# Patient Record
Sex: FEMALE | Race: WHITE | Employment: OTHER | URBAN - METROPOLITAN AREA
[De-identification: names, ages, dates, MRNs, and addresses within clinical notes are randomized per-mention and may not be internally consistent; named-entity substitution may affect disease eponyms.]

---

## 2017-05-24 ENCOUNTER — TELEPHONE (OUTPATIENT)
Dept: RESPIRATORY THERAPY | Age: 77
End: 2017-05-24

## 2017-06-22 ENCOUNTER — TELEPHONE (OUTPATIENT)
Dept: RESPIRATORY THERAPY | Age: 77
End: 2017-06-22

## 2017-07-18 ENCOUNTER — TELEPHONE (OUTPATIENT)
Dept: RESPIRATORY THERAPY | Age: 77
End: 2017-07-18

## 2017-08-04 ENCOUNTER — HOSPITAL ENCOUNTER (OUTPATIENT)
Dept: VASCULAR SURGERY | Age: 77
Discharge: HOME | End: 2017-08-04
Attending: FAMILY MEDICINE
Payer: MEDICARE

## 2017-08-04 DIAGNOSIS — R09.89 RIGHT CAROTID BRUIT: ICD-10-CM

## 2017-08-04 PROCEDURE — 93880 EXTRACRANIAL BILAT STUDY: CPT

## 2017-08-04 NOTE — PROCEDURES
[address]  *** FINAL REPORT ***    Name: Carlee Coles  MRN: [id number]    Outpatient  : 14 Oct 1940  HIS Order #: [id number]  [address] [facility] Visit #: [id number]  Date: 04 Aug 2017    TYPE OF TEST: Cerebrovascular Duplex    REASON FOR TEST  Carotid bruit (right hemisphere)    Right Carotid:-             Proximal               Mid                 Distal  cm/s  Systolic  Diastolic  Systolic  Diastolic  Systolic  Diastolic  CCA:     62.4      17.0                            65.0      18.0  Bulb:  ECA:     86.0  ICA:     69.0      19.0                            81.0      21.0  ICA/CCA:  1.1       1.1    ICA Stenosis:    Right Vertebral:-  Finding: Antegrade  Sys:       59.0  Jana:    Right Subclavian:    Left Carotid:-            Proximal                Mid                 Distal  cm/s  Systolic  Diastolic  Systolic  Diastolic  Systolic  Diastolic  CCA:     88.5      17.0                            73.0      17.0  Bulb:  ECA:    116.0  ICA:     95.0      13.0                            82.0      26.0  ICA/CCA:  1.3       0.8    ICA Stenosis:    Left Vertebral:-  Finding: Antegrade  Sys:       69.0  Jana:    Left Subclavian:    INTERPRETATION/FINDINGS  PROCEDURE:  Color duplex ultrasound imaging of extracranial  cerebrovascular arteries. FINDINGS:       Right:  Internal carotid velocity is within normal limits. There   is narrowing of the internal carotid flow channel on color Doppler  imaging and calcific plaque on B-mode imaging, consistent with less  than 50 percent stenosis. The common and external carotid arteries  are patent and without evidence of hemodynamically significant  stenosis. Left:   Internal carotid velocity is within normal limits. There   is narrowing of the internal carotid flow channel on color Doppler  imaging and calcific plaque on B-mode imaging, consistent with less  than 50 percent stenosis.   The common and external carotid arteries  are patent and without evidence of hemodynamically significant  stenosis. The internal carotid is tortuous distally and appears to be   looped. IMPRESSION:  Consistent with less than 50% stenosis of the internal  carotids. Vertebrals are patent with antegrade flow. ADDITIONAL COMMENTS    I have personally reviewed the data relevant to the interpretation of  this  study.     TECHNOLOGIST: Kelsi Patino RVT  Signed: 08/04/2017 02:12 PM    PHYSICIAN: Verner Ledger, MD  Signed: 08/07/2017 07:21 AM

## 2017-08-17 ENCOUNTER — TELEPHONE (OUTPATIENT)
Dept: RESPIRATORY THERAPY | Age: 77
End: 2017-08-17

## 2017-09-11 ENCOUNTER — TELEPHONE (OUTPATIENT)
Dept: RESPIRATORY THERAPY | Age: 77
End: 2017-09-11

## 2017-10-19 ENCOUNTER — TELEPHONE (OUTPATIENT)
Dept: RESPIRATORY THERAPY | Age: 77
End: 2017-10-19

## 2018-02-13 ENCOUNTER — DOCUMENTATION ONLY (OUTPATIENT)
Dept: SLEEP MEDICINE | Age: 78
End: 2018-02-13

## 2018-02-13 NOTE — PROGRESS NOTES
Patient called and having issues with PAP humidifier. Splashing water on her face. Directed her to call [address] YULIYA KITCHEN for assistance. Went ahead and scheduled her follow-up on 5/25/2018 11:20am with Dr. Gayla Decker.

## 2018-02-14 ENCOUNTER — TELEPHONE (OUTPATIENT)
Dept: SLEEP MEDICINE | Age: 78
End: 2018-02-14

## 2018-02-14 DIAGNOSIS — Z99.81 ON SUPPLEMENTAL OXYGEN THERAPY: ICD-10-CM

## 2018-02-14 DIAGNOSIS — J44.9 CHRONIC OBSTRUCTIVE PULMONARY DISEASE, UNSPECIFIED COPD TYPE (HCC): ICD-10-CM

## 2018-02-14 DIAGNOSIS — G47.33 OSA TREATED WITH BIPAP: Primary | ICD-10-CM

## 2018-02-14 NOTE — TELEPHONE ENCOUNTER
A new device has been prescribed - she will need a sleep to confirm settings given diagnosis of ESAU treated with Bi-Level therapy + COPD + O2 therapy. Encounter Diagnoses   Name Primary?  ESAU treated with BiPAP Yes    Chronic obstructive pulmonary disease, unspecified COPD type ([facility] [address]     On supplemental oxygen therapy      Orders Placed This Encounter    AMB SUPPLY ORDER     Diagnosis: Sleep Apnea ICD-10 Code (G47.30); ICD-9 Code (780.57). Positive Airway Pressure Therapy: Duration of need: 99 months. ResMed VPAP S (Spontaneous Mode):  IPAP: 12 cmH2O; Minimum EPAP: 08 cmH2O + 2 LPM supplemental oxygen therapy. Ramp Time: 30 Minutes; Easy Breathe: On.    CPAP mask -  As fitted during titration OR patient preference, headgear, tubing, and filter;  heated humidifier; wireless modem. Remote monitoring enrollment. Birdie Noriega MD, FAASM; NPI: [id number]  Electronically signed.  02/14/18    SLEEP LAB (PAP TITRATION)     Standing Status:   Future     Standing Expiration Date:   8/15/2018     Scheduling Instructions:      Perform Bi-Level titration on Supplemental Oxygen 2 LPM.     Order Specific Question:   Reason for Exam     Answer:   ESAU

## 2018-02-14 NOTE — TELEPHONE ENCOUNTER
Patient called stating she needs a new machine per DME company but she needs a new order. She already has an appointment scheduled in May for follow up but requests a order for new machine in the mean time. Please advise. Her current machine is \"spitting water everywhere\" and she is unable to use machine.

## 2018-02-22 ENCOUNTER — HOSPITAL ENCOUNTER (OUTPATIENT)
Dept: SLEEP MEDICINE | Age: 78
Discharge: HOME | End: 2018-02-22
Payer: MEDICARE

## 2018-02-22 VITALS
SYSTOLIC BLOOD PRESSURE: 136 MMHG | WEIGHT: 199.6 LBS | DIASTOLIC BLOOD PRESSURE: 83 MMHG | HEIGHT: 63 IN | BODY MASS INDEX: 35.37 KG/M2 | OXYGEN SATURATION: 94 % | HEART RATE: 103 BPM

## 2018-02-22 DIAGNOSIS — G47.33 OSA TREATED WITH BIPAP: ICD-10-CM

## 2018-02-22 DIAGNOSIS — Z99.81 ON SUPPLEMENTAL OXYGEN THERAPY: ICD-10-CM

## 2018-02-22 PROCEDURE — 95811 POLYSOM 6/>YRS CPAP 4/> PARM: CPT | Performed by: INTERNAL MEDICINE

## 2018-02-23 ENCOUNTER — TELEPHONE (OUTPATIENT)
Dept: SLEEP MEDICINE | Age: 78
End: 2018-02-23

## 2018-02-23 DIAGNOSIS — G47.33 OSA (OBSTRUCTIVE SLEEP APNEA): Primary | ICD-10-CM

## 2018-02-27 NOTE — TELEPHONE ENCOUNTER
Orders Placed This Encounter    AMB SUPPLY ORDER     Diagnosis: (G47.33) ESAU (obstructive sleep apnea)  (primary encounter diagnosis)     Positive Airway Pressure Therapy: Duration of need: 99 months. Respironics DreamStation ( Bi-Level Unit / Spontaneous Mode):    IPAP: 10 cmH2O; Minimum EPAP: 06 cmH2O. Ramp Time: 30 Minutes; Flex: 2. Remote monitoring enrollment.  Heated Humidifier     Oral/Nasal Combo Mask 1 every 3 months.  Oral Cushion Combo Mask (Replace) 2 per month.  Nasal Pillows Combo Mask (Replace) 2 per month.  Full Face Mask 1 every 3 months.  Full Face Mask Cushion 1 per month.  Nasal Cushion (Replace) 2 per month.  Nasal Pillows (Replace) 2 per month.  Nasal Interface Mask 1 every 3 months.  Headgear 1 every 6 months.  Chinstrap 1 every 6 months.  Tubing 1 every 3 months.  Filter(s) Disposable 2 per month.  Filter(s) Non-Disposable 1 every 6 months.  Oral Interface 1 every 3 months. [address] for [employer] (Replace) 1 every 6 months.  Tubing with heating element 1 every 3 months. Perform Mask Fitting per patient preference and comfort - replace as above. Ashvin Guerrier MD, FAASM; NPI: [id number]  Electronically signed.  02/27/18

## 2018-02-28 ENCOUNTER — DOCUMENTATION ONLY (OUTPATIENT)
Dept: SLEEP MEDICINE | Age: 78
End: 2018-02-28

## 2018-03-02 ENCOUNTER — OFFICE VISIT (OUTPATIENT)
Dept: SLEEP MEDICINE | Age: 78
End: 2018-03-02

## 2018-03-02 VITALS
HEIGHT: 63 IN | OXYGEN SATURATION: 96 % | HEART RATE: 89 BPM | BODY MASS INDEX: 35.26 KG/M2 | SYSTOLIC BLOOD PRESSURE: 157 MMHG | WEIGHT: 199 LBS | DIASTOLIC BLOOD PRESSURE: 85 MMHG

## 2018-03-02 DIAGNOSIS — Z86.59 H/O: DEPRESSION: ICD-10-CM

## 2018-03-02 DIAGNOSIS — J44.9 CHRONIC OBSTRUCTIVE PULMONARY DISEASE, UNSPECIFIED COPD TYPE (HCC): ICD-10-CM

## 2018-03-02 DIAGNOSIS — Z99.81 ON SUPPLEMENTAL OXYGEN THERAPY: ICD-10-CM

## 2018-03-02 DIAGNOSIS — I10 ESSENTIAL HYPERTENSION: ICD-10-CM

## 2018-03-02 DIAGNOSIS — G47.33 OSA (OBSTRUCTIVE SLEEP APNEA): Primary | ICD-10-CM

## 2018-03-02 NOTE — PATIENT INSTRUCTIONS
[address] [facility] [place]. [place], [address]  Tel.  [phone]  Fax. [address] [facility] [address]  [place], [address] [facility]  Tel.  [phone]  Fax. [phone] [address] Billy Chung  Tel.  [phone]  Fax. [phone]     Learning About CPAP for Sleep Apnea  What is CPAP? CPAP is a small machine that you use at home every night while you sleep. It increases air pressure in your throat to keep your airway open. When you have sleep apnea, this can help you sleep better so you feel much better. CPAP stands for \"continuous positive airway pressure. \"  The CPAP machine will have one of the following:  · A mask that covers your nose and mouth  · Prongs that fit into your nose  · A mask that covers your nose only, the most common type. This type is called NCPAP. The N stands for \"nasal.\"  Why is it done? CPAP is usually the best treatment for obstructive sleep apnea. It is the first treatment choice and the most widely used. Your doctor may suggest CPAP if you have:  · Moderate to severe sleep apnea. · Sleep apnea and coronary artery disease (CAD) or heart failure. How does it help? · CPAP can help you have more normal sleep, so you feel less sleepy and more alert during the daytime. · CPAP may help keep heart failure or other heart problems from getting worse. · NCPAP may help lower your blood pressure. · If you use CPAP, your bed partner may also sleep better because you are not snoring or restless. What are the side effects? Some people who use CPAP have:  · A dry or stuffy nose and a sore throat. · Irritated skin on the face. · Sore eyes. · Bloating. If you have any of these problems, work with your doctor to fix them. Here are some things you can try:  · Be sure the mask or nasal prongs fit well. · See if your doctor can adjust the pressure of your CPAP. · If your nose is dry, try a humidifier.   · If your nose is runny or stuffy, try decongestant medicine or a steroid nasal spray. If these things do not help, you might try a different type of machine. Some machines have air pressure that adjusts on its own. Others have air pressures that are different when you breathe in than when you breathe out. This may reduce discomfort caused by too much pressure in your nose. Where can you learn more? Go to "[employer]be  Enter Manuel Avelar in the search box to learn more about \"Learning About CPAP for Sleep Apnea. \"   © 3456-7725 Healthwise, Incorporated. Care instructions adapted under license by [facility] [employer] (which disclaims liability or warranty for this information). This care instruction is for use with your licensed healthcare professional. If you have questions about a medical condition or this instruction, always ask your healthcare professional. [address] any warranty or liability for your use of this information. Content Version: 4.8.37891; Last Revised: January 11, 2010  PROPER SLEEP HYGIENE    What to avoid  · Do not have drinks with caffeine, such as coffee or black tea, for 8 hours before bed. · Do not smoke or use other types of tobacco near bedtime. Nicotine is a stimulant and can keep you awake. · Avoid drinking alcohol late in the evening, because it can cause you to wake in the middle of the night. · Do not eat a big meal close to bedtime. If you are hungry, eat a light snack. · Do not drink a lot of water close to bedtime, because the need to urinate may wake you up during the night. · Do not read or watch TV in bed. Use the bed only for sleeping and sexual activity. What to try  · Go to bed at the same time every night, and wake up at the same time every morning. Do not take naps during the day. · Keep your bedroom quiet, dark, and cool. · Get regular exercise, but not within 3 to 4 hours of your bedtime. .  · Sleep on a comfortable pillow and mattress.   · If watching the clock makes you anxious, turn it facing away from you so you cannot see the time. · If you worry when you lie down, start a worry book. Well before bedtime, write down your worries, and then set the book and your concerns aside. · Try meditation or other relaxation techniques before you go to bed. · If you cannot fall asleep, get up and go to another room until you feel sleepy. Do something relaxing. Repeat your bedtime routine before you go to bed again. · Make your house quiet and calm about an hour before bedtime. Turn down the lights, turn off the TV, log off the computer, and turn down the volume on music. This can help you relax after a busy day. Drowsy Driving: The [employer] cites drowsiness as a causing factor in more than 941,279 police reported crashes annually, resulting in 76,000 injuries and 1,500 deaths. Other surveys suggest 55% of people polled have driven while drowsy in the past year, 23% had fallen asleep but not crashed, 3% crashed, and 2% had and accident due to drowsy driving. Who is at risk? Young Drivers: One study of drowsy driving accidents states that 55% of the drivers were under 25 years. Of those, 75% were male. Shift Workers and Travelers: People who work overnight or travel across time zones frequently are at higher risk of experiencing Circadian Rhythm Disorders. They are trying to work and function when their body is programed to sleep. Sleep Deprived: Lack of sleep has a serious impact on your ability to pay attention or focus on a task. Consistently getting less than the average of 8 hours your body needs creates partial or cumulative sleep deprivation. Untreated Sleep Disorders: Sleep Apnea, Narcolepsy, R.L.S., and other sleep disorders (untreated) prevent a person from getting enough restful sleep. This leads to excessive daytime sleepiness and increases the risk for drowsy driving accidents by up to 7 times.   Medications / Alcohol: Even over the counter medications can cause drowsiness. Medications that impair a drivers attention should have a warning label. Alcohol naturally makes you sleepy and on its own can cause accidents. Combined with excessive drowsiness its effects are amplified. Signs of Drowsy Driving:   * You don't remember driving the last few miles   * You may drift out of your boyd   * You are unable to focus and your thoughts wander   * You may yawn more often than normal   * You have difficulty keeping your eyes open / nodding off   * Missing traffic signs, speeding, or tailgating  Prevention-   Good sleep hygiene, lifestyle and behavioral choices have the most impact on drowsy driving. There is no substitute for sleep and the average person requires 8 hours nightly. If you find yourself driving drowsy, stop and sleep. Consider the sleep hygiene tips provided during your visit as well. Medication Refill Policy: Refills for all medications require 1 week advance notice. Please have your pharmacy fax a refill request. We are unable to fax, or call in \"controled substance\" medications and you will need to pick these prescriptions up from our office. [employer] Activation    Thank you for requesting access to [employer]. Please follow the instructions below to securely access and download your online medical record. [employer] allows you to send messages to your doctor, view your test results, renew your prescriptions, schedule appointments, and more. How Do I Sign Up? 1. In your internet browser, go to [email or web address]. 2. Click on the First Time User? Click Here link in the Sign In box. You will see the New Member Sign Up page. 3. Enter your [employer] Access Code exactly as it appears below. You will not need to use this code after youve completed the sign-up process. If you do not sign up before the expiration date, you must request a new code.     K[employer] Access Code: [email or web address]  Expires: 2018  3:58 PM (This is the date your [employer] access code will )    4. Enter the last four digits of your Social Security Number (xxxx) and Date of Birth (mm/dd/yyyy) as indicated and click Submit. You will be taken to the next sign-up page. 5. Create a [employer] ID. This will be your [employer] login ID and cannot be changed, so think of one that is secure and easy to remember. 6. Create a [employer] password. You can change your password at any time. 7. Enter your Password Reset Question and Answer. This can be used at a later time if you forget your password. 8. Enter your e-mail address. You will receive e-mail notification when new information is available in [address] 9. Click Sign Up. You can now view and download portions of your medical record. 10. Click the Download Summary menu link to download a portable copy of your medical information. Additional Information    If you have questions, please call [phone]. Remember, [employer] is NOT to be used for urgent needs. For medical emergencies, dial 911.

## 2018-03-02 NOTE — PROGRESS NOTES
Initial Apnea/Hypopnea index was 24. She elected to proceed with a positive airway pressure trial (CPAP) and a titration study was ordered and reviewed with the patient. Most of the respiratory events were resolved on Bi - Level 10/06 cmH2O. She reports of sleeping difficulties without PAP therapy, she is currently on Supplemental Oxygen 3 LPM. She reports staying awake in bed viewing television or talking on the phone at night and then cat naps during the day. Visit Vitals    /85 (BP 1 Location: Right arm, BP Patient Position: Sitting)    Pulse 89    Ht 5' 3\" (1.6 m)    Wt 199 lb (90.3 kg)    SpO2 96%    BMI 35.25 kg/m2           General:   Alert, oriented, not in distress   Neck:   No JVD    Chest/Lungs:  Equal lung expansion , clear on auscultation    CVS:  Normal rate, regular rhythm ;    Extremities:  no obvious rashes , negative edema    Neuro:  No focal deficits ; No obvious tremor    Psych:  Normal affect ,  Normal countenance ;       Encounter Diagnoses   Name Primary?  ESAU (obstructive sleep apnea) Yes    Chronic obstructive pulmonary disease, unspecified COPD type ([address]     On supplemental oxygen therapy     Essential hypertension     H/O: depression      P>    * Counseling was provided regarding proper sleep hygiene (including effect of light on sleep), paradoxical intention and stimulus control. * We have recommended a dedicated weight loss through appropriate diet and an exercise regiment as significant weight reduction has been shown to reduce severity of obstructive sleep apnea. * Follow-up Disposition:  Return in about 3 months (around 6/2/2018), or if symptoms worsen or fail to improve. * She was asked to contact our office for any problems regarding PAP therapy. * Counseling was provided regarding the importance of regular PAP use and on proper sleep hygiene and safe driving. * Re-enforced proper and regular cleaning for the device.     Thank you for allowing us to participate in your patient's medical care. Ni Jones MD, FAASM  Electronically signed.  03/02/18

## 2018-05-21 ENCOUNTER — OFFICE VISIT (OUTPATIENT)
Dept: SLEEP MEDICINE | Age: 78
End: 2018-05-21

## 2018-05-21 VITALS
SYSTOLIC BLOOD PRESSURE: 145 MMHG | WEIGHT: 203 LBS | DIASTOLIC BLOOD PRESSURE: 74 MMHG | OXYGEN SATURATION: 97 % | BODY MASS INDEX: 35.97 KG/M2 | HEART RATE: 92 BPM | HEIGHT: 63 IN

## 2018-05-21 DIAGNOSIS — I10 ESSENTIAL HYPERTENSION: ICD-10-CM

## 2018-05-21 DIAGNOSIS — Z86.59 H/O: DEPRESSION: ICD-10-CM

## 2018-05-21 DIAGNOSIS — J44.1 COPD EXACERBATION (HCC): ICD-10-CM

## 2018-05-21 DIAGNOSIS — Z99.81 ON SUPPLEMENTAL OXYGEN THERAPY: ICD-10-CM

## 2018-05-21 DIAGNOSIS — G47.33 OSA (OBSTRUCTIVE SLEEP APNEA): Primary | ICD-10-CM

## 2018-05-21 RX ORDER — INSULIN GLARGINE 100 [IU]/ML
INJECTION, SOLUTION SUBCUTANEOUS
Refills: 3 | Status: ON HOLD | COMMUNITY
Start: 2018-04-14 | End: 2019-11-25 | Stop reason: SDUPTHER

## 2018-05-21 RX ORDER — EZETIMIBE 10 MG/1
10 TABLET ORAL
COMMUNITY
Start: 2018-04-25

## 2018-05-21 RX ORDER — GLIMEPIRIDE 2 MG/1
2 TABLET ORAL 2 TIMES DAILY
Refills: 5 | COMMUNITY
Start: 2018-04-14 | End: 2021-04-26

## 2018-05-21 RX ORDER — BLOOD SUGAR DIAGNOSTIC
STRIP MISCELLANEOUS
Refills: 5 | COMMUNITY
Start: 2018-03-24

## 2018-05-21 NOTE — PATIENT INSTRUCTIONS
[address] [place]. [place], [address]  Tel.  [phone]  Fax. [address] [facility] [address]  [place], [address] [facility]  Tel.  [phone]  Fax. [phone] [address]  Tel.  [phone]  Fax. [phone]     Learning About CPAP for Sleep Apnea  What is CPAP? CPAP is a small machine that you use at home every night while you sleep. It increases air pressure in your throat to keep your airway open. When you have sleep apnea, this can help you sleep better so you feel much better. CPAP stands for \"continuous positive airway pressure. \"  The CPAP machine will have one of the following:  · A mask that covers your nose and mouth  · Prongs that fit into your nose  · A mask that covers your nose only, the most common type. This type is called NCPAP. The N stands for \"nasal.\"  Why is it done? CPAP is usually the best treatment for obstructive sleep apnea. It is the first treatment choice and the most widely used. Your doctor may suggest CPAP if you have:  · Moderate to severe sleep apnea. · Sleep apnea and coronary artery disease (CAD) or heart failure. How does it help? · CPAP can help you have more normal sleep, so you feel less sleepy and more alert during the daytime. · CPAP may help keep heart failure or other heart problems from getting worse. · NCPAP may help lower your blood pressure. · If you use CPAP, your bed partner may also sleep better because you are not snoring or restless. What are the side effects? Some people who use CPAP have:  · A dry or stuffy nose and a sore throat. · Irritated skin on the face. · Sore eyes. · Bloating. If you have any of these problems, work with your doctor to fix them. Here are some things you can try:  · Be sure the mask or nasal prongs fit well. · See if your doctor can adjust the pressure of your CPAP. · If your nose is dry, try a humidifier.   · If your nose is runny or stuffy, try Problem: Syncope (Adult)  Goal: Identify Related Risk Factors and Signs and Symptoms  Related risk factors and signs and symptoms are identified upon initiation of Human Response Clinical Practice Guideline (CPG).  Outcome: No Change    09/29/17 1846   Syncope   Related Risk Factors (Syncope) hypotension   Signs and Symptoms (Syncope) dizziness      A&O to self, disoriented to place and time. Pt is here with orthostatic hypotension, vitals reflect that, MDs aware. Bed alarm is on. Pt is an assist of 2 or chair/bedfast. Pt was able to stand briefly today for vitals, not yet ambulating. Pt incontinent of urine x2. Pt on regular diet, ate well with assistance.       decongestant medicine or a steroid nasal spray. If these things do not help, you might try a different type of machine. Some machines have air pressure that adjusts on its own. Others have air pressures that are different when you breathe in than when you breathe out. This may reduce discomfort caused by too much pressure in your nose. Where can you learn more? Go to [employer]  Enter Sonya Bowling in the search box to learn more about \"Learning About CPAP for Sleep Apnea. \"   © 0338-5264 Healthwise, [employer]. Care instructions adapted under license by Marlene White (which disclaims liability or warranty for this information). This care instruction is for use with your licensed healthcare professional. If you have questions about a medical condition or this instruction, always ask your healthcare professional. [address] any warranty or liability for your use of this information. Content Version: 8.7.73507; Last Revised: January 11, 2010  PROPER SLEEP HYGIENE    What to avoid  · Do not have drinks with caffeine, such as coffee or black tea, for 8 hours before bed. · Do not smoke or use other types of tobacco near bedtime. Nicotine is a stimulant and can keep you awake. · Avoid drinking alcohol late in the evening, because it can cause you to wake in the middle of the night. · Do not eat a big meal close to bedtime. If you are hungry, eat a light snack. · Do not drink a lot of water close to bedtime, because the need to urinate may wake you up during the night. · Do not read or watch TV in bed. Use the bed only for sleeping and sexual activity. What to try  · Go to bed at the same time every night, and wake up at the same time every morning. Do not take naps during the day. · Keep your bedroom quiet, dark, and cool. · Get regular exercise, but not within 3 to 4 hours of your bedtime. .  · Sleep on a comfortable pillow and mattress.   · If watching the clock makes you anxious, turn it facing away from you so you cannot see the time. · If you worry when you lie down, start a worry book. Well before bedtime, write down your worries, and then set the book and your concerns aside. · Try meditation or other relaxation techniques before you go to bed. · If you cannot fall asleep, get up and go to another room until you feel sleepy. Do something relaxing. Repeat your bedtime routine before you go to bed again. · Make your house quiet and calm about an hour before bedtime. Turn down the lights, turn off the TV, log off the computer, and turn down the volume on music. This can help you relax after a busy day. Drowsy Driving: The [employer] cites drowsiness as a causing factor in more than 715,507 police reported crashes annually, resulting in 76,000 injuries and 1,500 deaths. Other surveys suggest 55% of people polled have driven while drowsy in the past year, 23% had fallen asleep but not crashed, 3% crashed, and 2% had and accident due to drowsy driving. Who is at risk? Young Drivers: One study of drowsy driving accidents states that 55% of the drivers were under 25 years. Of those, 75% were male. Shift Workers and Travelers: People who work overnight or travel across time zones frequently are at higher risk of experiencing Circadian Rhythm Disorders. They are trying to work and function when their body is programed to sleep. Sleep Deprived: Lack of sleep has a serious impact on your ability to pay attention or focus on a task. Consistently getting less than the average of 8 hours your body needs creates partial or cumulative sleep deprivation. Untreated Sleep Disorders: Sleep Apnea, Narcolepsy, R.L.S., and other sleep disorders (untreated) prevent a person from getting enough restful sleep. This leads to excessive daytime sleepiness and increases the risk for drowsy driving accidents by up to 7 times.   Medications / Alcohol: Even over the counter medications can cause drowsiness. Medications that impair a drivers attention should have a warning label. Alcohol naturally makes you sleepy and on its own can cause accidents. Combined with excessive drowsiness its effects are amplified. Signs of Drowsy Driving:   * You don't remember driving the last few miles   * You may drift out of your boyd   * You are unable to focus and your thoughts wander   * You may yawn more often than normal   * You have difficulty keeping your eyes open / nodding off   * Missing traffic signs, speeding, or tailgating  Prevention-   Good sleep hygiene, lifestyle and behavioral choices have the most impact on drowsy driving. There is no substitute for sleep and the average person requires 8 hours nightly. If you find yourself driving drowsy, stop and sleep. Consider the sleep hygiene tips provided during your visit as well. Medication Refill Policy: Refills for all medications require 1 week advance notice. Please have your pharmacy fax a refill request. We are unable to fax, or call in \"controled substance\" medications and you will need to pick these prescriptions up from our office. [employer] Activation    Thank you for requesting access to [employer]. Please follow the instructions below to securely access and download your online medical record. [employer] allows you to send messages to your doctor, view your test results, renew your prescriptions, schedule appointments, and more. How Do I Sign Up? 1. In your internet browser, go to [email or web address] "[email or web address]. 2. Click on the First Time User? Click Here link in the Sign In box. You will see the New Member Sign Up page. 3. Enter your [employer] Access Code exactly as it appears below. You will not need to use this code after youve completed the sign-up process. If you do not sign up before the expiration date, you must request a new code.     [employer] Access Code: [email or web address]  Expires: 2018  4:58 PM (This is the date your [employer] access code will )    4. Enter the last four digits of your Social Security Number (xxxx) and Date of Birth (mm/dd/yyyy) as indicated and click Submit. You will be taken to the next sign-up page. 5. Create a [employer] ID. This will be your [employer] login ID and cannot be changed, so think of one that is secure and easy to remember. 6. Create a [employer] password. You can change your password at any time. 7. Enter your Password Reset Question and Answer. This can be used at a later time if you forget your password. 8. Enter your e-mail address. You will receive e-mail notification when new information is available in [address] 9. Click Sign Up. You can now view and download portions of your medical record. 10. Click the Download Summary menu link to download a portable copy of your medical information. Additional Information    If you have questions, please call [phone]. Remember, [employer] is NOT to be used for urgent needs. For medical emergencies, dial 911.

## 2018-05-21 NOTE — MR AVS SNAPSHOT
[address] [place] [facility] 99 [id number] [phone] Patient: Jim Walsh MRN: [id number] Visit Information Date & Time Provider Department Dept. Phone Encounter #  
 2018  2:40 PM Austin Braga MD [facility] 53 [id number] Follow-up Instructions Return in about 1 year (around 2019), or if symptoms worsen or fail to improve. Follow-up and Disposition History Upcoming Health Maintenance Date Due  
 EYE EXAM RETINAL OR DILATED Q1 10/14/1952 DTaP/Tdap/Td series (1 - Tdap) 10/14/1963 ZOSTER VACCINE AGE 60> 2002 GLAUCOMA SCREENING Q2Y 10/14/2007 FOOT EXAM Q1 2014 MICROALBUMIN Q1 2016 LIPID PANEL Q1 2016 HEMOGLOBIN A1C Q6M 2017 Pneumococcal 65+ Low/Medium Risk (2 of 2 - PPSV23) 7/10/2017 MEDICARE YEARLY EXAM 3/14/2018 Influenza Age 5 to Adult 2018 Allergies as of 2018  Review Complete On: 2018 By: Austin Braga MD  
  
 Severity Noted Reaction Type Reactions Codeine High 2010   Intolerance Shortness of Breath Crestor [Rosuvastatin]  2012    Other (comments) Leg pains and could not walk Lipitor [Atorvastatin]  10/10/2012    Myalgia Tetanus Vaccines And Toxoid  2012    Swelling Current Immunizations  Reviewed on 2014 Name Date Influenza Vaccine PF  Deferred (Patient Refused) ZZZ-RETIRED (DO NOT USE) Pneumococcal Vaccine (Unspecified Type) 7/10/2012 Not reviewed this visit You Were Diagnosed With   
  
 Codes Comments ESAU (obstructive sleep apnea)    -  Primary ICD-10-CM: G47.33 
ICD-9-CM: 327.23 On supplemental oxygen therapy     ICD-10-CM: Z99.81 ICD-9-CM: V46.2 COPD exacerbation ([facility] [address]     ICD-10-CM: J44.1 ICD-9-CM: 491.21 Essential hypertension     ICD-10-CM: I10 
ICD-9-CM: 401.9 BMI 35.0-35.9,adult     ICD-10-CM: T01.16 ICD-9-CM: V85.35   
 H/O: depression     ICD-10-CM: Z86.59 
ICD-9-CM: V11.8 Vitals BP Pulse Height(growth percentile) Weight(growth percentile) SpO2 BMI  
 145/74 92 5' 3\" (1.6 m) 203 lb (92.1 kg) 97% 35.96 kg/m2 OB Status Smoking Status Hysterectomy Former Smoker Vitals History BMI and BSA Data Body Mass Index Body Surface Area 35.96 kg/m 2 2.02 m 2 Preferred Pharmacy Pharmacy Name Phone [id number] [place], [address] [facility] [phone] Your Updated Medication List  
  
   
This list is accurate as of 5/21/18  3:34 PM.  Always use your most recent med list.  
  
  
  
  
 ACCU-CHEK SHELIA PLUS TEST STRP strip Generic drug:  glucose blood VI test strips TEST BLOOD SUAGR TWICE A DAY  
  
 aspirin 81 mg chewable tablet Take 1 Tab by mouth daily. carvedilol 6.25 mg tablet Commonly known as:  [employer] Take 1 Tab by mouth two (2) times daily (with meals). CLARITIN 10 mg tablet Generic drug:  loratadine Take 10 mg by mouth daily as needed for Allergies. CO Q-10 50 mg capsule Generic drug:  co-enzyme Q-10 Take 50 mg by mouth daily. DULoxetine 60 mg capsule Commonly known as:  CYMBALTA Take 1 capsule by mouth  every day  
  
 ezetimibe 10 mg tablet Commonly known as:  ZETIA  
  
 fenofibrate nanocrystallized 145 mg tablet Commonly known as:  [employer] Take 1 tablet by mouth  daily FISH OIL 1,000 mg Cap Generic drug:  omega-3 fatty acids-vitamin e Take 1 Cap by mouth daily. cholesterol  
  
 fluticasone-salmeterol 100-50 mcg/dose diskus inhaler Commonly known as:  ADVAIR DISKUS Take 1 Puff by inhalation every twelve (12) hours. * glimepiride 2 mg tablet Commonly known as:  AMARYL Take 1 Tab by mouth two (2) times a day for 15 days. * glimepiride 2 mg tablet Commonly known as:  AMARYL TAKE 3 TABLETS BY MOUTH DAILY AS DIRECTED  
  
 LANTUS SOLOSTAR U-100 INSULIN 100 unit/mL (3 mL) Inpn Generic drug:  insulin glargine INJECT 30 UNITS DAILY  
  
 lisinopril 10 mg tablet Commonly known as:  Jamaal Human Take 0.5 Tabs by mouth daily. NASONEX 50 mcg/actuation nasal spray Generic drug:  mometasone 2 Sprays by Both Nostrils route daily. omeprazole 20 mg capsule Commonly known as:  PRILOSEC Take 1 capsule by mouth  daily  
  
 pravastatin 80 mg tablet Commonly known as:  PRAVACHOL Take 1 tablet by mouth  nightly * PROVENTIL HFA 90 mcg/actuation inhaler Generic drug:  albuterol Take 2 Puffs by inhalation every four (4) hours as needed. Indications: BRONCHOSPASM PREVENTION  
  
 * albuterol 2.5 mg /3 mL (0.083 %) nebulizer solution Commonly known as:  PROVENTIL VENTOLIN  
3 mL by Nebulization route every four (4) hours as needed for Wheezing. THERA tablet Generic drug:  therapeutic multivitamin Take 1 Tab by mouth daily. tiotropium 18 mcg inhalation capsule Commonly known as:  Jasson Para Take 1 Cap by inhalation daily. * Notice: This list has 4 medication(s) that are the same as other medications prescribed for you. Read the directions carefully, and ask your doctor or other care provider to review them with you. Follow-up Instructions Return in about 1 year (around 5/21/2019), or if symptoms worsen or fail to improve. To-Do List   
 05/21/2018 Sleep Center:  SLEEP LAB (PAP TITRATION) Patient Instructions [address] [place]. [address] [facility], [address] Tel.  [phone] Fax. 100 [facility] [address] [place], [address] [facility] Tel.  [phone] Fax. [phone] [address] [address] Tel.  [phone] Fax. [phone] Learning About CPAP for Sleep Apnea What is CPAP? CPAP is a small machine that you use at home every night while you sleep. It increases air pressure in your throat to keep your airway open. When you have sleep apnea, this can help you sleep better so you feel much better. CPAP stands for \"continuous positive airway pressure. \" The CPAP machine will have one of the following: · A mask that covers your nose and mouth · Prongs that fit into your nose · A mask that covers your nose only, the most common type. This type is called NCPAP. The N stands for \"nasal.\" Why is it done? CPAP is usually the best treatment for obstructive sleep apnea. It is the first treatment choice and the most widely used. Your doctor may suggest CPAP if you have: · Moderate to severe sleep apnea. · Sleep apnea and coronary artery disease (CAD) or heart failure. How does it help? · CPAP can help you have more normal sleep, so you feel less sleepy and more alert during the daytime. · CPAP may help keep heart failure or other heart problems from getting worse. · NCPAP may help lower your blood pressure. · If you use CPAP, your bed partner may also sleep better because you are not snoring or restless. What are the side effects? Some people who use CPAP have: · A dry or stuffy nose and a sore throat. · Irritated skin on the face. · Sore eyes. · Bloating. If you have any of these problems, work with your doctor to fix them. Here are some things you can try: · Be sure the mask or nasal prongs fit well. · See if your doctor can adjust the pressure of your CPAP. · If your nose is dry, try a humidifier. · If your nose is runny or stuffy, try decongestant medicine or a steroid nasal spray. If these things do not help, you might try a different type of machine. Some machines have air pressure that adjusts on its own.  Others have air pressures that are different when you breathe in than when you breathe out. This may reduce discomfort caused by too much pressure in your nose. Where can you learn more? Go to [employer]ff[employer] Enter M872 in the search box to learn more about \"Learning About CPAP for Sleep Apnea. \"  
© 3108-6698 Healthwise, Incorporated. Care instructions adapted under license by [facility] (which disclaims liability or warranty for this information). This care instruction is for use with your licensed healthcare professional. If you have questions about a medical condition or this instruction, always ask your healthcare professional. [address] any warranty or liability for your use of this information. Content Version: 1.4.95185; Last Revised: January 11, 2010 PROPER SLEEP HYGIENE What to avoid · Do not have drinks with caffeine, such as coffee or black tea, for 8 hours before bed. · Do not smoke or use other types of tobacco near bedtime. Nicotine is a stimulant and can keep you awake. · Avoid drinking alcohol late in the evening, because it can cause you to wake in the middle of the night. · Do not eat a big meal close to bedtime. If you are hungry, eat a light snack. · Do not drink a lot of water close to bedtime, because the need to urinate may wake you up during the night. · Do not read or watch TV in bed. Use the bed only for sleeping and sexual activity. What to try · Go to bed at the same time every night, and wake up at the same time every morning. Do not take naps during the day. · Keep your bedroom quiet, dark, and cool. · Get regular exercise, but not within 3 to 4 hours of your bedtime. James Pulido · Sleep on a comfortable pillow and mattress. · If watching the clock makes you anxious, turn it facing away from you so you cannot see the time. · If you worry when you lie down, start a worry book. Well before bedtime, write down your worries, and then set the book and your concerns aside. · Try meditation or other relaxation techniques before you go to bed. · If you cannot fall asleep, get up and go to another room until you feel sleepy. Do something relaxing. Repeat your bedtime routine before you go to bed again. · Make your house quiet and calm about an hour before bedtime. Turn down the lights, turn off the TV, log off the computer, and turn down the volume on music. This can help you relax after a busy day. Drowsy Driving: The [employer] cites drowsiness as a causing factor in more than 876,321 police reported crashes annually, resulting in 76,000 injuries and 1,500 deaths. Other surveys suggest 55% of people polled have driven while drowsy in the past year, 23% had fallen asleep but not crashed, 3% crashed, and 2% had and accident due to drowsy driving. Who is at risk? Young Drivers: One study of drowsy driving accidents states that 55% of the drivers were under 25 years. Of those, 75% were male. Shift Workers and Travelers: People who work overnight or travel across time zones frequently are at higher risk of experiencing Circadian Rhythm Disorders. They are trying to work and function when their body is programed to sleep. Sleep Deprived: Lack of sleep has a serious impact on your ability to pay attention or focus on a task. Consistently getting less than the average of 8 hours your body needs creates partial or cumulative sleep deprivation. Untreated Sleep Disorders: Sleep Apnea, Narcolepsy, R.L.S., and other sleep disorders (untreated) prevent a person from getting enough restful sleep. This leads to excessive daytime sleepiness and increases the risk for drowsy driving accidents by up to 7 times. Medications / Alcohol: Even over the counter medications can cause drowsiness. Medications that impair a drivers attention should have a warning label.  Alcohol naturally makes you sleepy and on its own can cause accidents. Combined with excessive drowsiness its effects are amplified. Signs of Drowsy Driving: * You don't remember driving the last few miles * You may drift out of your boyd * You are unable to focus and your thoughts wander * You may yawn more often than normal 
 * You have difficulty keeping your eyes open / nodding off * Missing traffic signs, speeding, or tailgating Prevention-  
Good sleep hygiene, lifestyle and behavioral choices have the most impact on drowsy driving. There is no substitute for sleep and the average person requires 8 hours nightly. If you find yourself driving drowsy, stop and sleep. Consider the sleep hygiene tips provided during your visit as well. Medication Refill Policy: Refills for all medications require 1 week advance notice. Please have your pharmacy fax a refill request. We are unable to fax, or call in \"controled substance\" medications and you will need to pick these prescriptions up from our office. OBOOK Activation Thank you for requesting access to OBOOK. Please follow the instructions below to securely access and download your online medical record. OBOOK allows you to send messages to your doctor, view your test results, renew your prescriptions, schedule appointments, and more. How Do I Sign Up? 1. In your internet browser, go to [email or web address]. 2. Click on the First Time User? Click Here link in the Sign In box. You will see the New Member Sign Up page. 3. Enter your OBOOK Access Code exactly as it appears below. You will not need to use this code after youve completed the sign-up process. If you do not sign up before the expiration date, you must request a new code. OBOOK Access Code: [email or web address] Expires: 2018  4:58 PM (This is the date your OBOOK access code will ) 4.  Enter the last four digits of your Social Security Number (xxxx) and Date of Birth (mm/dd/yyyy) as indicated and click Submit. You will be taken to the next sign-up page. 5. Create a MyChart ID. This will be your [employer] login ID and cannot be changed, so think of one that is secure and easy to remember. 6. Create a [employer] password. You can change your password at any time. 7. Enter your Password Reset Question and Answer. This can be used at a later time if you forget your password. 8. Enter your e-mail address. You will receive e-mail notification when new information is available in [address] 9. Click Sign Up. You can now view and download portions of your medical record. 10. Click the Download Summary menu link to download a portable copy of your medical information. Additional Information If you have questions, please call [phone]. Remember, [employer] [facility] [employer] is NOT to be used for urgent needs. For medical emergencies, dial 911. Introducing [facility]! Magdy Bowens introduces [employer] patient portal. Now you can access parts of your medical record, email your doctor's office, and request medication refills online. 1. In your internet browser, go to [email or web address] 2. Click on the First Time User? Click Here link in the Sign In box. You will see the New Member Sign Up page. 3. Enter your [employer] Access Code exactly as it appears below. You will not need to use this code after youve completed the sign-up process. If you do not sign up before the expiration date, you must request a new code. · [employer] [facility] [employer] Access Code: [email or web address] Expires: 5/21/2018  4:58 PM 
 
4. Enter the last four digits of your Social Security Number (xxxx) and Date of Birth (mm/dd/yyyy) as indicated and click Submit. You will be taken to the next sign-up page. 5. Create a MyChart ID. This will be your [employer] login ID and cannot be changed, so think of one that is secure and easy to remember. 6. Create a InfoReach password. You can change your password at any time. 7. Enter your Password Reset Question and Answer. This can be used at a later time if you forget your password. 8. Enter your e-mail address. You will receive e-mail notification when new information is available in [address] 9. Click Sign Up. You can now view and download portions of your medical record. 10. Click the Download Summary menu link to download a portable copy of your medical information. If you have questions, please visit the Frequently Asked Questions section of the Info[employer] website. Remember, [employer] is NOT to be used for urgent needs. For medical emergencies, dial 911. Now available from your iPhone and Android! Please provide this summary of care documentation to your next provider. Your primary care clinician is listed as Sharda Guzman. If you have any questions after today's visit, please call [phone].

## 2018-05-21 NOTE — PROGRESS NOTES
[address] [facility]. [place], [address]  Tel.  [phone]  Fax. 100 [facility] 60  [place], [address] [facility]  Tel.  [phone]  Fax. [phone] [address] [place]   Tel.  [phone]  Fax. [phone]     S>Joann Greenwood is a 78 y.o. female seen for a positive airway pressure follow-up. She reports no problems using the device. She is 90% compliant over the past 30 days. The following problems are identified:    Drowsiness no Problems exhaling no   Snoring no Forget to put on no   Mask Comfortable yes Can't fall asleep no   Dry Mouth no Mask falls off no   Air Leaking no Frequent awakenings no         She admits that her sleep has improved. Allergies   Allergen Reactions    Codeine Shortness of Breath    Crestor [Rosuvastatin] Other (comments)     Leg pains and could not walk    Lipitor [Atorvastatin] Myalgia    Tetanus Vaccines And Toxoid Swelling       She has a current medication list which includes the following prescription(s): ezetimibe, lantus solostar u-100 insulin, glimepiride, accu-chek jacobo plus test strp, lisinopril, omeprazole, fenofibrate nanocrystallized, pravastatin, duloxetine, mometasone, therapeutic multivitamin, albuterol, aspirin, carvedilol, loratadine, albuterol, omega-3 fatty acids-vitamin e, glimepiride, co-enzyme q-10, fluticasone-salmeterol, and tiotropium. .      She  has a past medical history of Anemia NEC; Arthritis; Asthma; CAD (coronary artery disease); Calculus of kidney (7-); Cancer ([facility] [address] Chronic pain; COPD; Depression; Diabetes ([facility] [facility] GERD (gastroesophageal reflux disease); mammogram (11/29/2017); Hypertension; Joint pain; Morbid obesity ([facility] [address] [facility] Psychiatric disorder; and Sleep disorder. She also has no past medical history of [facility] Comfort Sleepiness Score: 6   and Modified F.O.S.Q. Score Total / 2: 17.5   which reflect improved sleep quality over therapy time.     O> Visit Vitals    /74    Pulse 92    Ht 5' 3\" (1.6 m)    Wt 203 lb (92.1 kg)    SpO2 97%    BMI 35.96 kg/m2         General:   Not in acute distress   Eyes:  Anicteric sclerae, no obvious strabismus   Nose:  No obvious nasal septum deviation    Oropharynx:   Class 4 oropharyngeal outlet, thick tongue base, uvula not seen due to low-lying soft palate, narrow tonsilo-pharyngeal pilars   Tonsils:   tonsils are not visualized due to low-lying soft palate   Neck:   midline trachea   Chest/Lungs:  Equal lung expansion, clear on auscultation    CVS:  Normal rate, regular rhythm; no JVD   Skin:  Warm to touch; no obvious rashes   Neuro:  No focal deficits ; no obvious tremor    Psych:  Normal affect,  normal countenance;           A>    ICD-10-CM ICD-9-CM    1. ESAU (obstructive sleep apnea) G47.33 327.23 SLEEP LAB (PAP TITRATION)   2. On supplemental oxygen therapy Z99.81 V46.2    3. COPD exacerbation ([address] [facility] J44.1 491.21    4. Essential hypertension I10 401.9    5. BMI 35.0-35.9,adult Z68.35 V85.35    6. H/O: depression Z86.59 V11.8      AHI = 24 (2013). On Bi - Level :  10/06 cmH2O + 3 LPM Supplemental Oxygen. Compliant:      yes    Therapeutic Response:  Positive    P>    Orders Placed This Encounter    SLEEP LAB (PAP TITRATION)     Standing Status:   Future     Standing Expiration Date:   11/19/2018     Scheduling Instructions:      Perform Bi-level Titration with supplemental oxygen 2 LPM     Order Specific Question:   Reason for Exam     Answer:   ESAU     * Previous studies reviewed with patient - she has been adequately titration at a much higher levels during REM Sleep on Bi-Level setting in 2014.     * Repeat testing recommended due to current low settings and and marked reduction in sleep efficiency on most recent test.    * We have recommended a dedicated weight loss through appropriate diet and an exercise regiment as significant weight reduction has been shown to reduce severity of obstructive sleep apnea. * Follow-up Disposition:  Return in about 1 year (around 5/21/2019), or if symptoms worsen or fail to improve. * She was asked to contact our office for any problems regarding PAP therapy. * Counseling was provided regarding the importance of regular PAP use and on proper sleep hygiene and safe driving. * Re-enforced proper and regular cleaning for the device. Thank you for allowing us to participate in your patient's medical care. Office visit exceeded 25 minutes with counseling and direction of care taking up more than 50% of the allotted time. Abimael Mckinley MD, FAASM  Electronically signed.  05/21/18

## 2018-06-08 ENCOUNTER — HOSPITAL ENCOUNTER (OUTPATIENT)
Dept: SLEEP MEDICINE | Age: 78
Discharge: HOME | End: 2018-06-08
Payer: MEDICARE

## 2018-06-08 DIAGNOSIS — G47.33 OSA (OBSTRUCTIVE SLEEP APNEA): ICD-10-CM

## 2018-06-08 PROCEDURE — 95811 POLYSOM 6/>YRS CPAP 4/> PARM: CPT | Performed by: INTERNAL MEDICINE

## 2018-06-09 VITALS
SYSTOLIC BLOOD PRESSURE: 157 MMHG | HEIGHT: 64 IN | DIASTOLIC BLOOD PRESSURE: 85 MMHG | HEART RATE: 101 BPM | TEMPERATURE: 99.1 F | BODY MASS INDEX: 34.56 KG/M2 | OXYGEN SATURATION: 92 % | WEIGHT: 202.4 LBS

## 2018-06-11 ENCOUNTER — TELEPHONE (OUTPATIENT)
Dept: SLEEP MEDICINE | Age: 78
End: 2018-06-11

## 2018-06-11 DIAGNOSIS — G47.33 OSA (OBSTRUCTIVE SLEEP APNEA): Primary | ICD-10-CM

## 2018-06-14 ENCOUNTER — DOCUMENTATION ONLY (OUTPATIENT)
Dept: SLEEP MEDICINE | Age: 78
End: 2018-06-14

## 2018-06-18 NOTE — TELEPHONE ENCOUNTER
Orders Placed This Encounter    AMB SUPPLY ORDER     Diagnosis: Sleep Apnea ICD-10 Code (G47.30); ICD-9 Code (780.57). Positive Airway Pressure Therapy: Duration of need: 99 months. ResMed VPAP Auto (VAuto Mode): Maximum IPAP: 22 cmH2O; Minimum EPAP: 5 cmH2O; PS: 4 cmH2O. Ramp Time: 30 Minutes. CPAP mask -  As fitted during titration OR patient preference, headgear, tubing, and filter;  heated humidifier; wireless modem. Remote monitoring enrollment. Austin Braga MD, FAASM; NPI: [id number]  Electronically signed. 06/18/18

## 2018-06-19 ENCOUNTER — TELEPHONE (OUTPATIENT)
Dept: SLEEP MEDICINE | Age: 78
End: 2018-06-19

## 2018-09-28 ENCOUNTER — OFFICE VISIT (OUTPATIENT)
Dept: URGENT CARE | Age: 78
End: 2018-09-28

## 2018-09-28 VITALS
TEMPERATURE: 97.9 F | HEART RATE: 92 BPM | HEIGHT: 64 IN | RESPIRATION RATE: 16 BRPM | OXYGEN SATURATION: 95 % | WEIGHT: 206 LBS | DIASTOLIC BLOOD PRESSURE: 64 MMHG | SYSTOLIC BLOOD PRESSURE: 147 MMHG | BODY MASS INDEX: 35.17 KG/M2
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
## 2018-09-28 NOTE — MR AVS SNAPSHOT
[address] [facility] [address] 
[phone] Patient: Chang Scott MRN: [id number] :[id number] Visit Information Date & Time Provider Department Dept. Phone Encounter #  
 2018  5:45 PM [address] [facility] [phone] [id number] Upcoming Health Maintenance Date Due  
 EYE EXAM RETINAL OR DILATED Q1 10/14/1952 DTaP/Tdap/Td series (1 - Tdap) 10/14/1963 Shingrix Vaccine Age 50> (1 of 2) 10/14/1992 GLAUCOMA SCREENING Q2Y 10/14/2007 FOOT EXAM Q1 2014 MICROALBUMIN Q1 2016 LIPID PANEL Q1 2016 HEMOGLOBIN A1C Q6M 2017 Pneumococcal 65+ Low/Medium Risk (2 of 2 - PPSV23) 7/10/2017 Influenza Age 5 to Adult 2018 MEDICARE YEARLY EXAM 2018 Allergies as of 2018  Review Complete On: 2018 By: Eugene Mckenzie DO Severity Noted Reaction Type Reactions Codeine High 2010   Intolerance Shortness of Breath Crestor [Rosuvastatin]  2012    Other (comments) Leg pains and could not walk Lipitor [Atorvastatin]  10/10/2012    Myalgia Tetanus Vaccines And Toxoid  2012    Swelling Current Immunizations  Reviewed on 2014 Name Date Influenza Vaccine PF  Deferred (Patient Refused) ZZZ-RETIRED (DO NOT USE) Pneumococcal Vaccine (Unspecified Type) 7/10/2012 Not reviewed this visit You Were Diagnosed With   
  
 Codes Comments Cat scratch    -  Primary ICD-10-CM: Q08. [address] ICD-9-CM: 919.0, E906.8 Vitals BP Pulse Temp Resp Height(growth percentile) Weight(growth percentile) 147/64 92 97.9 °F (36.6 °C) 16 5' 4\" (1.626 m) 206 lb (93.4 kg) SpO2 BMI OB Status Smoking Status 95% 35.36 kg/m2 Hysterectomy Former Smoker BMI and BSA Data Body Mass Index Body Surface Area  
 35.36 kg/m 2 2.05 m 2 Preferred Pharmacy Pharmacy Name Phone [id number] [place], [address] [facility] [phone] Your Updated Medication List  
  
   
This list is accurate as of 9/28/18  6:27 PM.  Always use your most recent med list.  
  
  
  
  
 ACCU-CHEK SHELIA PLUS TEST STRP strip Generic drug:  glucose blood VI test strips TEST BLOOD SUAGR TWICE A DAY  
  
 aspirin 81 mg chewable tablet Take 1 Tab by mouth daily. BEVESPI AEROSPHERE 9-4.8 mcg Hfaa Generic drug:  glycopyrrolate-formoterol Take  by inhalation. carvedilol 6.25 mg tablet Commonly known as:  Phineas Hoar Take 1 Tab by mouth two (2) times daily (with meals). CLARITIN 10 mg tablet Generic drug:  loratadine Take 10 mg by mouth daily as needed for Allergies. CO Q-10 50 mg capsule Generic drug:  co-enzyme Q-10 Take 50 mg by mouth daily. DULoxetine 60 mg capsule Commonly known as:  CYMBALTA Take 1 capsule by mouth  every day  
  
 ezetimibe 10 mg tablet Commonly known as:  ZETIA  
  
 fenofibrate nanocrystallized 145 mg tablet Commonly known as:  [employer] Take 1 tablet by mouth  daily FISH OIL 1,000 mg Cap Generic drug:  omega-3 fatty acids-vitamin e Take 1 Cap by mouth daily. cholesterol  
  
 fluticasone-salmeterol 100-50 mcg/dose diskus inhaler Commonly known as:  ADVAIR DISKUS Take 1 Puff by inhalation every twelve (12) hours. * glimepiride 2 mg tablet Commonly known as:  AMARYL Take 1 Tab by mouth two (2) times a day for 15 days. * glimepiride 2 mg tablet Commonly known as:  AMARYL  
TAKE 3 TABLETS BY MOUTH DAILY AS DIRECTED  
  
 LANTUS SOLOSTAR U-100 INSULIN 100 unit/mL (3 mL) Inpn Generic drug:  insulin glargine INJECT 30 UNITS DAILY  
  
 lisinopril 10 mg tablet Commonly known as:  Tinajero Reynaldo Take 0.5 Tabs by mouth daily. NASONEX 50 mcg/actuation nasal spray Generic drug:  mometasone 2 Sprays by Both Nostrils route daily. omeprazole 20 mg capsule Commonly known as:  PRILOSEC Take 1 capsule by mouth  daily  
  
 pravastatin 80 mg tablet Commonly known as:  PRAVACHOL Take 1 tablet by mouth  nightly * PROVENTIL HFA 90 mcg/actuation inhaler Generic drug:  albuterol Take 2 Puffs by inhalation every four (4) hours as needed. Indications: BRONCHOSPASM PREVENTION  
  
 * albuterol 2.5 mg /3 mL (0.083 %) nebulizer solution Commonly known as:  PROVENTIL VENTOLIN  
3 mL by Nebulization route every four (4) hours as needed for Wheezing. THERA tablet Generic drug:  therapeutic multivitamin Take 1 Tab by mouth daily. tiotropium 18 mcg inhalation capsule Commonly known as:  Ashok Dux Take 1 Cap by inhalation daily. * Notice: This list has 4 medication(s) that are the same as other medications prescribed for you. Read the directions carefully, and ask your doctor or other care provider to review them with you. Patient Instructions Rest and seek medical care for increased problems, any questions or concern including but not limited to the ones discussed with you here today. Keep the injured area clean and dry using soap and water 2 times daily and watching for signs of infection such as increased pain, redness, or swelling. The information in the following may be helpful to review. Skin Tears: Care Instructions Your Care Instructions As we get older, our skin gets drier and more fragile. Sometimes this can cause the outer layers of skin to split and tear open. Skin tears are treated in different ways. In some cases, doctors use pieces of tape called Steri-Strips to pull the skin together and help it heal. Other times, it's best to leave the tear open and cover it with a special wound-care bandage. Skin tears are usually not serious. They usually heal in a few weeks.  But how long you take to heal depends on your body and the type of tear you have. Sometimes the torn piece of skin is used to protect the wound while it heals. But that piece of skin does not heal. It may fall off on its own. Or the doctor may remove it. As your tear heals, it's important to keep it clean to help prevent infection. The doctor has checked you carefully, but problems can develop later. If you notice any problems or new symptoms, get medical treatment right away. Follow-up care is a key part of your treatment and safety. Be sure to make and go to all appointments, and call your doctor if you are having problems. It's also a good idea to know your test results and keep a list of the medicines you take. How can you care for yourself at home? · If you have pain, ask your doctor if you can take an over-the-counter pain medicine, such as acetaminophen (Tylenol), ibuprofen (Advil, Motrin), or naproxen (Aleve). Be safe with medicines. Read and follow all instructions on the label. · If you have a bandage, follow your doctor's instructions for changing it. · If you have Steri-Strips, leave them on until they fall off. · Follow your doctor's instructions about bathing. · Gently wash the skin tear with plain water 2 times a day. Do not rub the area. · Let the area air dry. Or you can pat it carefully with a soft towel. When should you call for help? Call your doctor now or seek immediate medical care if: 
  · You have signs of infection, such as: 
¨ Increased pain, swelling, warmth, or redness around the tear. ¨ Red streaks leading from the tear. ¨ Pus draining from the tear. ¨ A fever.  
  · The tear starts to bleed a lot. Small amounts of blood are normal.  
 Watch closely for changes in your health, and be sure to contact your doctor if: 
  · You do not get better as expected. Where can you learn more? Go to http://moses-danfe.info/. Enter M680 in the search box to learn more about \"Skin Tears: Care Instructions. \" 
 Current as of: November 20, 2017 Content Version: 11.7 © 6457-4016 [employer], [employer]. Care instructions adapted under license by [employer] (which disclaims liability or warranty for this information). If you have questions about a medical condition or this instruction, always ask your healthcare professional. [address] any warranty or liability for your use of this information. Introducing [facility]! [facility] introduces Navionics patient portal. Now you can access parts of your medical record, email your doctor's office, and request medication refills online. 1. In your internet browser, go to [email or web address] 2. Click on the First Time User? Click Here link in the Sign In box. You will see the New Member Sign Up page. 3. Enter your Navionics Access Code exactly as it appears below. You will not need to use this code after youve completed the sign-up process. If you do not sign up before the expiration date, you must request a new code. · Navionics Access Code: [email or web address] Expires: 12/27/2018  5:43 PM 
 
4. Enter the last four digits of your Social Security Number (xxxx) and Date of Birth (mm/dd/yyyy) as indicated and click Submit. You will be taken to the next sign-up page. 5. Create a Navionics ID. This will be your Navionics login ID and cannot be changed, so think of one that is secure and easy to remember. 6. Create a Navionics password. You can change your password at any time. 7. Enter your Password Reset Question and Answer. This can be used at a later time if you forget your password. 8. Enter your e-mail address. You will receive e-mail notification when new information is available in [address] 9. Click Sign Up. You can now view and download portions of your medical record. 10. Click the Download Summary menu link to download a portable copy of your medical information. If you have questions, please visit the Frequently Asked Questions section of the [email or web address] website. Remember, Nadanu is NOT to be used for urgent needs. For medical emergencies, dial 911. Now available from your iPhone and Android! Please provide this summary of care documentation to your next provider. Your primary care clinician is listed as Sharda Guzman. If you have any questions after today's visit, please call [phone].

## 2018-09-28 NOTE — PATIENT INSTRUCTIONS
Rest and seek medical care for increased problems, any questions or concern including but not limited to the ones discussed with you here today. Keep the injured area clean and dry using soap and water 2 times daily and watching for signs of infection such as increased pain, redness, or swelling. The information in the following may be helpful to review. Skin Tears: Care Instructions  Your Care Instructions  As we get older, our skin gets drier and more fragile. Sometimes this can cause the outer layers of skin to split and tear open. Skin tears are treated in different ways. In some cases, doctors use pieces of tape called Steri-Strips to pull the skin together and help it heal. Other times, it's best to leave the tear open and cover it with a special wound-care bandage. Skin tears are usually not serious. They usually heal in a few weeks. But how long you take to heal depends on your body and the type of tear you have. Sometimes the torn piece of skin is used to protect the wound while it heals. But that piece of skin does not heal. It may fall off on its own. Or the doctor may remove it. As your tear heals, it's important to keep it clean to help prevent infection. The doctor has checked you carefully, but problems can develop later. If you notice any problems or new symptoms, get medical treatment right away. Follow-up care is a key part of your treatment and safety. Be sure to make and go to all appointments, and call your doctor if you are having problems. It's also a good idea to know your test results and keep a list of the medicines you take. How can you care for yourself at home? · If you have pain, ask your doctor if you can take an over-the-counter pain medicine, such as acetaminophen (Tylenol), ibuprofen (Advil, Motrin), or naproxen (Aleve). Be safe with medicines. Read and follow all instructions on the label.   · If you have a bandage, follow your doctor's instructions for changing it.  · If you have Steri-Strips, leave them on until they fall off. · Follow your doctor's instructions about bathing. · Gently wash the skin tear with plain water 2 times a day. Do not rub the area. · Let the area air dry. Or you can pat it carefully with a soft towel. When should you call for help? Call your doctor now or seek immediate medical care if:    · You have signs of infection, such as:  ¨ Increased pain, swelling, warmth, or redness around the tear. ¨ Red streaks leading from the tear. ¨ Pus draining from the tear. ¨ A fever.     · The tear starts to bleed a lot. Small amounts of blood are normal.    Watch closely for changes in your health, and be sure to contact your doctor if:    · You do not get better as expected. Where can you learn more? Go to http://moses-dafne.info/. Enter J763 in the search box to learn more about \"Skin Tears: Care Instructions. \"  Current as of: November 20, 2017  Content Version: 11.7  © 0432-0946 [employer]. Care instructions adapted under license by [employer] (which disclaims liability or warranty for this information). If you have questions about a medical condition or this instruction, always ask your healthcare professional. [address] any warranty or liability for your use of this information.

## 2018-09-28 NOTE — PROGRESS NOTES
Patient is a 78 y.o. female presenting with cat scratch. The history is provided by the patient (scratched by her cat at 3:15 while in an fight with a dog. UTD on shots ). Cat scratch   This is a new problem. The current episode started 3 to 5 hours ago. The problem occurs constantly. The problem has not changed (mid local swellign and tenderness) since onset. Nothing aggravates the symptoms. Nothing relieves the symptoms. She has tried nothing for the symptoms.         Past Medical History:   Diagnosis Date    Anemia NEC     Arthritis     Asthma     CAD (coronary artery disease)     NSTEMI following PCI    Calculus of kidney 7-    [facility]    Cancer ([facility] [address]     skin    Chronic pain     degenerative disc disease, lower right back    COPD     Depression     Diabetes ([address]     GERD (gastroesophageal reflux disease)     Hx of mammogram 11/29/2017    [address] [facility] - No mammographic evidence of malignancy - annual follow up recommended     Hypertension     2005 Dx    Joint pain     Morbid obesity ([address]     Psychiatric disorder     depression    Sleep disorder         Past Surgical History:   Procedure Laterality Date    CARDIAC SURG PROCEDURE UNLIST      stents    HX APPENDECTOMY      in 30's    HX CORONARY STENT PLACEMENT  dec 2013    HX HYSTERECTOMY      HX LITHOTRIPSY      HX TRABECULECTOMY      HX TUBAL LIGATION           Family History   Problem Relation Age of Onset    Arthritis-rheumatoid Sister     Osteoporosis Sister     Diabetes Brother     Stroke Brother     Elevated Lipids Mother     Alcohol abuse Father     Elevated Lipids Father     Cancer Sister     Diabetes Sister     Cancer Sister     Diabetes Brother     Diabetes Brother     Heart Disease Brother     Diabetes Brother     Diabetes Brother     Heart Disease Brother     Diabetes Maternal Aunt         Social History     Social History    Marital status:      Spouse name: N/A    Number of children: N/A    Years of education: N/A     Occupational History    Not on file. Social History Main Topics    Smoking status: Former Smoker     Packs/day: 1.00     Years: 45.00     Quit date: 1/1/2013    Smokeless tobacco: Never Used    Alcohol use No    Drug use: No    Sexual activity: No     Other Topics Concern    Not on file     Social History Narrative                ALLERGIES: Codeine; Crestor [rosuvastatin]; Lipitor [atorvastatin]; and Tetanus vaccines and toxoid    Review of Systems   Constitutional: Negative. Musculoskeletal: Negative. Skin: Positive for wound. Neurological: Negative. Vitals:    09/28/18 1808   BP: 147/64   Pulse: 92   Resp: 16   Temp: 97.9 °F (36.6 °C)   SpO2: 95%   Weight: 206 lb (93.4 kg)   Height: 5' 4\" (1.626 m)       Physical Exam   Constitutional: She is oriented to person, place, and time. She appears well-developed and well-nourished. No distress. HENT:   Mouth/Throat: Oropharynx is clear and moist.   Cardiovascular: Normal rate, regular rhythm and normal heart sounds. Pulmonary/Chest: Effort normal.   Decreased breath sounds throughout. Pt on O2   Musculoskeletal: Normal range of motion. She exhibits edema and tenderness. She exhibits no deformity. See skin below for details   Neurological: She is alert and oriented to person, place, and time. Skin: Skin is warm and dry. No rash noted. No erythema. Scattered abrasions on the left lower leg, posteriorly with a few scattered scabs. Area is moderately tender to palpation, no erythema there is mild local swelling. NVI distally   Nursing note and vitals reviewed. MDM    Procedures                         ICD-10-CM ICD-9-CM    1. Cat scratch W55. 03XA 919.0      E906.8      No orders of the defined types were placed in this encounter. The patients condition was discussed with the patient and they understand.   The patient is to follow up with primary care doctor ,If signs and symptoms become worse the pt is to go to the ER. The patient is to take medications as prescribed.              Wound care and close fu

## 2018-12-20 ENCOUNTER — HOSPITAL ENCOUNTER (OUTPATIENT)
Dept: GENERAL RADIOLOGY | Age: 78
Discharge: HOME | End: 2018-12-20
Payer: MEDICARE

## 2018-12-20 DIAGNOSIS — J44.9 COPD (CHRONIC OBSTRUCTIVE PULMONARY DISEASE) (HCC): ICD-10-CM

## 2018-12-20 PROCEDURE — 71046 X-RAY EXAM CHEST 2 VIEWS: CPT

## 2019-06-21 ENCOUNTER — HOSPITAL ENCOUNTER (EMERGENCY)
Age: 79
Discharge: HOME | End: 2019-06-21
Attending: EMERGENCY MEDICINE
Payer: MEDICARE

## 2019-06-21 VITALS
BODY MASS INDEX: 34.15 KG/M2 | RESPIRATION RATE: 21 BRPM | TEMPERATURE: 97.4 F | WEIGHT: 200 LBS | HEART RATE: 8 BPM | HEIGHT: 64 IN | SYSTOLIC BLOOD PRESSURE: 157 MMHG | OXYGEN SATURATION: 3 % | DIASTOLIC BLOOD PRESSURE: 79 MMHG

## 2019-06-21 DIAGNOSIS — E86.0 MODERATE DEHYDRATION: ICD-10-CM

## 2019-06-21 DIAGNOSIS — K21.9 GASTROESOPHAGEAL REFLUX DISEASE WITHOUT ESOPHAGITIS: ICD-10-CM

## 2019-06-21 DIAGNOSIS — R73.9 HYPERGLYCEMIA WITHOUT KETOSIS: Primary | ICD-10-CM

## 2019-06-21 LAB
ALBUMIN SERPL-MCNC: 3.4 G/DL (ref 3.5–5)
ALBUMIN/GLOB SERPL: 1 {RATIO} (ref 1.1–2.2)
ALP SERPL-CCNC: 118 U/L (ref 45–117)
ALT SERPL-CCNC: 28 U/L (ref 12–78)
ANION GAP SERPL CALC-SCNC: 8 MMOL/L (ref 5–15)
APPEARANCE UR: CLEAR
AST SERPL-CCNC: 35 U/L (ref 15–37)
ATRIAL RATE: 88 BPM
BACTERIA URNS QL MICRO: NEGATIVE /HPF
BASOPHILS # BLD: 0 K/UL (ref 0–0.1)
BASOPHILS NFR BLD: 0 % (ref 0–1)
BILIRUB SERPL-MCNC: 0.6 MG/DL (ref 0.2–1)
BILIRUB UR QL: NEGATIVE
BUN SERPL-MCNC: 21 MG/DL (ref 6–20)
BUN/CREAT SERPL: 14 (ref 12–20)
CALCIUM SERPL-MCNC: 10.9 MG/DL (ref 8.5–10.1)
CALCULATED P AXIS, ECG09: 57 DEGREES
CALCULATED R AXIS, ECG10: 22 DEGREES
CALCULATED T AXIS, ECG11: 60 DEGREES
CHLORIDE SERPL-SCNC: 104 MMOL/L (ref 97–108)
CO2 SERPL-SCNC: 28 MMOL/L (ref 21–32)
COLOR UR: ABNORMAL
COMMENT, HOLDF: NORMAL
CREAT SERPL-MCNC: 1.51 MG/DL (ref 0.55–1.02)
DIAGNOSIS, 93000: NORMAL
DIFFERENTIAL METHOD BLD: NORMAL
EOSINOPHIL # BLD: 0.1 K/UL (ref 0–0.4)
EOSINOPHIL NFR BLD: 2 % (ref 0–7)
EPITH CASTS URNS QL MICRO: ABNORMAL /LPF
ERYTHROCYTE [DISTWIDTH] IN BLOOD BY AUTOMATED COUNT: 12.9 % (ref 11.5–14.5)
GLOBULIN SER CALC-MCNC: 3.5 G/DL (ref 2–4)
GLUCOSE BLD STRIP.AUTO-MCNC: 137 MG/DL (ref 65–100)
GLUCOSE SERPL-MCNC: 352 MG/DL (ref 65–100)
GLUCOSE UR STRIP.AUTO-MCNC: >1000 MG/DL
HCT VFR BLD AUTO: 37.8 % (ref 35–47)
HGB BLD-MCNC: 12.8 G/DL (ref 11.5–16)
HGB UR QL STRIP: NEGATIVE
IMM GRANULOCYTES # BLD AUTO: 0 K/UL (ref 0–0.04)
IMM GRANULOCYTES NFR BLD AUTO: 0 % (ref 0–0.5)
KETONES UR QL STRIP.AUTO: ABNORMAL MG/DL
LEUKOCYTE ESTERASE UR QL STRIP.AUTO: NEGATIVE
LIPASE SERPL-CCNC: 169 U/L (ref 73–393)
LYMPHOCYTES # BLD: 1.3 K/UL (ref 0.8–3.5)
LYMPHOCYTES NFR BLD: 25 % (ref 12–49)
MCH RBC QN AUTO: 31.1 PG (ref 26–34)
MCHC RBC AUTO-ENTMCNC: 33.9 G/DL (ref 30–36.5)
MCV RBC AUTO: 92 FL (ref 80–99)
MONOCYTES # BLD: 0.5 K/UL (ref 0–1)
MONOCYTES NFR BLD: 9 % (ref 5–13)
NEUTS SEG # BLD: 3.2 K/UL (ref 1.8–8)
NEUTS SEG NFR BLD: 64 % (ref 32–75)
NITRITE UR QL STRIP.AUTO: NEGATIVE
NRBC # BLD: 0 K/UL (ref 0–0.01)
NRBC BLD-RTO: 0 PER 100 WBC
P-R INTERVAL, ECG05: 122 MS
PH UR STRIP: 5 [PH] (ref 5–8)
PLATELET # BLD AUTO: 223 K/UL (ref 150–400)
PMV BLD AUTO: 9.8 FL (ref 8.9–12.9)
POTASSIUM SERPL-SCNC: 4 MMOL/L (ref 3.5–5.1)
PROT SERPL-MCNC: 6.9 G/DL (ref 6.4–8.2)
PROT UR STRIP-MCNC: NEGATIVE MG/DL
Q-T INTERVAL, ECG07: 356 MS
QRS DURATION, ECG06: 84 MS
QTC CALCULATION (BEZET), ECG08: 430 MS
RBC # BLD AUTO: 4.11 M/UL (ref 3.8–5.2)
RBC #/AREA URNS HPF: ABNORMAL /HPF (ref 0–5)
SAMPLES BEING HELD,HOLD: NORMAL
SERVICE CMNT-IMP: ABNORMAL
SODIUM SERPL-SCNC: 140 MMOL/L (ref 136–145)
SP GR UR REFRACTOMETRY: 1.03 (ref 1–1.03)
TROPONIN I SERPL-MCNC: <0.05 NG/ML
UR CULT HOLD, URHOLD: NORMAL
UROBILINOGEN UR QL STRIP.AUTO: 1 EU/DL (ref 0.2–1)
VENTRICULAR RATE, ECG03: 88 BPM
WBC # BLD AUTO: 5.1 K/UL (ref 3.6–11)
WBC URNS QL MICRO: ABNORMAL /HPF (ref 0–4)

## 2019-06-21 PROCEDURE — 74011636637 HC RX REV CODE- 636/637: Performed by: EMERGENCY MEDICINE

## 2019-06-21 PROCEDURE — 81001 URINALYSIS AUTO W/SCOPE: CPT

## 2019-06-21 PROCEDURE — 84484 ASSAY OF TROPONIN QUANT: CPT

## 2019-06-21 PROCEDURE — 74011000250 HC RX REV CODE- 250: Performed by: EMERGENCY MEDICINE

## 2019-06-21 PROCEDURE — 85025 COMPLETE CBC W/AUTO DIFF WBC: CPT

## 2019-06-21 PROCEDURE — 96374 THER/PROPH/DIAG INJ IV PUSH: CPT

## 2019-06-21 PROCEDURE — 93005 ELECTROCARDIOGRAM TRACING: CPT

## 2019-06-21 PROCEDURE — 80053 COMPREHEN METABOLIC PANEL: CPT

## 2019-06-21 PROCEDURE — 74011250637 HC RX REV CODE- 250/637: Performed by: EMERGENCY MEDICINE

## 2019-06-21 PROCEDURE — 83690 ASSAY OF LIPASE: CPT

## 2019-06-21 PROCEDURE — 36415 COLL VENOUS BLD VENIPUNCTURE: CPT

## 2019-06-21 PROCEDURE — 74011250636 HC RX REV CODE- 250/636: Performed by: EMERGENCY MEDICINE

## 2019-06-21 PROCEDURE — 82962 GLUCOSE BLOOD TEST: CPT

## 2019-06-21 PROCEDURE — 96375 TX/PRO/DX INJ NEW DRUG ADDON: CPT

## 2019-06-21 PROCEDURE — 96361 HYDRATE IV INFUSION ADD-ON: CPT

## 2019-06-21 PROCEDURE — 99285 EMERGENCY DEPT VISIT HI MDM: CPT

## 2019-06-21 RX ORDER — RANITIDINE 150 MG/1
150 TABLET, FILM COATED ORAL 2 TIMES DAILY
Qty: 20 TAB | Refills: 0 | Status: SHIPPED | OUTPATIENT
Start: 2019-06-21 | End: 2019-07-01

## 2019-06-21 RX ORDER — SUCRALFATE 1 G/1
1 TABLET ORAL 4 TIMES DAILY
Qty: 40 TAB | Refills: 0 | Status: SHIPPED | OUTPATIENT
Start: 2019-06-21 | End: 2019-07-01

## 2019-06-21 RX ORDER — ONDANSETRON 2 MG/ML
4 INJECTION INTRAMUSCULAR; INTRAVENOUS
Status: DISCONTINUED | OUTPATIENT
Start: 2019-06-21 | End: 2019-06-21 | Stop reason: HOSPADM

## 2019-06-21 RX ADMIN — HUMAN INSULIN 10 UNITS: 100 INJECTION, SOLUTION SUBCUTANEOUS at 18:03

## 2019-06-21 RX ADMIN — ONDANSETRON 4 MG: 2 INJECTION INTRAMUSCULAR; INTRAVENOUS at 16:12

## 2019-06-21 RX ADMIN — SODIUM CHLORIDE 1000 ML: 900 INJECTION, SOLUTION INTRAVENOUS at 18:03

## 2019-06-21 RX ADMIN — LIDOCAINE HYDROCHLORIDE 40 ML: 20 SOLUTION ORAL; TOPICAL at 16:12

## 2019-06-21 RX ADMIN — SODIUM CHLORIDE 1000 ML: 900 INJECTION, SOLUTION INTRAVENOUS at 16:12

## 2019-06-21 NOTE — ED TRIAGE NOTES
Triage note: Pt arrives with c/o sudden onset abdominal burning followed by explosive diarrhea while at [facility] today. Pt states this pain happens intermittently over the last week. Pt states this pain worsens when she eats. Denies vomiting but feels like she could vomit.

## 2019-06-21 NOTE — ED PROVIDER NOTES
70 y.o. female with past medical history significant for HTN, GERD, anemia, DM, asthma, joint pain, depression, COPD, CAD, morbid obesity, chronic pain, kidney stone, arthritis, cancer, psychiatric disorder, and sleep disorder who presents from home via EMS with chief complaint of abdominal pain. Pt reports intermittent burning epigastric abdominal pain for 1-2 weeks after eating seafood 2 weeks ago accompanied by nausea, decreased appetite, decreased urination, belching, and diarrhea (<10 episodes/day). She states she has hx of GERD for which she takes pantoprazole; she took an additional dose yesterday d/t pain w/ minimal relief. She has recently been taking 4 generic Pepto-Bismol tablets per day w/ brief relief. She states she takes 40 units of Lantus and Metformin during mornings, but she has recently missed some doses. Pt notes her blood glucose was 139 this morning. Pt denies vomiting. There are no other acute medical concerns at this time.     PCP: Yair Cody MD    Note written by Ray Huntley, as dictated by Dagmar Barron MD 4:11 PM           Past Medical History:   Diagnosis Date    Anemia NEC     Arthritis     Asthma     CAD (coronary artery disease)     NSTEMI following PCI    Calculus of kidney 7-    [address] [place] [address]    Cancer ([address]     skin    Chronic pain     degenerative disc disease, lower right back    COPD     Depression     Diabetes ([address]     GERD (gastroesophageal reflux disease)     Hx of mammogram 11/29/2017    [address] [facility] - No mammographic evidence of malignancy - annual follow up recommended     Hypertension     2005 Dx    Joint pain     Morbid obesity ([address]     Psychiatric disorder     depression    Sleep disorder        Past Surgical History:   Procedure Laterality Date    CARDIAC SURG PROCEDURE UNLIST      stents    HX APPENDECTOMY      in 30's    HX CORONARY STENT PLACEMENT  dec 2013    HX HYSTERECTOMY      HX LITHOTRIPSY      HX TRABECULECTOMY      HX TUBAL LIGATION           Family History:   Problem Relation Age of Onset    Arthritis-rheumatoid Sister     Osteoporosis Sister     Diabetes Brother     Stroke Brother     Elevated Lipids Mother     Alcohol abuse Father     Elevated Lipids Father     Cancer Sister     Diabetes Sister     Cancer Sister     Diabetes Brother     Diabetes Brother     Heart Disease Brother     Diabetes Brother     Diabetes Brother     Heart Disease Brother     Diabetes Maternal Aunt        Social History     Socioeconomic History    Marital status:      Spouse name: Not on file    Number of children: Not on file    Years of education: Not on file    Highest education level: Not on file   Occupational History    Not on file   Social Needs    Financial resource strain: Not on file    Food insecurity:     Worry: Not on file     Inability: Not on file    Transportation needs:     Medical: Not on file     Non-medical: Not on file   Tobacco Use    Smoking status: Former Smoker     Packs/day: 1.00     Years: 45.00     Pack years: 45.00     Last attempt to quit: 2013     Years since quittin.4    Smokeless tobacco: Never Used   Substance and Sexual Activity    Alcohol use: No    Drug use: No    Sexual activity: Never   Lifestyle    Physical activity:     Days per week: Not on file     Minutes per session: Not on file    Stress: Not on file   Relationships    Social connections:     Talks on phone: Not on file     Gets together: Not on file     Attends Judaism service: Not on file     Active member of club or organization: Not on file     Attends meetings of clubs or organizations: Not on file     Relationship status: Not on file    Intimate partner violence:     Fear of current or ex partner: Not on file     Emotionally abused: Not on file     Physically abused: Not on file     Forced sexual activity: Not on file   Other Topics Concern    Not on file   Social History Narrative    Not on file         ALLERGIES: Codeine; Crestor [rosuvastatin]; Lipitor [atorvastatin]; and Tetanus vaccines and toxoid    Review of Systems   Constitutional: Positive for appetite change. Negative for fever. Cardiovascular: Negative for chest pain and leg swelling. Gastrointestinal: Positive for abdominal pain and nausea. Genitourinary: Positive for decreased urine volume. Negative for hematuria. Neurological: Negative for dizziness and light-headedness. All other systems reviewed and are negative. Vitals:    06/21/19 1533   BP: 144/62   Pulse: 87   Resp: 22   Temp: 97.9 °F (36.6 °C)   SpO2: 97%   Weight: 90.7 kg (200 lb)   Height: 5' 4\" (1.626 m)            Physical Exam   Constitutional: She appears well-developed and well-nourished. No distress. HENT:   Head: Normocephalic and atraumatic. Eyes: Conjunctivae are normal.   Neck: Neck supple. Cardiovascular: Normal rate, regular rhythm and normal heart sounds. Pulmonary/Chest: Effort normal and breath sounds normal. No respiratory distress. Abdominal: Soft. Bowel sounds are normal. She exhibits no distension. There is no tenderness. There is no rebound and no guarding. Musculoskeletal: Normal range of motion. She exhibits no deformity. Neurological: She is alert. No cranial nerve deficit. Skin: Skin is warm and dry. Psychiatric: Her behavior is normal.   Nursing note and vitals reviewed. Note written by Ray Stuart, as dictated by Louis Bryson MD 4:11 PM    MDM     70 y.o. female presents with persistent heartburn which caused her to be noncompliant with insulin therapy since she did not feel well. This apparently caused some hyperglycemia which subsequently dehydrated her. She was provided 2L of IVF for resuscitation and IV insulin to help with glycemic control. She was instructed to resume her typical regimen and increase oral fluid intake.  Last HR and SpO2 level are incorrectly charted, Pt had no hypoxemia or bradycardia during her ED course. Procedures      ED EKG interpretation:  Rhythm: normal sinus rhythm; and regular . Rate (approx.): 85; Axis: normal; ST/T wave: nonspecific diffuse T wave flattening;    Note written by Ray Grullon, as dictated by Sigifredo Pacheco MD 4:11 PM

## 2019-07-30 ENCOUNTER — ANESTHESIA EVENT (OUTPATIENT)
Dept: ENDOSCOPY | Age: 79
End: 2019-07-30
Payer: MEDICARE

## 2019-07-30 ENCOUNTER — ANESTHESIA (OUTPATIENT)
Dept: ENDOSCOPY | Age: 79
End: 2019-07-30
Payer: MEDICARE

## 2019-07-30 ENCOUNTER — HOSPITAL ENCOUNTER (OUTPATIENT)
Age: 79
Setting detail: OUTPATIENT SURGERY
Discharge: HOME | End: 2019-07-30
Attending: INTERNAL MEDICINE | Admitting: INTERNAL MEDICINE
Payer: MEDICARE

## 2019-07-30 VITALS
DIASTOLIC BLOOD PRESSURE: 89 MMHG | RESPIRATION RATE: 13 BRPM | OXYGEN SATURATION: 99 % | HEIGHT: 64 IN | WEIGHT: 190 LBS | HEART RATE: 87 BPM | TEMPERATURE: 97.8 F | SYSTOLIC BLOOD PRESSURE: 161 MMHG | BODY MASS INDEX: 32.44 KG/M2

## 2019-07-30 LAB
GLUCOSE BLD STRIP.AUTO-MCNC: 157 MG/DL (ref 65–100)
SERVICE CMNT-IMP: ABNORMAL

## 2019-07-30 PROCEDURE — 76060000031 HC ANESTHESIA FIRST 0.5 HR: Performed by: INTERNAL MEDICINE

## 2019-07-30 PROCEDURE — 88305 TISSUE EXAM BY PATHOLOGIST: CPT

## 2019-07-30 PROCEDURE — 77030039825 HC MSK NSL PAP SUPERNO2VA VYRM -B: Performed by: ANESTHESIOLOGY

## 2019-07-30 PROCEDURE — 74011250636 HC RX REV CODE- 250/636: Performed by: INTERNAL MEDICINE

## 2019-07-30 PROCEDURE — 76040000019: Performed by: INTERNAL MEDICINE

## 2019-07-30 PROCEDURE — 74011250636 HC RX REV CODE- 250/636

## 2019-07-30 PROCEDURE — 77030013992 HC SNR POLYP ENDOSC BSC -B: Performed by: INTERNAL MEDICINE

## 2019-07-30 PROCEDURE — 77030019988 HC FCPS ENDOSC DISP BSC -B: Performed by: INTERNAL MEDICINE

## 2019-07-30 PROCEDURE — 82962 GLUCOSE BLOOD TEST: CPT

## 2019-07-30 RX ORDER — PROPOFOL 10 MG/ML
INJECTION, EMULSION INTRAVENOUS AS NEEDED
Status: DISCONTINUED | OUTPATIENT
Start: 2019-07-30 | End: 2019-07-30 | Stop reason: HOSPADM

## 2019-07-30 RX ORDER — NALOXONE HYDROCHLORIDE 0.4 MG/ML
0.4 INJECTION, SOLUTION INTRAMUSCULAR; INTRAVENOUS; SUBCUTANEOUS
Status: DISCONTINUED | OUTPATIENT
Start: 2019-07-30 | End: 2019-07-30 | Stop reason: HOSPADM

## 2019-07-30 RX ORDER — SODIUM CHLORIDE 0.9 % (FLUSH) 0.9 %
5-40 SYRINGE (ML) INJECTION AS NEEDED
Status: DISCONTINUED | OUTPATIENT
Start: 2019-07-30 | End: 2019-07-30 | Stop reason: HOSPADM

## 2019-07-30 RX ORDER — DEXTROMETHORPHAN/PSEUDOEPHED 2.5-7.5/.8
1.2 DROPS ORAL
Status: DISCONTINUED | OUTPATIENT
Start: 2019-07-30 | End: 2019-07-30 | Stop reason: HOSPADM

## 2019-07-30 RX ORDER — MIDAZOLAM HYDROCHLORIDE 1 MG/ML
.25-5 INJECTION, SOLUTION INTRAMUSCULAR; INTRAVENOUS
Status: DISCONTINUED | OUTPATIENT
Start: 2019-07-30 | End: 2019-07-30 | Stop reason: HOSPADM

## 2019-07-30 RX ORDER — SODIUM CHLORIDE 0.9 % (FLUSH) 0.9 %
5-40 SYRINGE (ML) INJECTION EVERY 8 HOURS
Status: DISCONTINUED | OUTPATIENT
Start: 2019-07-30 | End: 2019-07-30 | Stop reason: HOSPADM

## 2019-07-30 RX ORDER — ATROPINE SULFATE 0.1 MG/ML
0.5 INJECTION INTRAVENOUS
Status: DISCONTINUED | OUTPATIENT
Start: 2019-07-30 | End: 2019-07-30 | Stop reason: HOSPADM

## 2019-07-30 RX ORDER — LIDOCAINE HYDROCHLORIDE 20 MG/ML
INJECTION, SOLUTION EPIDURAL; INFILTRATION; INTRACAUDAL; PERINEURAL AS NEEDED
Status: DISCONTINUED | OUTPATIENT
Start: 2019-07-30 | End: 2019-07-30 | Stop reason: HOSPADM

## 2019-07-30 RX ORDER — SODIUM CHLORIDE 9 MG/ML
75 INJECTION, SOLUTION INTRAVENOUS CONTINUOUS
Status: DISCONTINUED | OUTPATIENT
Start: 2019-07-30 | End: 2019-07-30 | Stop reason: HOSPADM

## 2019-07-30 RX ORDER — FLUMAZENIL 0.1 MG/ML
0.2 INJECTION INTRAVENOUS
Status: DISCONTINUED | OUTPATIENT
Start: 2019-07-30 | End: 2019-07-30 | Stop reason: HOSPADM

## 2019-07-30 RX ORDER — SODIUM CHLORIDE 0.9 % (FLUSH) 0.9 %
5-40 SYRINGE (ML) INJECTION EVERY 8 HOURS
Status: CANCELLED | OUTPATIENT
Start: 2019-07-30

## 2019-07-30 RX ORDER — FENTANYL CITRATE 50 UG/ML
25 INJECTION, SOLUTION INTRAMUSCULAR; INTRAVENOUS
Status: DISCONTINUED | OUTPATIENT
Start: 2019-07-30 | End: 2019-07-30 | Stop reason: HOSPADM

## 2019-07-30 RX ORDER — SODIUM CHLORIDE 0.9 % (FLUSH) 0.9 %
5-40 SYRINGE (ML) INJECTION AS NEEDED
Status: CANCELLED | OUTPATIENT
Start: 2019-07-30

## 2019-07-30 RX ORDER — EPINEPHRINE 0.1 MG/ML
1 INJECTION INTRACARDIAC; INTRAVENOUS
Status: DISCONTINUED | OUTPATIENT
Start: 2019-07-30 | End: 2019-07-30 | Stop reason: HOSPADM

## 2019-07-30 RX ADMIN — SODIUM CHLORIDE 75 ML/HR: 900 INJECTION, SOLUTION INTRAVENOUS at 10:16

## 2019-07-30 RX ADMIN — PROPOFOL 200 MG: 10 INJECTION, EMULSION INTRAVENOUS at 10:42

## 2019-07-30 RX ADMIN — LIDOCAINE HYDROCHLORIDE 100 MG: 20 INJECTION, SOLUTION EPIDURAL; INFILTRATION; INTRACAUDAL; PERINEURAL at 10:21

## 2019-07-30 NOTE — PROCEDURES
NAME:  Karlo White   :   1940   MRN:   [id number]     Date/Time:  2019 10:46 AM    Esophagogastroduodenoscopy (EGD) Procedure Note    Procedure: Esophagogastroduodenoscopy with biopsy    Indication:  Occult blood in stool with possible UGI origin  Pre-operative Diagnosis: see indication above  Post-operative Diagnosis: see findings below  :  Annie Mccurdy MD  Referring Provider:   -Jony Tinoco MD    Exam:  Airway: clear, no airway problems anticipated  Heart: RRR, without gallops or rubs  Lungs: clear bilaterally without wheezes, crackles, or rhonchi  Abdomen: soft, nontender, nondistended, bowel sounds present  Mental Status: awake, alert and oriented to person, place and time     Anethesia/Sedation:  MAC anesthesia Propofol as per colonoscopy  Procedure Details   After informed consent was obtained for the procedure, with all risks and benefits of procedure explained the patient was taken to the endoscopy suite and placed in the left lateral decubitus position. Following sequential administration of sedation as per above, the [id number] gastroscope was inserted into the mouth and advanced under direct vision to second portion of the duodenum. A careful inspection was made as the gastroscope was withdrawn, including a retroflexed view of the proximal stomach; findings and interventions are described below. Findings:    -Normal esophageal mucosa; biopsied to exclude active esophagitis  -Mild stomach erythema (redness); biopsied to exclude gastritis  -Normal duodenum    Therapies:  biopsy of esophagus; biopsy of stomach   Specimens: #1 gastric; #2 distal esophagus   EBL:  None. Complications:   None; patient tolerated the procedure well. Impression:    -Normal esophageal mucosa; biopsied to exclude active esophagitis  -Mild stomach erythema (redness); biopsied to exclude gastritis  -Normal duodenum    Recommendations:  -Await pathology.     Discharge disposition:  Home in the company of  when able to ambulate after colonoscopy    Iron Canada MD

## 2019-07-30 NOTE — ANESTHESIA POSTPROCEDURE EVALUATION
Procedure(s):  ESOPHAGOGASTRODUODENOSCOPY (EGD)  COLONOSCOPY  ESOPHAGOGASTRODUODENAL (EGD) BIOPSY  ENDOSCOPIC POLYPECTOMY. total IV anesthesia    Anesthesia Post Evaluation        Patient location during evaluation: PACU  Note status: Adequate. Level of consciousness: responsive to verbal stimuli and sleepy but conscious  Pain management: satisfactory to patient  Airway patency: patent  Anesthetic complications: no  Cardiovascular status: acceptable  Respiratory status: acceptable  Hydration status: acceptable  Comments: +Post-Anesthesia Evaluation and Assessment    Patient: Emma Lama MRN: [id number]  SSN: [id number]   YOB: 1940  Age: 70 y.o. Sex: female      Cardiovascular Function/Vital Signs    /89   Pulse 87   Temp 36.6 °C (97.8 °F)   Resp 13   Ht 5' 4\" (1.626 m)   Wt 86.2 kg (190 lb)   SpO2 99%   BMI 32.61 kg/m²     Patient is status post Procedure(s):  ESOPHAGOGASTRODUODENOSCOPY (EGD)  COLONOSCOPY  ESOPHAGOGASTRODUODENAL (EGD) BIOPSY  ENDOSCOPIC POLYPECTOMY. Nausea/Vomiting: Controlled. Postoperative hydration reviewed and adequate. Pain:  Pain Scale 1: Numeric (0 - 10) (07/30/19 1123)  Pain Intensity 1: 0 (07/30/19 1123)   Managed. Neurological Status: At baseline. Mental Status and Level of Consciousness: Arousable. Pulmonary Status:   O2 Device: Room air (07/30/19 1123)   Adequate oxygenation and airway patent. Complications related to anesthesia: None    Post-anesthesia assessment completed. No concerns. Signed By: Belle Lopez DO    7/30/2019  Post anesthesia nausea and vomiting:  controlled      Vitals Value Taken Time   /89 7/30/2019 11:23 AM   Temp 36.6 °C (97.8 °F) 7/30/2019 10:54 AM   Pulse 89 7/30/2019 11:25 AM   Resp 11 7/30/2019 11:25 AM   SpO2 0 % 7/30/2019 11:25 AM   Vitals shown include unvalidated device data.

## 2019-07-30 NOTE — PERIOP NOTES
Anesthesia reports 200mg Propofol, 100mg Lidocaine and 400mL NS given during procedure. Received report from anesthesia staff on vital signs and status of patient.

## 2019-07-30 NOTE — PROCEDURES
NAME:  Hal Araujo   :   1940   MRN:   [id number]     Date/Time:  2019 10:52 AM    Colonoscopy Operative Report    Procedure Type:   Colonoscopy with polypectomy (cold snare)     Indications:     Guaiac positive stool  Pre-operative Diagnosis: see indication above  Post-operative Diagnosis:  See findings below  :  Aidan Sterling MD  Referring Provider: -Jesse Jeong MD    Exam:  Airway: clear, no airway problems anticipated  Heart: RRR, without gallops or rubs  Lungs: clear bilaterally without wheezes, crackles, or rhonchi  Abdomen: soft, nontender, nondistended, bowel sounds present  Mental Status: awake, alert and oriented to person, place and time    Sedation:  MAC anesthesia Propofol 200mg IV  Procedure Details:  After informed consent was obtained with all risks and benefits of procedure explained and preoperative exam completed, the patient was taken to the endoscopy suite and placed in the left lateral decubitus position. Upon sequential sedation as per above, a digital rectal exam was performed demonstrating internal hemorrhoids. The Olympus videocolonoscope  was inserted in the rectum and carefully advanced to the cecum, which was identified by the ileocecal valve and appendiceal orifice. The distal terminal ileum was evaluated. The quality of preparation was adequate. The colonoscope was slowly withdrawn with careful evaluation between folds. Retroflexion in the rectum was completed demonstrating internal hemorrhoids. Findings:     -Normal terminal ileum  -Scattered diverticulosis seen throughout the colon  -Single diminutive, benign-appearing 3mm sessile polyp in the sigmoid colon at 40cm; removed with cold snare  -Small grade 1 internal hemorrhoids    Specimen Removed:  #3 sigmoid polyp  Complications: None. EBL:  None.     Impression:    -Normal terminal ileum  -Scattered diverticulosis seen throughout the colon  -Single diminutive, benign-appearing 3mm sessile polyp in the sigmoid colon at 40cm; removed with cold snare  -Small grade 1 internal hemorrhoids    Recommendations: --Await pathology. , -If adenoma is present, repeat colonoscopy in 5 years. High fiber diet. Resume normal medication(s). You will receive a letter about the biopsy results in about 10 days. You may be asked to call your doctor's office for the results. Discharge Disposition:  Home in the company of a  when able to ambulate.     Devan Rueda MD

## 2019-07-30 NOTE — ROUTINE PROCESS
Corazon Castillo  1940  [id number]    Situation:  Verbal report received from: MAMTA Sanches RN  Procedure: Procedure(s):  ESOPHAGOGASTRODUODENOSCOPY (EGD)  COLONOSCOPY  ESOPHAGOGASTRODUODENAL (EGD) BIOPSY  ENDOSCOPIC POLYPECTOMY    Background:    Preoperative diagnosis: heme positive stool  Postoperative diagnosis: EGD- Esophagitis  Colon - Hemorrhoids, colon polyp, diverticulosis    :  Dr. Galen Álvarez  Assistant(s): Endoscopy Technician-1: Lorene Primrose  Endoscopy RN-1: Jody Huffman RN    Specimens:   ID Type Source Tests Collected by Time Destination   1 : Gastric biopsy Preservative Gastric  Rogerio Garcia MD 7/30/2019 1026 Pathology   2 : [place] biopsy Preservative   Rogerio Garcia MD 7/30/2019 1030 Pathology   3 : Sigmoid colon polyp Preservative   Rogerio Garcia MD 7/30/2019 1039 Pathology     H. Pylori  no    Assessment:  Intra-procedure medications   Anesthesia gave intra-procedure sedation and medications, see anesthesia flow sheet yes    Intravenous fluids: NS@ KVO     Vital signs stable yes    Abdominal assessment: round and soft  yes    Recommendation:  Discharge patient per MD order yes.     Family or Severa Gale, daughter  Permission to share finding with family or friend yes

## 2019-07-30 NOTE — PERIOP NOTES
Endoscope was pre-cleaned at the bedside immediately following procedure by [facility]

## 2019-07-30 NOTE — DISCHARGE INSTRUCTIONS
Florida Blackwell  [id number]  1940    EGD/COLON DISCHARGE INSTRUCTIONS  Discomfort:  Redness at IV site- apply warm compress to area; if redness or soreness persist- contact your physician  There may be a slight amount of blood passed from the rectum  Gaseous discomfort- walking, belching will help relieve any discomfort  You may not operate a vehicle for 12 hours  You may not engage in an occupation involving machinery or appliances for rest of today  You may not drink alcoholic beverages for at least 12 hours  Avoid making any critical decisions for at least 24 hour  DIET:   High fiber diet. - however -  remember your colon is empty and a heavy meal will produce gas. Avoid these foods:  vegetables, fried / greasy foods, carbonated drinks for today  MEDICATION:         ACTIVITY:  You may not resume your normal daily activities until tomorrow AM; it is recommended that you spend the remainder of the day resting -  avoid any strenuous activity. CALL M.D.   ANY SIGN OF:   Increasing pain, nausea, vomiting  Abdominal distension (swelling)  New increased bleeding (oral or rectal)  Fever (chills)  Pain in chest area  Bloody discharge from nose or mouth  Shortness of breath    IMPRESSION:  -Normal esophageal mucosa; biopsied to exclude active esophagitis  -Mild stomach erythema (redness); biopsied to exclude gastritis  -Normal duodenum  -Normal terminal ileum  -Scattered diverticulosis seen throughout the colon  -Single diminutive, benign-appearing 3mm sessile polyp in the sigmoid colon at 40cm; removed with cold snare  -Small grade 1 internal hemorrhoids    Follow-up Instructions:   Call Dr. Dora Bailon for the results of procedure / biopsy in 7-10 days  Telephone # [phone]  If polyp is determined to be an adenoma, would repeat colonoscopy in 5 years    Sadie Prajapati MD

## 2019-07-30 NOTE — ANESTHESIA PREPROCEDURE EVALUATION
Relevant Problems   No relevant active problems       Anesthetic History               Review of Systems / Medical History  Patient summary reviewed, nursing notes reviewed and pertinent labs reviewed    Pulmonary    COPD      Smoker (39 pk yr smoker, quit in 2013)  Asthma     Comments: 3 Liters oxygen at home RTC   Neuro/Psych         Psychiatric history (depression)     Cardiovascular    Hypertension          Past MI (NSTEMI followiing PCI), CAD, cardiac stents (x 4 in 2013) and hyperlipidemia    Exercise tolerance: <4 METS  Comments: 6-21-19 EKG:  NSR, nonspec ST and T wave    11-14-16 ECHO: 70% EF    Arrived in wheelchair for procedure, uses rollator at home   GI/Hepatic/Renal     GERD    Renal disease: stones      Comments: Heme pos stool Endo/Other    Diabetes: using insulin    Obesity, arthritis and cancer (skin)     Other Findings   Comments: DDD back    Vit D defic         Physical Exam    Airway  Mallampati: III  TM Distance: 4 - 6 cm  Neck ROM: normal range of motion   Mouth opening: Normal     Cardiovascular  Regular rate and rhythm,  S1 and S2 normal,  no murmur, click, rub, or gallop             Dental    Dentition: Bridges  Comments: Chipped lower teeth, none loose   Pulmonary      Decreased breath sounds: bilateral           Abdominal  GI exam deferred       Other Findings            Anesthetic Plan    ASA: 3  Anesthesia type: total IV anesthesia          Induction: Intravenous  Anesthetic plan and risks discussed with: Patient

## 2019-11-16 ENCOUNTER — HOSPITAL ENCOUNTER (INPATIENT)
Age: 79
LOS: 9 days | Discharge: NURSING, REHAB OR LONG-TERM CARE | DRG: 418 | End: 2019-11-25
Attending: EMERGENCY MEDICINE | Admitting: STUDENT IN AN ORGANIZED HEALTH CARE EDUCATION/TRAINING PROGRAM
Payer: MEDICARE

## 2019-11-16 ENCOUNTER — APPOINTMENT (OUTPATIENT)
Dept: GENERAL RADIOLOGY | Age: 79
DRG: 418 | End: 2019-11-16
Attending: EMERGENCY MEDICINE
Payer: MEDICARE

## 2019-11-16 ENCOUNTER — APPOINTMENT (OUTPATIENT)
Dept: CT IMAGING | Age: 79
DRG: 418 | End: 2019-11-16
Attending: EMERGENCY MEDICINE
Payer: MEDICARE

## 2019-11-16 DIAGNOSIS — J40 BRONCHITIS: ICD-10-CM

## 2019-11-16 DIAGNOSIS — K80.41 CALCULUS OF BILE DUCT WITH CHOLECYSTITIS AND OBSTRUCTION, UNSPECIFIED CHOLECYSTITIS ACUITY: Primary | ICD-10-CM

## 2019-11-16 DIAGNOSIS — K83.8 DILATION OF COMMON BILE DUCT: ICD-10-CM

## 2019-11-16 LAB
ALBUMIN SERPL-MCNC: 3.8 G/DL (ref 3.5–5)
ALBUMIN/GLOB SERPL: 1.4 {RATIO} (ref 1.1–2.2)
ALP SERPL-CCNC: 76 U/L (ref 45–117)
ALT SERPL-CCNC: 20 U/L (ref 12–78)
ANION GAP SERPL CALC-SCNC: 2 MMOL/L (ref 5–15)
APPEARANCE UR: ABNORMAL
AST SERPL-CCNC: 14 U/L (ref 15–37)
BACTERIA URNS QL MICRO: NEGATIVE /HPF
BASOPHILS # BLD: 0 K/UL (ref 0–0.1)
BASOPHILS NFR BLD: 0 % (ref 0–1)
BILIRUB SERPL-MCNC: 0.4 MG/DL (ref 0.2–1)
BILIRUB UR QL: NEGATIVE
BUN SERPL-MCNC: 16 MG/DL (ref 6–20)
BUN/CREAT SERPL: 14 (ref 12–20)
CALCIUM SERPL-MCNC: 10.1 MG/DL (ref 8.5–10.1)
CHLORIDE SERPL-SCNC: 104 MMOL/L (ref 97–108)
CO2 SERPL-SCNC: 34 MMOL/L (ref 21–32)
COLOR UR: ABNORMAL
CREAT SERPL-MCNC: 1.13 MG/DL (ref 0.55–1.02)
DIFFERENTIAL METHOD BLD: ABNORMAL
EOSINOPHIL # BLD: 0.1 K/UL (ref 0–0.4)
EOSINOPHIL NFR BLD: 2 % (ref 0–7)
EPITH CASTS URNS QL MICRO: ABNORMAL /LPF
ERYTHROCYTE [DISTWIDTH] IN BLOOD BY AUTOMATED COUNT: 13.3 % (ref 11.5–14.5)
GLOBULIN SER CALC-MCNC: 2.7 G/DL (ref 2–4)
GLUCOSE SERPL-MCNC: 204 MG/DL (ref 65–100)
GLUCOSE UR STRIP.AUTO-MCNC: 100 MG/DL
HCT VFR BLD AUTO: 37.2 % (ref 35–47)
HGB BLD-MCNC: 12.2 G/DL (ref 11.5–16)
HGB UR QL STRIP: NEGATIVE
HYALINE CASTS URNS QL MICRO: ABNORMAL /LPF (ref 0–5)
IMM GRANULOCYTES # BLD AUTO: 0 K/UL (ref 0–0.04)
IMM GRANULOCYTES NFR BLD AUTO: 1 % (ref 0–0.5)
KETONES UR QL STRIP.AUTO: NEGATIVE MG/DL
LEUKOCYTE ESTERASE UR QL STRIP.AUTO: NEGATIVE
LIPASE SERPL-CCNC: 153 U/L (ref 73–393)
LYMPHOCYTES # BLD: 1 K/UL (ref 0.8–3.5)
LYMPHOCYTES NFR BLD: 17 % (ref 12–49)
MAGNESIUM SERPL-MCNC: 1.3 MG/DL (ref 1.6–2.4)
MCH RBC QN AUTO: 30.5 PG (ref 26–34)
MCHC RBC AUTO-ENTMCNC: 32.8 G/DL (ref 30–36.5)
MCV RBC AUTO: 93 FL (ref 80–99)
MONOCYTES # BLD: 0.5 K/UL (ref 0–1)
MONOCYTES NFR BLD: 9 % (ref 5–13)
NEUTS SEG # BLD: 4.2 K/UL (ref 1.8–8)
NEUTS SEG NFR BLD: 71 % (ref 32–75)
NITRITE UR QL STRIP.AUTO: NEGATIVE
NRBC # BLD: 0 K/UL (ref 0–0.01)
NRBC BLD-RTO: 0 PER 100 WBC
PH UR STRIP: 7.5 [PH] (ref 5–8)
PLATELET # BLD AUTO: 197 K/UL (ref 150–400)
PMV BLD AUTO: 9.5 FL (ref 8.9–12.9)
POTASSIUM SERPL-SCNC: 3.9 MMOL/L (ref 3.5–5.1)
PROT SERPL-MCNC: 6.5 G/DL (ref 6.4–8.2)
PROT UR STRIP-MCNC: NEGATIVE MG/DL
RBC # BLD AUTO: 4 M/UL (ref 3.8–5.2)
RBC #/AREA URNS HPF: ABNORMAL /HPF (ref 0–5)
SODIUM SERPL-SCNC: 140 MMOL/L (ref 136–145)
SP GR UR REFRACTOMETRY: 1.01 (ref 1–1.03)
UR CULT HOLD, URHOLD: NORMAL
UROBILINOGEN UR QL STRIP.AUTO: 1 EU/DL (ref 0.2–1)
WBC # BLD AUTO: 5.9 K/UL (ref 3.6–11)
WBC URNS QL MICRO: ABNORMAL /HPF (ref 0–4)

## 2019-11-16 PROCEDURE — 74011636320 HC RX REV CODE- 636/320: Performed by: RADIOLOGY

## 2019-11-16 PROCEDURE — 80053 COMPREHEN METABOLIC PANEL: CPT

## 2019-11-16 PROCEDURE — 74011250636 HC RX REV CODE- 250/636: Performed by: INTERNAL MEDICINE

## 2019-11-16 PROCEDURE — 74177 CT ABD & PELVIS W/CONTRAST: CPT

## 2019-11-16 PROCEDURE — 74011000258 HC RX REV CODE- 258: Performed by: EMERGENCY MEDICINE

## 2019-11-16 PROCEDURE — 99285 EMERGENCY DEPT VISIT HI MDM: CPT

## 2019-11-16 PROCEDURE — 83690 ASSAY OF LIPASE: CPT

## 2019-11-16 PROCEDURE — 74011250636 HC RX REV CODE- 250/636: Performed by: EMERGENCY MEDICINE

## 2019-11-16 PROCEDURE — 36415 COLL VENOUS BLD VENIPUNCTURE: CPT

## 2019-11-16 PROCEDURE — 74011000258 HC RX REV CODE- 258: Performed by: RADIOLOGY

## 2019-11-16 PROCEDURE — 81001 URINALYSIS AUTO W/SCOPE: CPT

## 2019-11-16 PROCEDURE — 65270000032 HC RM SEMIPRIVATE

## 2019-11-16 PROCEDURE — 83735 ASSAY OF MAGNESIUM: CPT

## 2019-11-16 PROCEDURE — 71046 X-RAY EXAM CHEST 2 VIEWS: CPT

## 2019-11-16 PROCEDURE — 96375 TX/PRO/DX INJ NEW DRUG ADDON: CPT

## 2019-11-16 PROCEDURE — 96376 TX/PRO/DX INJ SAME DRUG ADON: CPT

## 2019-11-16 PROCEDURE — 94761 N-INVAS EAR/PLS OXIMETRY MLT: CPT

## 2019-11-16 PROCEDURE — 85025 COMPLETE CBC W/AUTO DIFF WBC: CPT

## 2019-11-16 PROCEDURE — 74011250636 HC RX REV CODE- 250/636: Performed by: STUDENT IN AN ORGANIZED HEALTH CARE EDUCATION/TRAINING PROGRAM

## 2019-11-16 PROCEDURE — 96374 THER/PROPH/DIAG INJ IV PUSH: CPT

## 2019-11-16 RX ORDER — CARVEDILOL 6.25 MG/1
6.25 TABLET ORAL 2 TIMES DAILY WITH MEALS
Status: DISCONTINUED | OUTPATIENT
Start: 2019-11-17 | End: 2019-11-25 | Stop reason: HOSPADM

## 2019-11-16 RX ORDER — PRAVASTATIN SODIUM 40 MG/1
40 TABLET ORAL
Status: DISCONTINUED | OUTPATIENT
Start: 2019-11-16 | End: 2019-11-25 | Stop reason: HOSPADM

## 2019-11-16 RX ORDER — SODIUM CHLORIDE 0.9 % (FLUSH) 0.9 %
5-40 SYRINGE (ML) INJECTION AS NEEDED
Status: DISCONTINUED | OUTPATIENT
Start: 2019-11-16 | End: 2019-11-25 | Stop reason: HOSPADM

## 2019-11-16 RX ORDER — FLUTICASONE PROPIONATE AND SALMETEROL 100; 50 UG/1; UG/1
1 POWDER RESPIRATORY (INHALATION) EVERY 12 HOURS
Status: DISCONTINUED | OUTPATIENT
Start: 2019-11-16 | End: 2019-11-16 | Stop reason: CLARIF

## 2019-11-16 RX ORDER — BUDESONIDE 0.5 MG/2ML
500 INHALANT ORAL
Status: DISCONTINUED | OUTPATIENT
Start: 2019-11-16 | End: 2019-11-25 | Stop reason: HOSPADM

## 2019-11-16 RX ORDER — MAGNESIUM SULFATE 100 %
4 CRYSTALS MISCELLANEOUS AS NEEDED
Status: DISCONTINUED | OUTPATIENT
Start: 2019-11-16 | End: 2019-11-25 | Stop reason: HOSPADM

## 2019-11-16 RX ORDER — GUAIFENESIN 100 MG/5ML
81 LIQUID (ML) ORAL DAILY
Status: DISCONTINUED | OUTPATIENT
Start: 2019-11-17 | End: 2019-11-25 | Stop reason: HOSPADM

## 2019-11-16 RX ORDER — HYDRALAZINE HYDROCHLORIDE 20 MG/ML
20 INJECTION INTRAMUSCULAR; INTRAVENOUS
Status: DISPENSED | OUTPATIENT
Start: 2019-11-16 | End: 2019-11-17

## 2019-11-16 RX ORDER — EZETIMIBE 10 MG/1
10 TABLET ORAL DAILY
Status: DISCONTINUED | OUTPATIENT
Start: 2019-11-17 | End: 2019-11-25 | Stop reason: HOSPADM

## 2019-11-16 RX ORDER — DULOXETIN HYDROCHLORIDE 60 MG/1
60 CAPSULE, DELAYED RELEASE ORAL DAILY
Status: DISCONTINUED | OUTPATIENT
Start: 2019-11-17 | End: 2019-11-25 | Stop reason: HOSPADM

## 2019-11-16 RX ORDER — FENTANYL CITRATE 50 UG/ML
100 INJECTION, SOLUTION INTRAMUSCULAR; INTRAVENOUS
Status: COMPLETED | OUTPATIENT
Start: 2019-11-16 | End: 2019-11-16

## 2019-11-16 RX ORDER — SODIUM CHLORIDE 0.9 % (FLUSH) 0.9 %
5-40 SYRINGE (ML) INJECTION EVERY 8 HOURS
Status: DISCONTINUED | OUTPATIENT
Start: 2019-11-16 | End: 2019-11-25 | Stop reason: HOSPADM

## 2019-11-16 RX ORDER — HYDRALAZINE HYDROCHLORIDE 20 MG/ML
20 INJECTION INTRAMUSCULAR; INTRAVENOUS
Status: DISCONTINUED | OUTPATIENT
Start: 2019-11-16 | End: 2019-11-25 | Stop reason: HOSPADM

## 2019-11-16 RX ORDER — DEXTROSE MONOHYDRATE 100 MG/ML
0-250 INJECTION, SOLUTION INTRAVENOUS AS NEEDED
Status: DISCONTINUED | OUTPATIENT
Start: 2019-11-16 | End: 2019-11-25 | Stop reason: HOSPADM

## 2019-11-16 RX ORDER — LISINOPRIL 5 MG/1
5 TABLET ORAL DAILY
Status: DISCONTINUED | OUTPATIENT
Start: 2019-11-17 | End: 2019-11-25 | Stop reason: HOSPADM

## 2019-11-16 RX ORDER — SODIUM CHLORIDE 0.9 % (FLUSH) 0.9 %
10 SYRINGE (ML) INJECTION
Status: COMPLETED | OUTPATIENT
Start: 2019-11-16 | End: 2019-11-16

## 2019-11-16 RX ORDER — ARFORMOTEROL TARTRATE 15 UG/2ML
15 SOLUTION RESPIRATORY (INHALATION)
Status: DISCONTINUED | OUTPATIENT
Start: 2019-11-16 | End: 2019-11-25 | Stop reason: HOSPADM

## 2019-11-16 RX ORDER — ONDANSETRON 2 MG/ML
4 INJECTION INTRAMUSCULAR; INTRAVENOUS
Status: DISCONTINUED | OUTPATIENT
Start: 2019-11-16 | End: 2019-11-25 | Stop reason: HOSPADM

## 2019-11-16 RX ORDER — FENTANYL CITRATE 50 UG/ML
50 INJECTION, SOLUTION INTRAMUSCULAR; INTRAVENOUS
Status: COMPLETED | OUTPATIENT
Start: 2019-11-16 | End: 2019-11-16

## 2019-11-16 RX ORDER — KETOROLAC TROMETHAMINE 30 MG/ML
15 INJECTION, SOLUTION INTRAMUSCULAR; INTRAVENOUS
Status: COMPLETED | OUTPATIENT
Start: 2019-11-16 | End: 2019-11-16

## 2019-11-16 RX ORDER — ONDANSETRON 2 MG/ML
4 INJECTION INTRAMUSCULAR; INTRAVENOUS
Status: COMPLETED | OUTPATIENT
Start: 2019-11-16 | End: 2019-11-16

## 2019-11-16 RX ORDER — THERA TABS 400 MCG
1 TAB ORAL DAILY
Status: DISCONTINUED | OUTPATIENT
Start: 2019-11-17 | End: 2019-11-25 | Stop reason: HOSPADM

## 2019-11-16 RX ORDER — INSULIN GLARGINE 100 [IU]/ML
30 INJECTION, SOLUTION SUBCUTANEOUS DAILY
Status: DISCONTINUED | OUTPATIENT
Start: 2019-11-17 | End: 2019-11-22

## 2019-11-16 RX ORDER — MORPHINE SULFATE 2 MG/ML
2 INJECTION, SOLUTION INTRAMUSCULAR; INTRAVENOUS
Status: DISCONTINUED | OUTPATIENT
Start: 2019-11-16 | End: 2019-11-17

## 2019-11-16 RX ORDER — LORATADINE 10 MG/1
10 TABLET ORAL
Status: DISCONTINUED | OUTPATIENT
Start: 2019-11-16 | End: 2019-11-25 | Stop reason: HOSPADM

## 2019-11-16 RX ORDER — FENOFIBRATE 145 MG/1
160 TABLET, COATED ORAL DAILY
Status: DISCONTINUED | OUTPATIENT
Start: 2019-11-17 | End: 2019-11-25 | Stop reason: HOSPADM

## 2019-11-16 RX ADMIN — FENTANYL CITRATE 50 MCG: 50 INJECTION, SOLUTION INTRAMUSCULAR; INTRAVENOUS at 18:13

## 2019-11-16 RX ADMIN — ONDANSETRON 4 MG: 2 INJECTION INTRAMUSCULAR; INTRAVENOUS at 21:51

## 2019-11-16 RX ADMIN — MORPHINE SULFATE 2 MG: 2 INJECTION, SOLUTION INTRAMUSCULAR; INTRAVENOUS at 21:51

## 2019-11-16 RX ADMIN — Medication 10 ML: at 19:29

## 2019-11-16 RX ADMIN — IOPAMIDOL 100 ML: 755 INJECTION, SOLUTION INTRAVENOUS at 19:29

## 2019-11-16 RX ADMIN — KETOROLAC TROMETHAMINE 15 MG: 30 INJECTION, SOLUTION INTRAMUSCULAR at 22:56

## 2019-11-16 RX ADMIN — ONDANSETRON 4 MG: 2 INJECTION INTRAMUSCULAR; INTRAVENOUS at 18:13

## 2019-11-16 RX ADMIN — FENTANYL CITRATE 100 MCG: 50 INJECTION, SOLUTION INTRAMUSCULAR; INTRAVENOUS at 19:00

## 2019-11-16 RX ADMIN — FENTANYL CITRATE 50 MCG: 50 INJECTION, SOLUTION INTRAMUSCULAR; INTRAVENOUS at 20:50

## 2019-11-16 RX ADMIN — PIPERACILLIN SODIUM AND TAZOBACTAM SODIUM 3.38 G: 3; .375 INJECTION, POWDER, LYOPHILIZED, FOR SOLUTION INTRAVENOUS at 20:53

## 2019-11-16 RX ADMIN — SODIUM CHLORIDE 10 ML: 9 INJECTION INTRAMUSCULAR; INTRAVENOUS; SUBCUTANEOUS at 22:00

## 2019-11-16 RX ADMIN — SODIUM CHLORIDE 100 ML: 900 INJECTION, SOLUTION INTRAVENOUS at 19:29

## 2019-11-16 RX ADMIN — SODIUM CHLORIDE 1000 ML: 900 INJECTION, SOLUTION INTRAVENOUS at 18:13

## 2019-11-16 NOTE — ED PROVIDER NOTES
HPI .  Patient has a history of obesity, chronic pain, sleep disorder, diabetes, hypertension, hyperlipidemia, kidney stone, myocardial infarction, coronary stent, depression, appendectomy, and possibly an abdominal surgery for ureteral stenosis. Patient reports feeling bad for several months. She was found to have Hemoccult positive stool. She says she had black tarry stool at one point and \"esophageal bleed. \"  However review of recent endoscopy/colonoscopy did not show any esophageal mucosal abnormality. She may have had slight gastritis. Patient presents with a week of intermittent epigastric pain.   today she has had constant upper abdominal pain that radiates to her right flank. She reports vomiting. She does not complain of black tarry stools. She says that she saw a single streak of red possibly bloody material in her vomit.     Past Medical History:   Diagnosis Date    Anemia NEC     Arthritis     Asthma     CAD (coronary artery disease)     NSTEMI following PCI    Calculus of kidney 7-    [facility]    Cancer ([facility] [address]     skin    Chronic pain     degenerative disc disease, lower right back    COPD     Depression     Diabetes ([address]     GERD (gastroesophageal reflux disease)     Hx of mammogram 11/29/2017    [address] [facility] - No mammographic evidence of malignancy - annual follow up recommended     Hypertension     2005 Dx    Joint pain     Morbid obesity ([address]     Psychiatric disorder     depression    Sleep disorder        Past Surgical History:   Procedure Laterality Date    CARDIAC SURG PROCEDURE UNLIST      stents    COLONOSCOPY N/A 7/30/2019    COLONOSCOPY performed by Chao Mtz MD at [address] [place] [address]  7/30/2019         HX APPENDECTOMY      in 30's    HX CORONARY STENT PLACEMENT  dec 2013    HX HYSTERECTOMY      HX LITHOTRIPSY      HX TRABECULECTOMY      HX TUBAL LIGATION      UPPER GI ENDOSCOPY,BIOPSY  7/30/2019              Family History:   Problem Relation Age of Onset    Arthritis-rheumatoid Sister     Osteoporosis Sister     Diabetes Brother     Stroke Brother     Elevated Lipids Mother     Alcohol abuse Father     Elevated Lipids Father     Cancer Sister     Diabetes Sister     Cancer Sister     Diabetes Brother     Diabetes Brother     Heart Disease Brother     Diabetes Brother     Diabetes Brother     Heart Disease Brother     Diabetes Maternal Aunt        Social History     Socioeconomic History    Marital status:      Spouse name: Not on file    Number of children: Not on file    Years of education: Not on file    Highest education level: Not on file   Occupational History    Not on file   Social Needs    Financial resource strain: Not on file    Food insecurity:     Worry: Not on file     Inability: Not on file    Transportation needs:     Medical: Not on file     Non-medical: Not on file   Tobacco Use    Smoking status: Former Smoker     Packs/day: 1.00     Years: 45.00     Pack years: 45.00     Last attempt to quit: 2013     Years since quittin.8    Smokeless tobacco: Never Used   Substance and Sexual Activity    Alcohol use: No    Drug use: No    Sexual activity: Never   Lifestyle    Physical activity:     Days per week: Not on file     Minutes per session: Not on file    Stress: Not on file   Relationships    Social connections:     Talks on phone: Not on file     Gets together: Not on file     Attends Islam service: Not on file     Active member of club or organization: Not on file     Attends meetings of clubs or organizations: Not on file     Relationship status: Not on file    Intimate partner violence:     Fear of current or ex partner: Not on file     Emotionally abused: Not on file     Physically abused: Not on file     Forced sexual activity: Not on file   Other Topics Concern    Not on file   Social History Narrative    Not on file         ALLERGIES: Codeine; Crestor [rosuvastatin]; Lipitor [atorvastatin]; and Tetanus vaccines and toxoid    Review of Systems   HENT: Positive for congestion. Respiratory: Negative for chest tightness and shortness of breath. Cardiovascular: Negative for chest pain, palpitations and leg swelling. Gastrointestinal: Positive for abdominal pain and vomiting. Negative for abdominal distention. Genitourinary: Negative for decreased urine volume and difficulty urinating. Musculoskeletal: Negative for neck stiffness. Neurological: Negative for speech difficulty, weakness and headaches. Psychiatric/Behavioral: Negative for agitation and behavioral problems. The patient is nervous/anxious. Vitals:    11/16/19 1748   BP: (!) 152/117   Pulse: 76   Resp: 18   Temp: 97.7 °F (36.5 °C)   SpO2: 100%   Weight: 90.7 kg (200 lb)   Height: 5' 4\" (1.626 m)            Physical Exam   Constitutional: She appears well-developed and well-nourished. HENT:   Head: Normocephalic and atraumatic. Mouth/Throat: Oropharynx is clear and moist.   Eyes: Pupils are equal, round, and reactive to light. EOM are normal.   Neck: Normal range of motion. Neck supple. Cardiovascular: Normal rate, regular rhythm, normal heart sounds and intact distal pulses. Exam reveals no gallop and no friction rub. No murmur heard. Pulmonary/Chest: Effort normal. No respiratory distress. She has no wheezes. She has no rales. Abdominal: Soft. There is tenderness. There is no rebound. Bilateral upper quadrant tenderness;   Musculoskeletal: Normal range of motion. She exhibits no tenderness. Neurological: She is alert. No cranial nerve deficit. Motor; symmetric   Skin: No erythema. Psychiatric: She has a normal mood and affect. Her behavior is normal.   Nursing note and vitals reviewed.        MDM       Procedures          Hospitalist Geo Text for Admission  8:37 PM    ED Room Number: NB55/23  Patient Name and age:  France Hampton 70 y.o.  female  Working Diagnosis:   1.  Calculus of bile duct with cholecystitis and obstruction, unspecified cholecystitis acuity      Readmission: no  Isolation Requirements:  no  Recommended Level of Care:  med/surg  Code Status:  FULL  Other:    Department:[facility] Adult ED - 21

## 2019-11-16 NOTE — ED TRIAGE NOTES
Pt arrived via EMS from home c/o abdominal pain with accompanying n/v x1 day. Pt given zofran en route. Pt has hx of esophageal bleed, DM, and COPD.

## 2019-11-17 LAB
ALBUMIN SERPL-MCNC: 3.4 G/DL (ref 3.5–5)
ALBUMIN/GLOB SERPL: 1.2 {RATIO} (ref 1.1–2.2)
ALP SERPL-CCNC: 65 U/L (ref 45–117)
ALT SERPL-CCNC: 96 U/L (ref 12–78)
AMYLASE SERPL-CCNC: 32 U/L (ref 25–115)
ANION GAP SERPL CALC-SCNC: 6 MMOL/L (ref 5–15)
AST SERPL-CCNC: 91 U/L (ref 15–37)
BILIRUB DIRECT SERPL-MCNC: 0.3 MG/DL (ref 0–0.2)
BILIRUB SERPL-MCNC: 0.7 MG/DL (ref 0.2–1)
BUN SERPL-MCNC: 17 MG/DL (ref 6–20)
BUN/CREAT SERPL: 14 (ref 12–20)
CALCIUM SERPL-MCNC: 9.8 MG/DL (ref 8.5–10.1)
CHLORIDE SERPL-SCNC: 104 MMOL/L (ref 97–108)
CO2 SERPL-SCNC: 29 MMOL/L (ref 21–32)
CREAT SERPL-MCNC: 1.18 MG/DL (ref 0.55–1.02)
ERYTHROCYTE [DISTWIDTH] IN BLOOD BY AUTOMATED COUNT: 13.2 % (ref 11.5–14.5)
GLOBULIN SER CALC-MCNC: 2.9 G/DL (ref 2–4)
GLUCOSE BLD STRIP.AUTO-MCNC: 158 MG/DL (ref 65–100)
GLUCOSE BLD STRIP.AUTO-MCNC: 164 MG/DL (ref 65–100)
GLUCOSE BLD STRIP.AUTO-MCNC: 201 MG/DL (ref 65–100)
GLUCOSE BLD STRIP.AUTO-MCNC: 214 MG/DL (ref 65–100)
GLUCOSE BLD STRIP.AUTO-MCNC: 223 MG/DL (ref 65–100)
GLUCOSE SERPL-MCNC: 180 MG/DL (ref 65–100)
HCT VFR BLD AUTO: 38.7 % (ref 35–47)
HGB BLD-MCNC: 12.8 G/DL (ref 11.5–16)
LIPASE SERPL-CCNC: 90 U/L (ref 73–393)
MCH RBC QN AUTO: 30.5 PG (ref 26–34)
MCHC RBC AUTO-ENTMCNC: 33.1 G/DL (ref 30–36.5)
MCV RBC AUTO: 92.4 FL (ref 80–99)
NRBC # BLD: 0 K/UL (ref 0–0.01)
NRBC BLD-RTO: 0 PER 100 WBC
PLATELET # BLD AUTO: 186 K/UL (ref 150–400)
PMV BLD AUTO: 9.8 FL (ref 8.9–12.9)
POTASSIUM SERPL-SCNC: 3.6 MMOL/L (ref 3.5–5.1)
PROT SERPL-MCNC: 6.3 G/DL (ref 6.4–8.2)
RBC # BLD AUTO: 4.19 M/UL (ref 3.8–5.2)
SERVICE CMNT-IMP: ABNORMAL
SODIUM SERPL-SCNC: 139 MMOL/L (ref 136–145)
WBC # BLD AUTO: 13.8 K/UL (ref 3.6–11)

## 2019-11-17 PROCEDURE — 74011250636 HC RX REV CODE- 250/636: Performed by: STUDENT IN AN ORGANIZED HEALTH CARE EDUCATION/TRAINING PROGRAM

## 2019-11-17 PROCEDURE — 80076 HEPATIC FUNCTION PANEL: CPT

## 2019-11-17 PROCEDURE — 74011000250 HC RX REV CODE- 250: Performed by: STUDENT IN AN ORGANIZED HEALTH CARE EDUCATION/TRAINING PROGRAM

## 2019-11-17 PROCEDURE — 94640 AIRWAY INHALATION TREATMENT: CPT

## 2019-11-17 PROCEDURE — 65270000032 HC RM SEMIPRIVATE

## 2019-11-17 PROCEDURE — 82150 ASSAY OF AMYLASE: CPT

## 2019-11-17 PROCEDURE — 80048 BASIC METABOLIC PNL TOTAL CA: CPT

## 2019-11-17 PROCEDURE — 85027 COMPLETE CBC AUTOMATED: CPT

## 2019-11-17 PROCEDURE — 83690 ASSAY OF LIPASE: CPT

## 2019-11-17 PROCEDURE — 36415 COLL VENOUS BLD VENIPUNCTURE: CPT

## 2019-11-17 PROCEDURE — 77030018846 HC SOL IRR STRL H20 ICUM -A

## 2019-11-17 PROCEDURE — 74011000258 HC RX REV CODE- 258: Performed by: INTERNAL MEDICINE

## 2019-11-17 PROCEDURE — 74011636637 HC RX REV CODE- 636/637: Performed by: STUDENT IN AN ORGANIZED HEALTH CARE EDUCATION/TRAINING PROGRAM

## 2019-11-17 PROCEDURE — 74011250636 HC RX REV CODE- 250/636: Performed by: INTERNAL MEDICINE

## 2019-11-17 PROCEDURE — 82962 GLUCOSE BLOOD TEST: CPT

## 2019-11-17 RX ORDER — KETOROLAC TROMETHAMINE 30 MG/ML
15 INJECTION, SOLUTION INTRAMUSCULAR; INTRAVENOUS EVERY 6 HOURS
Status: DISCONTINUED | OUTPATIENT
Start: 2019-11-17 | End: 2019-11-17

## 2019-11-17 RX ORDER — MORPHINE SULFATE 2 MG/ML
2 INJECTION, SOLUTION INTRAMUSCULAR; INTRAVENOUS
Status: DISCONTINUED | OUTPATIENT
Start: 2019-11-17 | End: 2019-11-19

## 2019-11-17 RX ORDER — DEXTROSE, SODIUM CHLORIDE, AND POTASSIUM CHLORIDE 5; .9; .15 G/100ML; G/100ML; G/100ML
100 INJECTION INTRAVENOUS CONTINUOUS
Status: DISCONTINUED | OUTPATIENT
Start: 2019-11-17 | End: 2019-11-18

## 2019-11-17 RX ORDER — KETOROLAC TROMETHAMINE 30 MG/ML
15 INJECTION, SOLUTION INTRAMUSCULAR; INTRAVENOUS
Status: DISCONTINUED | OUTPATIENT
Start: 2019-11-17 | End: 2019-11-17

## 2019-11-17 RX ADMIN — MORPHINE SULFATE 2 MG: 2 INJECTION, SOLUTION INTRAMUSCULAR; INTRAVENOUS at 21:11

## 2019-11-17 RX ADMIN — SODIUM CHLORIDE 10 ML: 9 INJECTION INTRAMUSCULAR; INTRAVENOUS; SUBCUTANEOUS at 06:00

## 2019-11-17 RX ADMIN — POTASSIUM CHLORIDE, DEXTROSE MONOHYDRATE AND SODIUM CHLORIDE 100 ML/HR: 150; 5; 900 INJECTION, SOLUTION INTRAVENOUS at 11:07

## 2019-11-17 RX ADMIN — ONDANSETRON 4 MG: 2 INJECTION INTRAMUSCULAR; INTRAVENOUS at 09:04

## 2019-11-17 RX ADMIN — MORPHINE SULFATE 2 MG: 2 INJECTION, SOLUTION INTRAMUSCULAR; INTRAVENOUS at 01:54

## 2019-11-17 RX ADMIN — ONDANSETRON 4 MG: 2 INJECTION INTRAMUSCULAR; INTRAVENOUS at 23:18

## 2019-11-17 RX ADMIN — MORPHINE SULFATE 2 MG: 2 INJECTION, SOLUTION INTRAMUSCULAR; INTRAVENOUS at 09:04

## 2019-11-17 RX ADMIN — PIPERACILLIN AND TAZOBACTAM 3.38 G: 3; .375 INJECTION, POWDER, FOR SOLUTION INTRAVENOUS at 21:21

## 2019-11-17 RX ADMIN — HUMAN INSULIN 2 UNITS: 100 INJECTION, SOLUTION SUBCUTANEOUS at 00:23

## 2019-11-17 RX ADMIN — MORPHINE SULFATE 2 MG: 2 INJECTION, SOLUTION INTRAMUSCULAR; INTRAVENOUS at 15:16

## 2019-11-17 RX ADMIN — SODIUM CHLORIDE 10 ML: 9 INJECTION INTRAMUSCULAR; INTRAVENOUS; SUBCUTANEOUS at 21:11

## 2019-11-17 RX ADMIN — HUMAN INSULIN 2 UNITS: 100 INJECTION, SOLUTION SUBCUTANEOUS at 07:01

## 2019-11-17 RX ADMIN — MORPHINE SULFATE 2 MG: 2 INJECTION, SOLUTION INTRAMUSCULAR; INTRAVENOUS at 05:56

## 2019-11-17 RX ADMIN — HUMAN INSULIN 2 UNITS: 100 INJECTION, SOLUTION SUBCUTANEOUS at 22:08

## 2019-11-17 RX ADMIN — PIPERACILLIN AND TAZOBACTAM 3.38 G: 3; .375 INJECTION, POWDER, FOR SOLUTION INTRAVENOUS at 13:07

## 2019-11-17 RX ADMIN — MORPHINE SULFATE 2 MG: 2 INJECTION, SOLUTION INTRAMUSCULAR; INTRAVENOUS at 18:37

## 2019-11-17 RX ADMIN — Medication 10 ML: at 18:37

## 2019-11-17 RX ADMIN — SODIUM CHLORIDE 500 ML: 900 INJECTION, SOLUTION INTRAVENOUS at 11:07

## 2019-11-17 RX ADMIN — INSULIN GLARGINE 30 UNITS: 100 INJECTION, SOLUTION SUBCUTANEOUS at 11:07

## 2019-11-17 RX ADMIN — MORPHINE SULFATE 2 MG: 2 INJECTION, SOLUTION INTRAMUSCULAR; INTRAVENOUS at 13:20

## 2019-11-17 RX ADMIN — MORPHINE SULFATE 2 MG: 2 INJECTION, SOLUTION INTRAMUSCULAR; INTRAVENOUS at 23:18

## 2019-11-17 RX ADMIN — ONDANSETRON 4 MG: 2 INJECTION INTRAMUSCULAR; INTRAVENOUS at 05:56

## 2019-11-17 RX ADMIN — POTASSIUM CHLORIDE, DEXTROSE MONOHYDRATE AND SODIUM CHLORIDE 100 ML/HR: 150; 5; 900 INJECTION, SOLUTION INTRAVENOUS at 22:08

## 2019-11-17 RX ADMIN — ONDANSETRON 4 MG: 2 INJECTION INTRAMUSCULAR; INTRAVENOUS at 13:20

## 2019-11-17 RX ADMIN — SODIUM CHLORIDE 10 ML: 9 INJECTION INTRAMUSCULAR; INTRAVENOUS; SUBCUTANEOUS at 13:21

## 2019-11-17 RX ADMIN — ONDANSETRON 4 MG: 2 INJECTION INTRAMUSCULAR; INTRAVENOUS at 18:37

## 2019-11-17 RX ADMIN — HUMAN INSULIN 3 UNITS: 100 INJECTION, SOLUTION SUBCUTANEOUS at 16:14

## 2019-11-17 RX ADMIN — Medication 10 ML: at 09:05

## 2019-11-17 RX ADMIN — ONDANSETRON 4 MG: 2 INJECTION INTRAMUSCULAR; INTRAVENOUS at 01:52

## 2019-11-17 NOTE — CONSULTS
[address], [address] [place] [address]       GASTROENTEROLOGY CONSULTATION NOTE        NAME:  Tj Thomas   :   1940   MRN:   [id number]           Consult Date: 2019 12:00 PM      History of Present Illness:  Patient is a 70 y.o. who is seen in consultation at the request of Dr. Tammi Crump for common bile duct stones. She has a PMH as below. She presents with a several week history of intermittent abdominal pain. She presented to ED yesterday with a generalized ill feeling. In July, she was worked up for occult positive stool and underwent EGD and colonoscopy by Dr. Tracie Ojeda on 19. EGD showed a normal esophagus, mild gastropathy and colonoscopy showed -Normal terminal ileum  -Scattered diverticulosis seen throughout the colon  -Single diminutive, benign-appearing 3mm sessile polyp in the sigmoid colon at 40cm; removed with cold snare  -Small grade 1 internal hemorrhoids. On presentation in ED, she was found to have a normal CBC and normal LFT's and normal lipase. CT abdomen/pelvis with IV contrast showed the followin. New choledocholithiasis and CBD dilatation since . Consider ERCP. MRCP is  unlikely to yield additional information. 2. Cholelithiasis. No acute cholecystitis. 3. Bilateral renal scarring and multiple renal cysts. No CT evidence of  pyelonephritis. No hydronephrosis. Her LFT's have not resulted as yet today. Her WBC has risen to 13.8. She reports having RUQ and epigastric pain.        PMH:  Past Medical History:   Diagnosis Date    Anemia NEC     Arthritis     Asthma     CAD (coronary artery disease)     NSTEMI following PCI    Calculus of kidney 2001    [facility] [place]    Cancer ([facility] [address]     skin    Chronic pain     degenerative disc disease, lower right back    COPD     Depression     Diabetes ([facility] [address]     GERD (gastroesophageal reflux disease)     Hx of mammogram 2017    [address] [facility] - No mammographic evidence of malignancy - annual follow up recommended     Hypertension     2005 Dx    Joint pain     Morbid obesity ([address]     Psychiatric disorder     depression    Sleep disorder        PSH:  Past Surgical History:   Procedure Laterality Date    CARDIAC SURG PROCEDURE UNLIST      stents    COLONOSCOPY N/A 7/30/2019    COLONOSCOPY performed by Codie Inman MD at [facility]  7/30/2019         HX APPENDECTOMY      in 30's    HX CORONARY STENT PLACEMENT  dec 2013    HX HYSTERECTOMY      HX LITHOTRIPSY      HX TRABECULECTOMY      HX TUBAL LIGATION      UPPER GI ENDOSCOPY,BIOPSY  7/30/2019            Allergies: Allergies   Allergen Reactions    Codeine Shortness of Breath    Crestor [Rosuvastatin] Other (comments)     Leg pains and could not walk    Lipitor [Atorvastatin] Myalgia    Tetanus Vaccines And Toxoid Swelling       Home Medications:  Prior to Admission Medications   Prescriptions Last Dose Informant Patient Reported? Taking? ACCU-CHEK SHELIA PLUS TEST STRP strip   Yes No   Sig: TEST BLOOD SUAGR TWICE A DAY   DULoxetine (CYMBALTA) 60 mg capsule   No No   Sig: Take 1 capsule by mouth  every day   LANTUS SOLOSTAR U-100 INSULIN 100 unit/mL (3 mL) inpn   Yes No   Sig: INJECT 30 UNITS DAILY   albuterol (PROVENTIL HFA) 90 mcg/Actuation inhaler   Yes No   Sig: Take 2 Puffs by inhalation every four (4) hours as needed. Indications: BRONCHOSPASM PREVENTION   albuterol (PROVENTIL VENTOLIN) 2.5 mg /3 mL (0.083 %) nebulizer solution   No No   Sig: 3 mL by Nebulization route every four (4) hours as needed for Wheezing. aspirin 81 mg chewable tablet   No No   Sig: Take 1 Tab by mouth daily. carvedilol (COREG) 6.25 mg tablet   No No   Sig: Take 1 Tab by mouth two (2) times daily (with meals). co-enzyme Q-10 (CO Q-10) 50 mg capsule   Yes No   Sig: Take 50 mg by mouth daily.    ezetimibe (ZETIA) 10 mg tablet   Yes No   fenofibrate nanocrystallized (TRICOR) 145 mg tablet No No   Sig: Take 1 tablet by mouth  daily   fluticasone-salmeterol (ADVAIR DISKUS) 100-50 mcg/dose diskus inhaler   No No   Sig: Take 1 Puff by inhalation every twelve (12) hours. glimepiride (AMARYL) 2 mg tablet   No No   Sig: Take 1 Tab by mouth two (2) times a day for 15 days. glimepiride (AMARYL) 2 mg tablet   Yes No   Sig: TAKE 3 TABLETS BY MOUTH DAILY AS DIRECTED   glycopyrrolate-formoterol (BEVESPI AEROSPHERE) 9-4.8 mcg HFAA   Yes No   Sig: Take  by inhalation. lisinopril (PRINIVIL, ZESTRIL) 10 mg tablet   No No   Sig: Take 0.5 Tabs by mouth daily. loratadine (CLARITIN) 10 mg tablet   Yes No   Sig: Take 10 mg by mouth daily as needed for Allergies. mometasone (NASONEX) 50 mcg/actuation nasal spray   Yes No   Si Sprays by Both Nostrils route daily. omega-3 fatty acids-vitamin e (FISH OIL) 1,000 mg Cap   Yes No   Sig: Take 1 Cap by mouth daily. cholesterol   omeprazole (PRILOSEC) 20 mg capsule   No No   Sig: Take 1 capsule by mouth  daily   pravastatin (PRAVACHOL) 80 mg tablet   No No   Sig: Take 1 tablet by mouth  nightly   therapeutic multivitamin (THERA) tablet   Yes No   Sig: Take 1 Tab by mouth daily. tiotropium (SPIRIVA) 18 mcg inhalation capsule   No No   Sig: Take 1 Cap by inhalation daily.       Facility-Administered Medications: None       Hospital Medications:  Current Facility-Administered Medications   Medication Dose Route Frequency    piperacillin-tazobactam (ZOSYN) 3.375 g in 0.9% sodium chloride (MBP/ADV) 100 mL  3.375 g IntraVENous Q8H    dextrose 5% - 0.9% NaCl with KCl 20 mEq/L infusion  100 mL/hr IntraVENous CONTINUOUS    aspirin chewable tablet 81 mg  81 mg Oral DAILY    carvedilol (COREG) tablet 6.25 mg  6.25 mg Oral BID WITH MEALS    DULoxetine (CYMBALTA) capsule 60 mg  60 mg Oral DAILY    ezetimibe (ZETIA) tablet 10 mg  10 mg Oral DAILY    fenofibrate nanocrystallized (TRICOR) tablet 145 mg  145 mg Oral DAILY    lisinopril (PRINIVIL, ZESTRIL) tablet 5 mg  5 mg Oral DAILY    insulin glargine (LANTUS) injection 30 Units  30 Units SubCUTAneous DAILY    loratadine (CLARITIN) tablet 10 mg  10 mg Oral DAILY PRN    fish oil-omega-3 fatty acids 340-1,000 mg capsule 1 Cap  1 Cap Oral DAILY    pravastatin (PRAVACHOL) tablet 40 mg  40 mg Oral QHS    therapeutic multivitamin (THERAGRAN) tablet 1 Tab  1 Tab Oral DAILY    sodium chloride (NS) flush 5-40 mL  5-40 mL IntraVENous Q8H    sodium chloride (NS) flush 5-40 mL  5-40 mL IntraVENous PRN    ondansetron (ZOFRAN) injection 4 mg  4 mg IntraVENous Q4H PRN    morphine injection 2 mg  2 mg IntraVENous Q4H PRN    insulin regular (NOVOLIN R, HUMULIN R) injection   SubCUTAneous AC&HS    glucose chewable tablet 16 g  4 Tab Oral PRN    glucagon (GLUCAGEN) injection 1 mg  1 mg IntraMUSCular PRN    dextrose 10% infusion 0-250 mL  0-250 mL IntraVENous PRN    arformoterol (BROVANA) neb solution 15 mcg  15 mcg Nebulization BID RT    And    budesonide (PULMICORT) 500 mcg/2 ml nebulizer suspension  500 mcg Nebulization BID RT    hydrALAZINE (APRESOLINE) 20 mg/mL injection 20 mg  20 mg IntraVENous Q6H PRN       Social History:  Social History     Tobacco Use    Smoking status: Former Smoker     Packs/day: 1.00     Years: 45.00     Pack years: 45.00     Last attempt to quit: 2013     Years since quittin.8    Smokeless tobacco: Never Used   Substance Use Topics    Alcohol use: No       Family History:  Family History   Problem Relation Age of Onset    Arthritis-rheumatoid Sister     Osteoporosis Sister     Diabetes Brother     Stroke Brother     Elevated Lipids Mother     Alcohol abuse Father     Elevated Lipids Father     Cancer Sister     Diabetes Sister     Cancer Sister     Diabetes Brother     Diabetes Brother     Heart Disease Brother     Diabetes Brother     Diabetes Brother     Heart Disease Brother     Diabetes Maternal Aunt        Review of Systems:  Constitutional: negative fever, negative chills, negative weight loss  Eyes:   negative visual changes  ENT:   negative sore throat, tongue or lip swelling  Respiratory:  negative cough, negative dyspnea  Cards:  negative for chest pain, palpitations, lower extremity edema  GI:   See HPI  :  negative for frequency, dysuria  Integument:  negative for rash and pruritus  Heme:  negative for easy bruising and gum/nose bleeding  Musculoskel: negative for myalgias,  back pain and muscle weakness  Neuro: negative for headaches, dizziness, vertigo  Psych:  negative for feelings of anxiety, depression     Objective:     Patient Vitals for the past 8 hrs:   BP Temp Pulse Resp SpO2   11/17/19 0807 123/78 98.5 °F (36.9 °C) 100 18 95 %   11/17/19 0701 103/45  99     11/17/19 0602 116/72 98.8 °F (37.1 °C) (!) 102 18 96 %     No intake/output data recorded. 11/15 1901 - 11/17 0700  In: 100 [I.V.:100]  Out: -     PHYSICAL EXAM:  General: WD, WN. Alert, cooperative, no acute distress    HEENT: NC, Atraumatic. PERRLA, EOMI. Mild scleral icterus (?)  Lungs:  CTA Bilaterally. No Wheezing/Rhonchi/Rales. Heart:  Regular  rhythm,  No murmur (), No Rubs, No Gallops  Abdomen: Soft, Non distended, Non tender.  +Bowel sounds, no HSM  Extremities: No c/c/e  Neurologic:  CN 2-12 gi, Alert and oriented X 3. No acute neurological distress   Psych:   Good insight. Not anxious nor agitated. Data Review     Recent Labs     11/17/19 0242 11/16/19 1816   WBC 13.8* 5.9   HGB 12.8 12.2   HCT 38.7 37.2    197     Recent Labs     11/17/19  0242 11/16/19  1816    140   K 3.6 3.9    104   CO2 29 34*   BUN 17 16   CREA 1.18* 1.13*   * 204*   CA 9.8 10.1     Recent Labs     11/16/19 1816   SGOT 14*   AP 76   TP 6.5   ALB 3.8   GLOB 2.7   LPSE 153     No results for input(s): INR, PTP, APTT, INREXT in the last 72 hours.     Radiology Review    As above       Assessment:   · Choledocholithiasis  · Cholelithiasis  · Normal LFT's  · Epigastric and RUQ abdominal pain     Plan:   · MRI/MRCP with contrast - this will assist in procedure planning for ERCP given that multiple stones have been identified. Also, the patient has developed a new leukocytosis and continues to have epigastric and RUQ abdominal pain  · Clear liquid diet today  · NPO after midnight for ERCP tomorrow  · Agree with plan for Zosyn   · Hold any anticoagulation tomorrow  · Continue IVF  · Hepatic function panel, amylase, and lipase today  · Discussed with hospitalist team  · Please call with any questions     Signed By: Fer Finch.  Breanna Gómez MD     11/17/2019  12:00 PM

## 2019-11-17 NOTE — H&P
HISTORY AND PHYSICAL  Tha Obrien MD        PCP: Edwin Posadas MD    Chief Complaint:   Abdominal pain     History of present illness:   Patient is a 72-year-old female with medical history significant for hypertension, GERD, type 2 diabetes mellitus, COPD, CADs/p PCI and stents and status post EGD in 7/19 remarkable for esophagitis who presents to the emergency department with a chief complaint of worsening abdominal pain. Patient reports ,over the past few months she has been having intermittent crampy, abdominal pain more on the epigastric area , however over the past week it gets worse and since 3 days it became constant and was associated with nausea and nonbloody vomiting and loose stool. She denies any fever, chills, shortness of breath, chest pain, palpitation, dark stool or melena, yellowish discoloration of the eyes, urinary or other bowel complaints. 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 In the emergency department, V/S were unremarkable. Lab: CBC and CMP were unremarkable. CT of the abdomen revealed New choledocholithiasis and CBD dilatation.       PMH/PSH:  Past Medical History:   Diagnosis Date    Anemia NEC     Arthritis     Asthma     CAD (coronary artery disease)     NSTEMI following PCI    Calculus of kidney 7-    [facility]    Cancer ([address]     skin    Chronic pain     degenerative disc disease, lower right back    COPD     Depression     Diabetes ([address]     GERD (gastroesophageal reflux disease)     Hx of mammogram 11/29/2017    [address] [facility] - No mammographic evidence of malignancy - annual follow up recommended     Hypertension     2005 Dx    Joint pain     Morbid obesity ([address]     Psychiatric disorder     depression    Sleep disorder      Past Surgical History:   Procedure Laterality Date    CARDIAC SURG PROCEDURE UNLIST stents    COLONOSCOPY N/A 7/30/2019    COLONOSCOPY performed by Melissa Torres MD at [facility]  7/30/2019         HX APPENDECTOMY      in 30's    HX CORONARY STENT PLACEMENT  dec 2013    HX HYSTERECTOMY      HX LITHOTRIPSY      HX TRABECULECTOMY      HX TUBAL LIGATION      UPPER GI ENDOSCOPY,BIOPSY  7/30/2019            Home meds:   Prior to Admission medications    Medication Sig Start Date End Date Taking? Authorizing Provider   glycopyrrolate-formoterol (Murriel Castleman) 9-4.8 mcg HFAA Take  by inhalation. Provider, Historical   ezetimibe (ZETIA) 10 mg tablet  4/25/18   Provider, Historical   LANTUS SOLOSTAR U-100 INSULIN 100 unit/mL (3 mL) inpn INJECT 30 UNITS DAILY 4/14/18   Provider, Historical   glimepiride (AMARYL) 2 mg tablet TAKE 3 TABLETS BY MOUTH DAILY AS DIRECTED 4/14/18   Provider, Historical   ACCU-CHEK SHELIA PLUS TEST STRP strip TEST BLOOD SUAGR TWICE A DAY 3/24/18   Provider, Historical   lisinopril (PRINIVIL, ZESTRIL) 10 mg tablet Take 0.5 Tabs by mouth daily. 11/20/16   David Ayon MD   glimepiride (AMARYL) 2 mg tablet Take 1 Tab by mouth two (2) times a day for 15 days. 11/16/16 12/1/16  Martha Flores MD   omeprazole (PRILOSEC) 20 mg capsule Take 1 capsule by mouth  daily 3/4/16   Rashad Almaraz MD   fenofibrate nanocrystallized (TRICOR) 145 mg tablet Take 1 tablet by mouth  daily 3/4/16   Rashad Almaraz MD   pravastatin (PRAVACHOL) 80 mg tablet Take 1 tablet by mouth  nightly 3/4/16   Rashad Almaraz MD   DULoxetine (CYMBALTA) 60 mg capsule Take 1 capsule by mouth  every day 11/25/15   Rashad Almaraz MD   mometasone (NASONEX) 50 mcg/actuation nasal spray 2 Sprays by Both Nostrils route daily. Provider, Historical   therapeutic multivitamin (THERA) tablet Take 1 Tab by mouth daily.     Provider, Historical   albuterol (PROVENTIL VENTOLIN) 2.5 mg /3 mL (0.083 %) nebulizer solution 3 mL by Nebulization route every four (4) hours as needed for Wheezing. 4/3/15   Addie Braga NP   aspirin 81 mg chewable tablet Take 1 Tab by mouth daily. 13   Arash Isabel MD   carvedilol (COREG) 6.25 mg tablet Take 1 Tab by mouth two (2) times daily (with meals). 13   Arash Isabel MD   co-enzyme Q-10 (CO Q-10) 50 mg capsule Take 50 mg by mouth daily. Provider, Historical   loratadine (CLARITIN) 10 mg tablet Take 10 mg by mouth daily as needed for Allergies. Provider, Historical   fluticasone-salmeterol (ADVAIR DISKUS) 100-50 mcg/dose diskus inhaler Take 1 Puff by inhalation every twelve (12) hours. 13   Jigar Ramirez MD   tiotropium [facility]) 18 mcg inhalation capsule Take 1 Cap by inhalation daily. 10/11/12   Ernesto Centeno MD   albuterol (PROVENTIL HFA) 90 mcg/Actuation inhaler Take 2 Puffs by inhalation every four (4) hours as needed. Indications: BRONCHOSPASM PREVENTION 11   Provider, Historical   omega-3 fatty acids-vitamin e (FISH OIL) 1,000 mg Cap Take 1 Cap by mouth daily. cholesterol 8/28/10   Provider, Historical       Allergies:   Allergies   Allergen Reactions    Codeine Shortness of Breath    Crestor [Rosuvastatin] Other (comments)     Leg pains and could not walk    Lipitor [Atorvastatin] Myalgia    Tetanus Vaccines And Toxoid Swelling       FH:  Family History   Problem Relation Age of Onset    Arthritis-rheumatoid Sister     Osteoporosis Sister     Diabetes Brother     Stroke Brother     Elevated Lipids Mother     Alcohol abuse Father     Elevated Lipids Father     Cancer Sister     Diabetes Sister     Cancer Sister     Diabetes Brother     Diabetes Brother     Heart Disease Brother     Diabetes Brother     Diabetes Brother     Heart Disease Brother     Diabetes Maternal Aunt        SH:  Social History     Tobacco Use    Smoking status: Former Smoker     Packs/day: 1.00     Years: 45.00     Pack years: 45.00     Last attempt to quit: 2013     Years since quittin.8    Smokeless tobacco: Never Used   Substance Use Topics    Alcohol use: No       ROS:   Constitutional: No fever, chills  HENT: No icterus, no eye pain. Respiratory: Negative for chest tightness and shortness of breath. Cardiovascular: Negative for chest pain, palpitations and leg swelling. Gastrointestinal:  Negative for abdominal distention , GI bleed  Genitourinary: Negative for decreased urine volume and difficulty urinating. Musculoskeletal: Negative for neck stiffness. Neurological: Negative for speech difficulty, weakness and headaches. Psychiatric/Behavioral: Negative for agitation and behavioral problems. PHYSICAL EXAM:  Visit Vitals  /72 (BP 1 Location: Right arm)   Pulse 92   Temp 98.2 °F (36.8 °C)   Resp 20   Ht 5' 4\" (1.626 m)   Wt 90.7 kg (200 lb)   SpO2 97%   BMI 34.33 kg/m²       General:          Alert, cooperative, no distress, appears stated age. HEENT:           Atraumatic, anicteric sclerae, pink conjunctivae                          No oral ulcers, mucosa moist, throat clear, dentition fair  Neck:               Supple, symmetrical,  thyroid: non tender  Lungs:             Clear to auscultation bilaterally. No Wheezing or Rhonchi. No rales. Chest wall:      No tenderness  No Accessory muscle use. Heart:              Regular  rhythm,  No  murmur   No edema  Abdomen:        Soft, moderate tenderness over the right upper quadrant and epigastric area. No guarding ,rigidity or rebound                         tenderness , not distended. Bowel sounds normal  Extremities:     No cyanosis. No clubbing,                            Skin turgor normal, Capillary refill normal, Radial dial pulse 2+  Skin:                Not pale. Not Jaundiced  No rashes   Psych:             Not anxious or agitated. Neurologic:     Alert and oriented to PPT, CNII-XII intact. Motor and sensory exam grossly intact.   Labs/Imaging:  Recent Results (from the past 24 hour(s))   LIPASE    Collection Time: 11/16/19  6:16 PM   Result Value Ref Range    Lipase 153 73 - 393 U/L   MAGNESIUM    Collection Time: 11/16/19  6:16 PM   Result Value Ref Range    Magnesium 1.3 (L) 1.6 - 2.4 mg/dL   METABOLIC PANEL, COMPREHENSIVE    Collection Time: 11/16/19  6:16 PM   Result Value Ref Range    Sodium 140 136 - 145 mmol/L    Potassium 3.9 3.5 - 5.1 mmol/L    Chloride 104 97 - 108 mmol/L    CO2 34 (H) 21 - 32 mmol/L    Anion gap 2 (L) 5 - 15 mmol/L    Glucose 204 (H) 65 - 100 mg/dL    BUN 16 6 - 20 MG/DL    Creatinine 1.13 (H) 0.55 - 1.02 MG/DL    BUN/Creatinine ratio 14 12 - 20      GFR est AA 57 (L) >60 ml/min/1.73m2    GFR est non-AA 47 (L) >60 ml/min/1.73m2    Calcium 10.1 8.5 - 10.1 MG/DL    Bilirubin, total 0.4 0.2 - 1.0 MG/DL    ALT (SGPT) 20 12 - 78 U/L    AST (SGOT) 14 (L) 15 - 37 U/L    Alk. phosphatase 76 45 - 117 U/L    Protein, total 6.5 6.4 - 8.2 g/dL    Albumin 3.8 3.5 - 5.0 g/dL    Globulin 2.7 2.0 - 4.0 g/dL    A-G Ratio 1.4 1.1 - 2.2     CBC WITH AUTOMATED DIFF    Collection Time: 11/16/19  6:16 PM   Result Value Ref Range    WBC 5.9 3.6 - 11.0 K/uL    RBC 4.00 3.80 - 5.20 M/uL    HGB 12.2 11.5 - 16.0 g/dL    HCT 37.2 35.0 - 47.0 %    MCV 93.0 80.0 - 99.0 FL    MCH 30.5 26.0 - 34.0 PG    MCHC 32.8 30.0 - 36.5 g/dL    RDW 13.3 11.5 - 14.5 %    PLATELET 748 937 - 823 K/uL    MPV 9.5 8.9 - 12.9 FL    NRBC 0.0 0  WBC    ABSOLUTE NRBC 0.00 0.00 - 0.01 K/uL    NEUTROPHILS 71 32 - 75 %    LYMPHOCYTES 17 12 - 49 %    MONOCYTES 9 5 - 13 %    EOSINOPHILS 2 0 - 7 %    BASOPHILS 0 0 - 1 %    IMMATURE GRANULOCYTES 1 (H) 0.0 - 0.5 %    ABS. NEUTROPHILS 4.2 1.8 - 8.0 K/UL    ABS. LYMPHOCYTES 1.0 0.8 - 3.5 K/UL    ABS. MONOCYTES 0.5 0.0 - 1.0 K/UL    ABS. EOSINOPHILS 0.1 0.0 - 0.4 K/UL    ABS. BASOPHILS 0.0 0.0 - 0.1 K/UL    ABS. IMM.  GRANS. 0.0 0.00 - 0.04 K/UL    DF AUTOMATED     URINALYSIS W/MICROSCOPIC    Collection Time: 11/16/19  7:03 PM   Result Value Ref Range    Color YELLOW/STRAW      Appearance CLOUDY (A) CLEAR Specific gravity 1.012 1.003 - 1.030      pH (UA) 7.5 5.0 - 8.0      Protein NEGATIVE  NEG mg/dL    Glucose 100 (A) NEG mg/dL    Ketone NEGATIVE  NEG mg/dL    Bilirubin NEGATIVE  NEG      Blood NEGATIVE  NEG      Urobilinogen 1.0 0.2 - 1.0 EU/dL    Nitrites NEGATIVE  NEG      Leukocyte Esterase NEGATIVE  NEG      WBC 0-4 0 - 4 /hpf    RBC 0-5 0 - 5 /hpf    Epithelial cells FEW FEW /lpf    Bacteria NEGATIVE  NEG /hpf    Hyaline cast 0-2 0 - 5 /lpf   URINE CULTURE HOLD SAMPLE    Collection Time: 11/16/19  7:03 PM   Result Value Ref Range    Urine culture hold        URINE ON HOLD IN MICROBIOLOGY DEPT FOR 3 DAYS. IF UNPRESERVED URINE IS SUBMITTED, IT CANNOT BE USED FOR ADDITIONAL TESTING AFTER 24 HRS, RECOLLECTION WILL BE REQUIRED. Recent Labs     11/16/19  1816   WBC 5.9   HGB 12.2   HCT 37.2        Recent Labs     11/16/19  1816      K 3.9      CO2 34*   BUN 16   CREA 1.13*   *   CA 10.1   MG 1.3*     Recent Labs     11/16/19  1816   SGOT 14*   ALT 20   AP 76   TBILI 0.4   TP 6.5   ALB 3.8   GLOB 2.7   LPSE 153       No results for input(s): CPK, CKNDX, TROIQ in the last 72 hours. No lab exists for component: CPKMB    No results for input(s): INR, PTP, APTT, INREXT in the last 72 hours. No results for input(s): PH, PCO2, PO2 in the last 72 hours. CT ABD PELV W CONT  Narrative: EXAM: CT ABD PELV W CONT    INDICATION: Abdominal pain, nausea, and vomiting for one day. Previous  esophageal hemorrhage. Hysterectomy and appendectomy. Normal liver enzymes and  serum lipase. Normal white blood cell count. Renal insufficiency. COMPARISON: CT abdomen/pelvis on 12/19/2013. CONTRAST: 100 mL of Isovue-370. TECHNIQUE:   Following the uneventful intravenous administration of contrast, thin axial  images were obtained through the abdomen and pelvis. Coronal and sagittal  reconstructions were generated. Oral contrast was not administered.  CT dose  reduction was achieved through use of a standardized protocol tailored for this  examination and automatic exposure control for dose modulation. FINDINGS:   LUNG BASES: Heterogeneous minimal dependent atelectasis. INCIDENTALLY IMAGED HEART AND MEDIASTINUM: Unremarkable. LIVER: Heterogeneous presumed fatty infiltration. Smooth surface. Normal size. No solid mass. GALLBLADDER: Numerous subcentimeter gallstones. No mural thickening. Multiple  gallstones in the CBD (coronal image 63). CBD measures 9 mm in diameter. SPLEEN: Normal size and enhancement. PANCREAS: No mass or ductal dilatation. ADRENALS: Unremarkable. KIDNEYS: Bilateral renal scarring. Multiple bilateral renal simple cysts. No  solid renal mass or hydronephrosis. STOMACH: Unremarkable. SMALL BOWEL: No dilatation or wall thickening. COLON: Mild diverticulosis. No inflammation. APPENDIX: Appendectomy. PERITONEUM: No ascites or pneumoperitoneum. RETROPERITONEUM: Aortic atherosclerosis without aneurysm. 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 No  mesenteric arterial occlusion. REPRODUCTIVE ORGANS: Hysterectomy. No pelvic mass. URINARY BLADDER: No mass or calculus. BONES: No aggressive bone lesion. Extensive degenerative disc disease in the  lumbar spine. ADDITIONAL COMMENTS: Right flank fat-containing hernia posterior to the right  oblique musculature. No change. Impression: IMPRESSION:    1. New choledocholithiasis and CBD dilatation since 2013. Consider ERCP. MRCP is  unlikely to yield additional information. 2. Cholelithiasis. No acute cholecystitis. 3. Bilateral renal scarring and multiple renal cysts. No CT evidence of  pyelonephritis. No hydronephrosis. 4. Incidental findings are described above. Recommend IV hydration to reduce the risk of contrast induced nephrotoxicity. XR CHEST PA LAT  Narrative: Indication:  Abdominal pain, nausea.     Exam: AP upright and lateral views of the chest.    Direct comparison is made to prior CXR dated December 2018. Findings: Cardiomediastinal silhouette is within normal limits. There is very  mild left basilar scarring, unchanged. Lungs are otherwise clear. There is no  pleural fluid. There is no pneumothorax. Osseous structures are diffusely  demineralized, but intact. Impression: IMPRESSION: No acute cardiopulmonary disease. Assessment & Plan:  ===============  choledocholithiasis and CBD dilatation  No leukocytosis , liver enzymes and lipase WNL  CT revealed choledocholithiasis and CBD dilation , no cholecystitis  analgesics and antiemetics  N.p.o.  IV fluids  Gastroenterologist consult    CAD s/p PCI and stents    Continue home ASA, BB ACE and studies    Hypertension  Continue home meds  Hydralazine as needed. Type II diabetes with hyperglycemia  Hold oral meds  Continue home insulin  SSI  Accu-Checks     COPD   Not in acute exacerbation  BT as needed     Other comorbidity, continue home meds adjust as needed. Patient's Baseline: Ambulates with walking  DVT ppx: SCD  Code status: Full  Disposition: TBD  Care plan discussed with patient/Family and nurse.                 Signed By: Edward Spear MD     November 16, 2019

## 2019-11-17 NOTE — PROGRESS NOTES
Hospitalist Progress Note  Dwayne De Jesus MD  Answering service: [phone] from in house phone      Date of Service:  2019  NAME:  France Hampton  :  1940  MRN:  [id number]    Admission Summary:   79F p/w Abdominal pain for several weeks, worse last week. Interval history / Subjective:   Patient seen and examined at bedside, feels pain in abdomen, nausea, looks dry lips, not on IVF. NPO. Will start fluids, and empiric zosyn, 2mg morphine helping wth pain, but wears off in 2 hours. Will increase frequency     Assessment & Plan:     choledocholithiasis and CBD dilatation  - mild leukocytosis , liver enzymes and lipase WNL  - CT revealed choledocholithiasis and CBD dilation , no cholecystitis  - analgesics and antiemetics- NPO- IVF- GI following, plans for MRCP today and ERCP tomorrow  - start IVF after bolus, and zosyn empirically.     CAD s/p PCI and stents  - Continue home ASA, BB ACE and studies  Hypertension Continue home meds- Hydralazine as needed. DM2: with hyperglycemia- Hold oral meds- home insulin- SSI- Accu-Checks   COPD No exacerbation- BT as needed     Patient's Baseline: Ambulates with walking  DVT ppx: SCD  Code status: Full  Disposition: TBD  Care plan discussed with patient/Family and nurse. Hospital Problems  Date Reviewed: 2019          Codes Class Noted POA    Dilation of common bile duct ICD-10-CM: K83.8  ICD-9-CM: 576.8  2019 Unknown            Review of Systems:   Pertinent items are mentioned in interval history. Vital Signs:    Last 24hrs VS reviewed since prior progress note.  Most recent are:  Visit Vitals  /78 (BP 1 Location: Right arm, BP Patient Position: At rest)   Pulse 100   Temp 98.5 °F (36.9 °C)   Resp 18   Ht 5' 4\" (1.626 m)   Wt 90.7 kg (200 lb)   SpO2 95%   BMI 34.33 kg/m²         Intake/Output Summary (Last 24 hours) at 2019 1007  Last data filed at 2019 2123  Gross per 24 hour   Intake 100 ml   Output    Net 100 ml        Physical Examination:   General:  Alert, oriented, sig distress with abdominal pain/Nausea  Card:  S1, S2 without murmurs, good peripheral perfusion  Resp:  No accessory muscle use, Good AE, no wheezes, no rhonchi  Abd:  Soft, tender++ RUQ, non-distended, BS+  Extremities:  No cyanosis or clubbing, no significant edema  Neuro:  Grossly normal, no focal neuro deficits, follows commands   Psych:  Good insight, AAOx3, not agitated. Data Review:    Review and/or order of clinical lab test  Review and/or order of tests in the radiology section of [facility]  Review and/or order of tests in the medicine section of [facility]  Labs:     Recent Labs     11/17/19  0242 11/16/19  1816   WBC 13.8* 5.9   HGB 12.8 12.2   HCT 38.7 37.2    197     Recent Labs     11/17/19  0242 11/16/19  1816    140   K 3.6 3.9    104   CO2 29 34*   BUN 17 16   CREA 1.18* 1.13*   * 204*   CA 9.8 10.1   MG  --  1.3*     Recent Labs     11/16/19  1816   SGOT 14*   ALT 20   AP 76   TBILI 0.4   TP 6.5   ALB 3.8   GLOB 2.7   LPSE 153     No results for input(s): INR, PTP, APTT, INREXT in the last 72 hours. No results for input(s): FE, TIBC, PSAT, FERR in the last 72 hours. No results found for: FOL, RBCF   No results for input(s): PH, PCO2, PO2 in the last 72 hours. No results for input(s): CPK, CKNDX, TROIQ in the last 72 hours.     No lab exists for component: CPKMB  Lab Results   Component Value Date/Time    Cholesterol, total 191 09/12/2015 09:18 AM    HDL Cholesterol 45 09/12/2015 09:18 AM    LDL,Direct 121 (H) 07/22/2015 04:00 AM    LDL, calculated 109 (H) 09/12/2015 09:18 AM    Triglyceride 186 (H) 09/12/2015 09:18 AM    CHOL/HDL Ratio 5.3 (H) 07/22/2015 04:00 AM     Lab Results   Component Value Date/Time    Glucose (POC) 164 (H) 11/17/2019 05:57 AM    Glucose (POC) 214 (H) 11/17/2019 12:11 AM    Glucose (POC) 157 (H) 07/30/2019 10:08 AM    Glucose (POC) 137 (H) 06/21/2019 08:04 PM    Glucose (POC) 246 (H) 11/16/2016 11:32 AM     Lab Results   Component Value Date/Time    Color YELLOW/STRAW 11/16/2019 07:03 PM    Appearance CLOUDY (A) 11/16/2019 07:03 PM    Specific gravity 1.012 11/16/2019 07:03 PM    pH (UA) 7.5 11/16/2019 07:03 PM    Protein NEGATIVE  11/16/2019 07:03 PM    Glucose 100 (A) 11/16/2019 07:03 PM    Ketone NEGATIVE  11/16/2019 07:03 PM    Bilirubin NEGATIVE  11/16/2019 07:03 PM    Urobilinogen 1.0 11/16/2019 07:03 PM    Nitrites NEGATIVE  11/16/2019 07:03 PM    Leukocyte Esterase NEGATIVE  11/16/2019 07:03 PM    Epithelial cells FEW 11/16/2019 07:03 PM    Bacteria NEGATIVE  11/16/2019 07:03 PM    WBC 0-4 11/16/2019 07:03 PM    RBC 0-5 11/16/2019 07:03 PM     Medications Reviewed:     Current Facility-Administered Medications   Medication Dose Route Frequency    aspirin chewable tablet 81 mg  81 mg Oral DAILY    carvedilol (COREG) tablet 6.25 mg  6.25 mg Oral BID WITH MEALS    DULoxetine (CYMBALTA) capsule 60 mg  60 mg Oral DAILY    ezetimibe (ZETIA) tablet 10 mg  10 mg Oral DAILY    fenofibrate nanocrystallized (TRICOR) tablet 145 mg  145 mg Oral DAILY    lisinopril (PRINIVIL, ZESTRIL) tablet 5 mg  5 mg Oral DAILY    insulin glargine (LANTUS) injection 30 Units  30 Units SubCUTAneous DAILY    loratadine (CLARITIN) tablet 10 mg  10 mg Oral DAILY PRN    fish oil-omega-3 fatty acids 340-1,000 mg capsule 1 Cap  1 Cap Oral DAILY    pravastatin (PRAVACHOL) tablet 40 mg  40 mg Oral QHS    therapeutic multivitamin (THERAGRAN) tablet 1 Tab  1 Tab Oral DAILY    sodium chloride (NS) flush 5-40 mL  5-40 mL IntraVENous Q8H    sodium chloride (NS) flush 5-40 mL  5-40 mL IntraVENous PRN    ondansetron (ZOFRAN) injection 4 mg  4 mg IntraVENous Q4H PRN    morphine injection 2 mg  2 mg IntraVENous Q4H PRN    insulin regular (NOVOLIN R, HUMULIN R) injection   SubCUTAneous AC&HS    glucose chewable tablet 16 g  4 Tab Oral PRN    glucagon (GLUCAGEN) injection 1 mg  1 mg IntraMUSCular PRN    dextrose 10% infusion 0-250 mL  0-250 mL IntraVENous PRN    arformoterol (BROVANA) neb solution 15 mcg  15 mcg Nebulization BID RT    And    budesonide (PULMICORT) 500 mcg/2 ml nebulizer suspension  500 mcg Nebulization BID RT    hydrALAZINE (APRESOLINE) 20 mg/mL injection 20 mg  20 mg IntraVENous Q6H PRN   ______________________________________________________________________  EXPECTED LENGTH OF STAY: - - -  ACTUAL LENGTH OF STAY:          1               Redge Holter, MD

## 2019-11-17 NOTE — PROGRESS NOTES
Bedside and Verbal shift change report given to [id number] (oncoming nurse) by Jorge Luis Rob RN (offgoing nurse). Report included the following information SBAR and Kardex.

## 2019-11-17 NOTE — PROGRESS NOTES
Spoke with on call hospitalist regarding patient's pain control at 2250. Dr. Raji Borjas ordered a one time dose of 15mg toradol IV for one time, and stated if patients pain was controlled with this then to call back to get a prn order. 56: Spoke with Dr. Raji Borjas again, he ordered 15mg toradol IV q6h as needed, but will only be available for two days. Will continue to monitor patient    0400: Jaron Randall was d/c r/t patients kidney function.

## 2019-11-17 NOTE — ED NOTES
TRANSFER - OUT REPORT:    Verbal report given to MAXIMILIANO Todd (name) on Karen Wallace  being transferred to 5E(unit) for routine progression of care       Report consisted of patients Situation, Background, Assessment and   Recommendations(SBAR). Information from the following report(s) SBAR, Kardex and ED Summary was reviewed with the receiving nurse. Lines:   Peripheral IV 11/16/19 Left Antecubital (Active)   Site Assessment Clean, dry, & intact 11/16/2019  5:53 PM   Phlebitis Assessment 0 11/16/2019  5:53 PM   Infiltration Assessment 0 11/16/2019  5:53 PM   Dressing Status Clean, dry, & intact 11/16/2019  5:53 PM        Opportunity for questions and clarification was provided.       Patient transported with:   [employer]

## 2019-11-18 ENCOUNTER — APPOINTMENT (OUTPATIENT)
Dept: MRI IMAGING | Age: 79
DRG: 418 | End: 2019-11-18
Attending: INTERNAL MEDICINE
Payer: MEDICARE

## 2019-11-18 ENCOUNTER — ANESTHESIA (OUTPATIENT)
Dept: ENDOSCOPY | Age: 79
DRG: 418 | End: 2019-11-18
Payer: MEDICARE

## 2019-11-18 ENCOUNTER — ANESTHESIA EVENT (OUTPATIENT)
Dept: ENDOSCOPY | Age: 79
DRG: 418 | End: 2019-11-18
Payer: MEDICARE

## 2019-11-18 LAB
ALBUMIN SERPL-MCNC: 3.3 G/DL (ref 3.5–5)
ALBUMIN/GLOB SERPL: 1.2 {RATIO} (ref 1.1–2.2)
ALP SERPL-CCNC: 77 U/L (ref 45–117)
ALT SERPL-CCNC: 182 U/L (ref 12–78)
AST SERPL-CCNC: 137 U/L (ref 15–37)
BILIRUB DIRECT SERPL-MCNC: 0.8 MG/DL (ref 0–0.2)
BILIRUB SERPL-MCNC: 1.3 MG/DL (ref 0.2–1)
GLOBULIN SER CALC-MCNC: 2.7 G/DL (ref 2–4)
GLUCOSE BLD STRIP.AUTO-MCNC: 147 MG/DL (ref 65–100)
GLUCOSE BLD STRIP.AUTO-MCNC: 150 MG/DL (ref 65–100)
GLUCOSE BLD STRIP.AUTO-MCNC: 194 MG/DL (ref 65–100)
GLUCOSE BLD STRIP.AUTO-MCNC: 215 MG/DL (ref 65–100)
PROT SERPL-MCNC: 6 G/DL (ref 6.4–8.2)
SERVICE CMNT-IMP: ABNORMAL

## 2019-11-18 PROCEDURE — 77030006969 HC BSKT BILI RTVR BSC -D: Performed by: INTERNAL MEDICINE

## 2019-11-18 PROCEDURE — A9585 GADOBUTROL INJECTION: HCPCS | Performed by: INTERNAL MEDICINE

## 2019-11-18 PROCEDURE — 74011250636 HC RX REV CODE- 250/636: Performed by: INTERNAL MEDICINE

## 2019-11-18 PROCEDURE — 0FC98ZZ EXTIRPATION OF MATTER FROM COMMON BILE DUCT, VIA NATURAL OR ARTIFICIAL OPENING ENDOSCOPIC: ICD-10-PCS | Performed by: INTERNAL MEDICINE

## 2019-11-18 PROCEDURE — 77030021566 MRI ABD W MRCP W WO CONT

## 2019-11-18 PROCEDURE — 0F798DZ DILATION OF COMMON BILE DUCT WITH INTRALUMINAL DEVICE, VIA NATURAL OR ARTIFICIAL OPENING ENDOSCOPIC: ICD-10-PCS | Performed by: INTERNAL MEDICINE

## 2019-11-18 PROCEDURE — 77030040361 HC SLV COMPR DVT MDII -B: Performed by: INTERNAL MEDICINE

## 2019-11-18 PROCEDURE — 77030008684 HC TU ET CUF COVD -B: Performed by: NURSE ANESTHETIST, CERTIFIED REGISTERED

## 2019-11-18 PROCEDURE — 65270000032 HC RM SEMIPRIVATE

## 2019-11-18 PROCEDURE — 74011000258 HC RX REV CODE- 258: Performed by: INTERNAL MEDICINE

## 2019-11-18 PROCEDURE — 74011636320 HC RX REV CODE- 636/320: Performed by: INTERNAL MEDICINE

## 2019-11-18 PROCEDURE — 36415 COLL VENOUS BLD VENIPUNCTURE: CPT

## 2019-11-18 PROCEDURE — 77030009038 HC CATH BILI STN RTVR BSC -C: Performed by: INTERNAL MEDICINE

## 2019-11-18 PROCEDURE — 74011250636 HC RX REV CODE- 250/636: Performed by: NURSE ANESTHETIST, CERTIFIED REGISTERED

## 2019-11-18 PROCEDURE — 77030012595 HC SPHNTOM BILI BSC -D: Performed by: INTERNAL MEDICINE

## 2019-11-18 PROCEDURE — 74011250636 HC RX REV CODE- 250/636: Performed by: STUDENT IN AN ORGANIZED HEALTH CARE EDUCATION/TRAINING PROGRAM

## 2019-11-18 PROCEDURE — 74011000250 HC RX REV CODE- 250: Performed by: NURSE ANESTHETIST, CERTIFIED REGISTERED

## 2019-11-18 PROCEDURE — C2625 STENT, NON-COR, TEM W/DEL SY: HCPCS | Performed by: INTERNAL MEDICINE

## 2019-11-18 PROCEDURE — 82962 GLUCOSE BLOOD TEST: CPT

## 2019-11-18 PROCEDURE — 74011636637 HC RX REV CODE- 636/637: Performed by: STUDENT IN AN ORGANIZED HEALTH CARE EDUCATION/TRAINING PROGRAM

## 2019-11-18 PROCEDURE — 94664 DEMO&/EVAL PT USE INHALER: CPT

## 2019-11-18 PROCEDURE — 76040000008: Performed by: INTERNAL MEDICINE

## 2019-11-18 PROCEDURE — 94640 AIRWAY INHALATION TREATMENT: CPT

## 2019-11-18 PROCEDURE — 80076 HEPATIC FUNCTION PANEL: CPT

## 2019-11-18 PROCEDURE — 74011000250 HC RX REV CODE- 250: Performed by: STUDENT IN AN ORGANIZED HEALTH CARE EDUCATION/TRAINING PROGRAM

## 2019-11-18 PROCEDURE — 77030007288 HC DEV LOK BILI BSC -A: Performed by: INTERNAL MEDICINE

## 2019-11-18 PROCEDURE — 74011250636 HC RX REV CODE- 250/636

## 2019-11-18 PROCEDURE — 76060000033 HC ANESTHESIA 1 TO 1.5 HR: Performed by: INTERNAL MEDICINE

## 2019-11-18 PROCEDURE — 77030026438 HC STYL ET INTUB CARD -A: Performed by: NURSE ANESTHETIST, CERTIFIED REGISTERED

## 2019-11-18 DEVICE — BILIARY STENT WITH NAVIFLEXTM RX DELIVERY SYSTEM
Type: IMPLANTABLE DEVICE | Site: BILE DUCT | Status: FUNCTIONAL
Brand: ADVANIX™ BILIARY

## 2019-11-18 RX ORDER — SODIUM CHLORIDE, SODIUM LACTATE, POTASSIUM CHLORIDE, CALCIUM CHLORIDE 600; 310; 30; 20 MG/100ML; MG/100ML; MG/100ML; MG/100ML
INJECTION, SOLUTION INTRAVENOUS
Status: DISCONTINUED | OUTPATIENT
Start: 2019-11-18 | End: 2019-11-18 | Stop reason: HOSPADM

## 2019-11-18 RX ORDER — SUCCINYLCHOLINE CHLORIDE 20 MG/ML
INJECTION INTRAMUSCULAR; INTRAVENOUS AS NEEDED
Status: DISCONTINUED | OUTPATIENT
Start: 2019-11-18 | End: 2019-11-18 | Stop reason: HOSPADM

## 2019-11-18 RX ORDER — HYDROMORPHONE HYDROCHLORIDE 2 MG/ML
INJECTION, SOLUTION INTRAMUSCULAR; INTRAVENOUS; SUBCUTANEOUS
Status: COMPLETED
Start: 2019-11-18 | End: 2019-11-18

## 2019-11-18 RX ORDER — FLUMAZENIL 0.1 MG/ML
0.2 INJECTION INTRAVENOUS
Status: DISCONTINUED | OUTPATIENT
Start: 2019-11-18 | End: 2019-11-18 | Stop reason: HOSPADM

## 2019-11-18 RX ORDER — EPINEPHRINE 0.1 MG/ML
1 INJECTION INTRACARDIAC; INTRAVENOUS ONCE
Status: DISCONTINUED | OUTPATIENT
Start: 2019-11-18 | End: 2019-11-18 | Stop reason: HOSPADM

## 2019-11-18 RX ORDER — LIDOCAINE HYDROCHLORIDE 20 MG/ML
INJECTION, SOLUTION EPIDURAL; INFILTRATION; INTRACAUDAL; PERINEURAL AS NEEDED
Status: DISCONTINUED | OUTPATIENT
Start: 2019-11-18 | End: 2019-11-18 | Stop reason: HOSPADM

## 2019-11-18 RX ORDER — NALOXONE HYDROCHLORIDE 0.4 MG/ML
0.4 INJECTION, SOLUTION INTRAMUSCULAR; INTRAVENOUS; SUBCUTANEOUS
Status: DISCONTINUED | OUTPATIENT
Start: 2019-11-18 | End: 2019-11-18 | Stop reason: HOSPADM

## 2019-11-18 RX ORDER — SODIUM CHLORIDE 0.9 % (FLUSH) 0.9 %
5-40 SYRINGE (ML) INJECTION EVERY 8 HOURS
Status: DISCONTINUED | OUTPATIENT
Start: 2019-11-18 | End: 2019-11-19

## 2019-11-18 RX ORDER — ATROPINE SULFATE 0.1 MG/ML
1 INJECTION INTRAVENOUS ONCE
Status: DISCONTINUED | OUTPATIENT
Start: 2019-11-18 | End: 2019-11-18 | Stop reason: HOSPADM

## 2019-11-18 RX ORDER — HYDROMORPHONE HYDROCHLORIDE 1 MG/ML
0.5 INJECTION, SOLUTION INTRAMUSCULAR; INTRAVENOUS; SUBCUTANEOUS ONCE
Status: ACTIVE | OUTPATIENT
Start: 2019-11-18 | End: 2019-11-19

## 2019-11-18 RX ORDER — EPINEPHRINE 0.1 MG/ML
1 INJECTION INTRACARDIAC; INTRAVENOUS
Status: DISCONTINUED | OUTPATIENT
Start: 2019-11-18 | End: 2019-11-18 | Stop reason: HOSPADM

## 2019-11-18 RX ORDER — SODIUM CHLORIDE 0.9 % (FLUSH) 0.9 %
5-40 SYRINGE (ML) INJECTION AS NEEDED
Status: DISCONTINUED | OUTPATIENT
Start: 2019-11-18 | End: 2019-11-19

## 2019-11-18 RX ORDER — PROPOFOL 10 MG/ML
INJECTION, EMULSION INTRAVENOUS AS NEEDED
Status: DISCONTINUED | OUTPATIENT
Start: 2019-11-18 | End: 2019-11-18 | Stop reason: HOSPADM

## 2019-11-18 RX ORDER — ATROPINE SULFATE 0.1 MG/ML
0.5 INJECTION INTRAVENOUS
Status: DISCONTINUED | OUTPATIENT
Start: 2019-11-18 | End: 2019-11-18 | Stop reason: HOSPADM

## 2019-11-18 RX ORDER — MIDAZOLAM HYDROCHLORIDE 1 MG/ML
.25-5 INJECTION, SOLUTION INTRAMUSCULAR; INTRAVENOUS
Status: DISCONTINUED | OUTPATIENT
Start: 2019-11-18 | End: 2019-11-18 | Stop reason: HOSPADM

## 2019-11-18 RX ORDER — PHENYLEPHRINE HCL IN 0.9% NACL 0.4MG/10ML
SYRINGE (ML) INTRAVENOUS AS NEEDED
Status: DISCONTINUED | OUTPATIENT
Start: 2019-11-18 | End: 2019-11-18 | Stop reason: HOSPADM

## 2019-11-18 RX ORDER — DEXTROMETHORPHAN/PSEUDOEPHED 2.5-7.5/.8
1.2 DROPS ORAL
Status: DISCONTINUED | OUTPATIENT
Start: 2019-11-18 | End: 2019-11-18 | Stop reason: HOSPADM

## 2019-11-18 RX ORDER — SODIUM CHLORIDE, SODIUM LACTATE, POTASSIUM CHLORIDE, CALCIUM CHLORIDE 600; 310; 30; 20 MG/100ML; MG/100ML; MG/100ML; MG/100ML
200 INJECTION, SOLUTION INTRAVENOUS CONTINUOUS
Status: DISCONTINUED | OUTPATIENT
Start: 2019-11-18 | End: 2019-11-21

## 2019-11-18 RX ORDER — ROCURONIUM BROMIDE 10 MG/ML
INJECTION, SOLUTION INTRAVENOUS AS NEEDED
Status: DISCONTINUED | OUTPATIENT
Start: 2019-11-18 | End: 2019-11-18 | Stop reason: HOSPADM

## 2019-11-18 RX ORDER — ONDANSETRON 2 MG/ML
INJECTION INTRAMUSCULAR; INTRAVENOUS AS NEEDED
Status: DISCONTINUED | OUTPATIENT
Start: 2019-11-18 | End: 2019-11-18 | Stop reason: HOSPADM

## 2019-11-18 RX ORDER — FENTANYL CITRATE 50 UG/ML
25-200 INJECTION, SOLUTION INTRAMUSCULAR; INTRAVENOUS
Status: DISCONTINUED | OUTPATIENT
Start: 2019-11-18 | End: 2019-11-18 | Stop reason: HOSPADM

## 2019-11-18 RX ADMIN — ONDANSETRON 4 MG: 2 INJECTION INTRAMUSCULAR; INTRAVENOUS at 08:33

## 2019-11-18 RX ADMIN — SODIUM CHLORIDE, POTASSIUM CHLORIDE, SODIUM LACTATE AND CALCIUM CHLORIDE: 600; 310; 30; 20 INJECTION, SOLUTION INTRAVENOUS at 15:51

## 2019-11-18 RX ADMIN — ROCURONIUM BROMIDE 5 MG: 10 SOLUTION INTRAVENOUS at 15:56

## 2019-11-18 RX ADMIN — MORPHINE SULFATE 2 MG: 2 INJECTION, SOLUTION INTRAMUSCULAR; INTRAVENOUS at 01:19

## 2019-11-18 RX ADMIN — Medication 80 MCG: at 16:11

## 2019-11-18 RX ADMIN — PROPOFOL 150 MG: 10 INJECTION, EMULSION INTRAVENOUS at 15:56

## 2019-11-18 RX ADMIN — LIDOCAINE HYDROCHLORIDE 60 MG: 20 INJECTION, SOLUTION EPIDURAL; INFILTRATION; INTRACAUDAL; PERINEURAL at 16:47

## 2019-11-18 RX ADMIN — Medication 120 MCG: at 16:14

## 2019-11-18 RX ADMIN — SODIUM CHLORIDE 10 ML: 9 INJECTION INTRAMUSCULAR; INTRAVENOUS; SUBCUTANEOUS at 22:00

## 2019-11-18 RX ADMIN — HUMAN INSULIN 2 UNITS: 100 INJECTION, SOLUTION SUBCUTANEOUS at 18:43

## 2019-11-18 RX ADMIN — GADOBUTROL 10 ML: 604.72 INJECTION INTRAVENOUS at 12:32

## 2019-11-18 RX ADMIN — LIDOCAINE HYDROCHLORIDE 40 MG: 20 INJECTION, SOLUTION EPIDURAL; INFILTRATION; INTRACAUDAL; PERINEURAL at 15:56

## 2019-11-18 RX ADMIN — Medication 120 MCG: at 16:19

## 2019-11-18 RX ADMIN — PIPERACILLIN AND TAZOBACTAM 3.38 G: 3; .375 INJECTION, POWDER, FOR SOLUTION INTRAVENOUS at 05:05

## 2019-11-18 RX ADMIN — Medication 120 MCG: at 16:01

## 2019-11-18 RX ADMIN — HUMAN INSULIN 2 UNITS: 100 INJECTION, SOLUTION SUBCUTANEOUS at 07:11

## 2019-11-18 RX ADMIN — MORPHINE SULFATE 2 MG: 2 INJECTION, SOLUTION INTRAMUSCULAR; INTRAVENOUS at 23:40

## 2019-11-18 RX ADMIN — ONDANSETRON 4 MG: 2 INJECTION INTRAMUSCULAR; INTRAVENOUS at 23:40

## 2019-11-18 RX ADMIN — SODIUM CHLORIDE 100 ML: 900 INJECTION, SOLUTION INTRAVENOUS at 12:32

## 2019-11-18 RX ADMIN — SODIUM CHLORIDE 10 ML: 9 INJECTION INTRAMUSCULAR; INTRAVENOUS; SUBCUTANEOUS at 05:05

## 2019-11-18 RX ADMIN — MORPHINE SULFATE 2 MG: 2 INJECTION, SOLUTION INTRAMUSCULAR; INTRAVENOUS at 08:24

## 2019-11-18 RX ADMIN — POTASSIUM CHLORIDE, DEXTROSE MONOHYDRATE AND SODIUM CHLORIDE 100 ML/HR: 150; 5; 900 INJECTION, SOLUTION INTRAVENOUS at 08:29

## 2019-11-18 RX ADMIN — ONDANSETRON HYDROCHLORIDE 4 MG: 2 INJECTION, SOLUTION INTRAMUSCULAR; INTRAVENOUS at 16:46

## 2019-11-18 RX ADMIN — SODIUM CHLORIDE, SODIUM LACTATE, POTASSIUM CHLORIDE, AND CALCIUM CHLORIDE 200 ML/HR: 600; 310; 30; 20 INJECTION, SOLUTION INTRAVENOUS at 18:44

## 2019-11-18 RX ADMIN — HYDROMORPHONE HYDROCHLORIDE 0.5 MG: 2 INJECTION INTRAMUSCULAR; INTRAVENOUS; SUBCUTANEOUS at 14:56

## 2019-11-18 RX ADMIN — BUDESONIDE 500 MCG: 0.5 INHALANT RESPIRATORY (INHALATION) at 22:27

## 2019-11-18 RX ADMIN — Medication 80 MCG: at 16:05

## 2019-11-18 RX ADMIN — MORPHINE SULFATE 2 MG: 2 INJECTION, SOLUTION INTRAMUSCULAR; INTRAVENOUS at 05:31

## 2019-11-18 RX ADMIN — ONDANSETRON 4 MG: 2 INJECTION INTRAMUSCULAR; INTRAVENOUS at 13:33

## 2019-11-18 RX ADMIN — INSULIN GLARGINE 30 UNITS: 100 INJECTION, SOLUTION SUBCUTANEOUS at 10:13

## 2019-11-18 RX ADMIN — ARFORMOTEROL TARTRATE 15 MCG: 15 SOLUTION RESPIRATORY (INHALATION) at 22:27

## 2019-11-18 RX ADMIN — Medication 10 ML: at 22:00

## 2019-11-18 RX ADMIN — MORPHINE SULFATE 2 MG: 2 INJECTION, SOLUTION INTRAMUSCULAR; INTRAVENOUS at 13:27

## 2019-11-18 RX ADMIN — PIPERACILLIN AND TAZOBACTAM 3.38 G: 3; .375 INJECTION, POWDER, FOR SOLUTION INTRAVENOUS at 23:36

## 2019-11-18 RX ADMIN — HUMAN INSULIN 3 UNITS: 100 INJECTION, SOLUTION SUBCUTANEOUS at 13:27

## 2019-11-18 RX ADMIN — Medication 80 MCG: at 16:09

## 2019-11-18 RX ADMIN — MORPHINE SULFATE 2 MG: 2 INJECTION, SOLUTION INTRAMUSCULAR; INTRAVENOUS at 10:36

## 2019-11-18 RX ADMIN — MORPHINE SULFATE 2 MG: 2 INJECTION, SOLUTION INTRAMUSCULAR; INTRAVENOUS at 03:21

## 2019-11-18 RX ADMIN — Medication 80 MCG: at 16:07

## 2019-11-18 RX ADMIN — PIPERACILLIN AND TAZOBACTAM 3.38 G: 3; .375 INJECTION, POWDER, FOR SOLUTION INTRAVENOUS at 13:28

## 2019-11-18 RX ADMIN — Medication 120 MCG: at 16:29

## 2019-11-18 RX ADMIN — SUCCINYLCHOLINE CHLORIDE 160 MG: 20 INJECTION, SOLUTION INTRAMUSCULAR; INTRAVENOUS at 15:56

## 2019-11-18 RX ADMIN — PROPOFOL 50 MG: 10 INJECTION, EMULSION INTRAVENOUS at 16:19

## 2019-11-18 NOTE — PROGRESS NOTES
Occupational Therapy  Chart reviewed. Attempted to see pt for OT assessment. Pt is currently off the floor. Will follow up tomorrow.  Carlton Solorzano OT

## 2019-11-18 NOTE — PROGRESS NOTES

## 2019-11-18 NOTE — ANESTHESIA PREPROCEDURE EVALUATION
Relevant Problems   No relevant active problems       Anesthetic History   No history of anesthetic complications            Review of Systems / Medical History  Patient summary reviewed, nursing notes reviewed and pertinent labs reviewed    Pulmonary    COPD      Smoker (39 pk yr smoker, quit in 2013)  Asthma     Comments: 3 Liters oxygen at home RTC   Neuro/Psych         Psychiatric history (depression)    Comments: Chronic low back pain  Depression   Cardiovascular    Hypertension          Past MI (NSTEMI followiing PCI), CAD, cardiac stents (x 4 in 2013) and hyperlipidemia    Exercise tolerance: <4 METS  Comments: 6-21-19 EKG:  NSR, nonspec ST and T wave    11-14-16 ECHO: 70% EF    Arrived in wheelchair for procedure, uses rollator at home   GI/Hepatic/Renal     GERD    Renal disease: stones      Comments: Heme pos stool  Dilation of common bile duct Endo/Other    Diabetes: using insulin    Obesity, arthritis and cancer (skin)  Pertinent negatives: No morbid obesity   Other Findings              Physical Exam    Airway  Mallampati: III  TM Distance: 4 - 6 cm  Neck ROM: normal range of motion   Mouth opening: Normal     Cardiovascular  Regular rate and rhythm,  S1 and S2 normal,  no murmur, click, rub, or gallop  Rhythm: regular  Rate: normal         Dental    Dentition: Bridges  Comments: Chipped lower teeth, none loose   Pulmonary      Decreased breath sounds: bilateral      Prolonged expiration     Abdominal  GI exam deferred       Other Findings            Anesthetic Plan    ASA: 3  Anesthesia type: general          Induction: Intravenous  Anesthetic plan and risks discussed with: Patient

## 2019-11-18 NOTE — PROGRESS NOTES
Patient presents to this RN as irritable, verbally aggressive, and demanding. Patient allowed morphine Q2, reporting that pain is constant 10/10 rating despite being medicated as often as possible. Patient agitated that MRCP has not occurred yet this morning, states she was told it would be before 9 AM. Family further presents with agitation, stating that patient has been kept in hospital for three days for no reason, doctors are refusing to see patient until after MRCP is complete, and that pain is not being controlled.      Patient has been visited by volunteers, unit manager, counselor this AM.

## 2019-11-18 NOTE — PROGRESS NOTES
KIA:    MRCP later today. Care Management Interventions  PCP Verified by CM: Yes  Mode of Transport at Discharge: BLS  Transition of Care Consult (CM Consult): Discharge Planning  MyChart Signup: No  Discharge Durable Medical Equipment: No  Physical Therapy Consult: No  Occupational Therapy Consult: No  Speech Therapy Consult: No  Current Support Network: Lives Alone, Family Lives Nearby  Confirm Follow Up Transport: Family  Plan discussed with Pt/Family/Caregiver: Yes  Discharge Location  Discharge Placement: (TBD)    Reason for Admission:   Abdominal pain. RRAT Score: 20                 Do you (patient/family) have any concerns for transition/discharge? Patient lives alone. Plan for utilizing home health:   No HH orders at this time. Current Advanced Directive/Advance Care Plan:  Adv. Care plan not on file. Transition of Care Plan:          CM met with patient to introduce CM role, verify demographics and begin discharge planning. Patient lives in a one story house alone and has two dogs to take care of there. Patient has two daughters and a son who provide support when needed. They will be available to take her to follow-up appointments and to  prescription medications as needed. Patient has a walker, a rollator, and a cane at home. She gets her prescriptions filled at [facility]. Patient is on 3L O2 at home.     Patient's physical address is :  [address] [place] [address]    Familia Brown RN/CRM

## 2019-11-18 NOTE — DIABETES MGMT
[facility]    [facility] Progress Note    Recommendations/ Comments: Chart review for hyperglycemia. Pt NPO at this time for ERCP. BG consistently above 180 mg/dl x 24 hours on current insulin regimen. Of note, home oral anti-hyperglycemics currently on hold. If appropriate, please consider:  Continue to titrate basal regimen - consider 34 units. Change correction scale to lispro insulin. Current hospital DM medication: lantus, 30 units daily  Novolin R correction scale    Chart reviewed on Joann Dsouza. Patient is a 70 y.o. female with known hx of DM on Lantus, 30 units, amaryl, 2 mg TID at home. A1c:   Lab Results   Component Value Date/Time    Hemoglobin A1c 9.7 (H) 11/14/2016 06:00 AM    Hemoglobin A1c 8.3 (H) 09/12/2015 09:18 AM       Recent Glucose Results:   Lab Results   Component Value Date/Time    GLUCPOC 215 (H) 11/18/2019 11:30 AM    GLUCPOC 194 (H) 11/18/2019 06:51 AM    GLUCPOC 201 (H) 11/17/2019 09:47 PM        Lab Results   Component Value Date/Time    Creatinine 1.18 (H) 11/17/2019 02:42 AM     Estimated Creatinine Clearance: 43.6 mL/min (A) (based on SCr of 1.18 mg/dL (H)). Active Orders   Diet    DIET NPO        PO intake:   Patient Vitals for the past 72 hrs:   % Diet Eaten   11/18/19 1232 0 %   11/18/19 1151 0 %   11/18/19 0845 0 %       Will continue to follow as needed.     Thank you  Shana Savage RD, Diabetes Clinician        Time spent: 5 minutes

## 2019-11-18 NOTE — ROUTINE PROCESS
Bedside and Verbal shift change report given to Marck Simeon (oncoming nurse) by Dana Fitzgerald RN (offgoing nurse). Report included the following information SBAR, Kardex, Intake/Output, MAR and Recent Results.
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
## 2019-11-18 NOTE — PROGRESS NOTES
Called MRI inquiring about timing of MRCP for today. This nurse was told that patient was not on schedule, the doctor had been notified, and that they would have to fit in the patient in a hole in the schedule. The patients nurse Sharif De Dios RN has been made aware as well as Je Bey RN.
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
## 2019-11-18 NOTE — PROGRESS NOTES
Problem: Pressure Injury - Risk of  Goal: *Prevention of pressure injury  Description  Document Fam Scale and appropriate interventions in the flowsheet. Outcome: Progressing Towards Goal  Note:   Pressure Injury Interventions: Activity Interventions: Increase time out of bed    Mobility Interventions: Pressure redistribution bed/mattress (bed type)    Nutrition Interventions: Document food/fluid/supplement intake                     Problem: Patient Education: Go to Patient Education Activity  Goal: Patient/Family Education  Outcome: Progressing Towards Goal     Problem: Falls - Risk of  Goal: *Absence of Falls  Description  Document Rogerio Reilly Fall Risk and appropriate interventions in the flowsheet.   Outcome: Progressing Towards Goal  Note:   Fall Risk Interventions:  Mobility Interventions: Communicate number of staff needed for ambulation/transfer         Medication Interventions: Patient to call before getting OOB    Elimination Interventions: Call light in reach    History of Falls Interventions: Door open when patient unattended         Problem: Patient Education: Go to Patient Education Activity  Goal: Patient/Family Education  Outcome: Progressing Towards Goal

## 2019-11-18 NOTE — PROCEDURES
[address] [place] [address]       NAME:  Livan Belcher   :   1940   MRN:   [id number]       Procedure Type:   ERCP with biliary sphincterotomy, biliary stone removal, biliary stent placement     Indications: abnormal CT/MRCP, common bile duct stone, biliary obstruction  Pre-operative Diagnosis: see indication above  Post-operative Diagnosis:  See findings below  : Zain Chacon MD    Referring Provider:    PETERSON Llanos MD, Bryson Walden MD      Sedation:  General anesthesia    Procedure Details:  After informed consent was obtained with all risks and benefits of procedure explained, the patient was taken to the fluoroscopy suite and placed in the prone position. Upon sequential sedation as per above, the Olympus duodenoscope [id number]   was inserted via the mouthpiece and carefully advanced to the second portion of the duodenum. The quality of visualization was good. The duodenoscope was withdrawn into a short position. Findings:   Esophagus:normal  Stomach: normal   Duodenum: normal appearing to second portion, ampulla was normal appearing    ERCP: A  film was taken. The ampulla was normal appearing. Using a [employer] [id number] Jagtome preloaded with a 0.025 inch jagwire, the bile duct was cannulated with the guidewire in the first attempt. The Signa Chroman was then advanced over the guidewire. Bile was aspirated to confirm proper positioning. Limited contrast was injected under fluoroscopic visualization. The bile duct was mildly dilated. There were innumerable filling defects consistent with stones and sludge. Next, a 9-10 mm biliary sphincterotomy was effected. There was no post-sphincterotomy bleeding. Next, a 9 mm to 12 mm biliary extraction balloon was used to sweep the bile duct multiple times. Numerous pigmented stones and stone fragments were expelled.  Spontaneous contrast and biliary drainage was not appreciated, so a 2.0 cm trapezoid basket was used to effect mechanical basket lithotripsy. This was successful in removing two more stones. Next, the biliary extraction balloon was used to sweep the bile duct again and another stone was removed. Given that there were numerous stones with a background of sludge, a 10 Fr by 7 cm plastic biliary stent was placed to ensure biliary drainage. The cystic duct take-off filled distally, but the gallbladder did not fill with contrast. There was adequate drainage of contrast and bile. The pancreatic duct was neither entered or injected during this procedure. I performed all immediate radiologic interpretation during this procedure. Specimen Removed: none    Complications: None. EBL:  None. Interventions:    Pancreatic: see above  Biliary: see above    Impression:   1. Choledocholithiasis - innumerable stones and sludge were removed with a combination of biliary sphincterotomy, biliary balloon extraction, and basket lithotripsy  2. Placement of a 10 Fr by 7 cm plastic biliary stent to ensure biliary drainage    Recommendations:      1. Watch for complications, including cholangitis, pancreatitis, bleeding, and perforation. 2. IV  cc/h overnight   3. PRN pain medication and anti-emetics  4. NPO for now. Clear liquid diet if patient is pain free. 5. If patient has progressive abdominal pain and/or change in abdominal exam, please check amylase, lipase, CBC, CMP, and upright KUB. 6. Monitor LFT's  7. Surgery consult for cholecystectomy - based on today's MRCP, patient has MRI evidence of cholecystitis  8. Repeat ERCP in 4 weeks following recovery from cholecystectomy  9. Will follow along with you           Discharge Disposition:  Hospital floor following recovery in Endoscopy    Signed By: Scott James.  Karon Hoyt MD     11/18/2019  4:52 PM

## 2019-11-18 NOTE — PROGRESS NOTES
Consult received and chart reviewed. Patient is currently off the floor for MRCP. Spoke with family who report that she is usually very calm and cooperative and independent. However, the pain has been very difficult for her. We will follow up tomorrow and assess mobility as she is able to tolerate.     Yousif De Leon, PT

## 2019-11-18 NOTE — PROGRESS NOTES
Hospitalist Progress Note  Bretta Sandhoff, MD  Answering service: [phone] from in house phone      Date of Service:  2019  NAME:  Uriel Lyles  :  1940  MRN:  [id number]    Admission Summary:   79F p/w Abdominal pain for several weeks, worse last week. Interval history / Subjective:   Patient seen and examined at bedside, feels miserable with lots of pain, family at bedside, very angry, apparently they called night team for change in pain regimen as last night she was in lots of pain, and now one was listening to them. I spent very long time addressing their concerns and answering their questions to the best of my ability. Also frustrated with procedure taking long time to get done. Assessment & Plan:     choledocholithiasis and CBD dilatation  - mild leukocytosis , liver enzymes and lipase WNL  - CT revealed choledocholithiasis and CBD dilation , no cholecystitis  - analgesics and antiemetics- NPO- GI following: s/p MRCP, going for ERCP soon  - IVF, zosyn empirically. Pain not well controlled, will give single dose dilaudid iv now, re-evaluate pain control post procedure if still in pain.     CAD s/p PCI and stents  - Continue home ASA, BB ACE and studies  Hypertension Continue home meds- Hydralazine as needed. DM2: with hyperglycemia- Hold oral meds- home insulin- SSI- Accu-Checks   COPD No exacerbation- BT as needed     Patient's Baseline: Ambulates with walking  DVT ppx: SCD  Code status: Full  Disposition: TBD  Care plan discussed with patient/Family and nurse. Hospital Problems  Date Reviewed: 2019          Codes Class Noted POA    Dilation of common bile duct ICD-10-CM: K83.8  ICD-9-CM: 576.8  2019 Unknown            Review of Systems:   Pertinent items are mentioned in interval history. Vital Signs:    Last 24hrs VS reviewed since prior progress note.  Most recent are:  Visit Vitals  /87 Pulse 97   Temp 99.3 °F (37.4 °C)   Resp 16   Ht 5' 4\" (1.626 m)   Wt 90.7 kg (200 lb)   SpO2 94%   BMI 34.33 kg/m²         Intake/Output Summary (Last 24 hours) at 11/18/2019 1119  Last data filed at 11/17/2019 2014  Gross per 24 hour   Intake 1511.67 ml   Output 250 ml   Net 1261.67 ml        Physical Examination:   General:  Alert, oriented, sig distress with abdominal pain/Nausea  Resp:  No accessory muscle use, Good AE, no wheezes, no rhonchi  Abd:  Soft, tender++ RUQ, non-distended  Extremities:  No cyanosis or clubbing, no significant edema  Neuro:  Grossly normal, no focal neuro deficits, follows commands   Psych:  Good insight, AAOx3, not agitated. Data Review:    Review and/or order of clinical lab test  Review and/or order of tests in the radiology section of CPT  Review and/or order of tests in the medicine section of CPT  Labs:     Recent Labs     11/17/19  0242 11/16/19  1816   WBC 13.8* 5.9   HGB 12.8 12.2   HCT 38.7 37.2    197     Recent Labs     11/17/19  0242 11/16/19  1816    140   K 3.6 3.9    104   CO2 29 34*   BUN 17 16   CREA 1.18* 1.13*   * 204*   CA 9.8 10.1   MG  --  1.3*     Recent Labs     11/18/19  0341 11/17/19  1334 11/16/19  1816   SGOT 137* 91* 14*   * 96* 20   AP 77 65 76   TBILI 1.3* 0.7 0.4   TP 6.0* 6.3* 6.5   ALB 3.3* 3.4* 3.8   GLOB 2.7 2.9 2.7   AML  --  32  --    LPSE  --  90 153     No results for input(s): INR, PTP, APTT, INREXT, INREXT in the last 72 hours. No results for input(s): FE, TIBC, PSAT, FERR in the last 72 hours. No results found for: FOL, RBCF   No results for input(s): PH, PCO2, PO2 in the last 72 hours. No results for input(s): CPK, CKNDX, TROIQ in the last 72 hours.     No lab exists for component: CPKMB  Lab Results   Component Value Date/Time    Cholesterol, total 191 09/12/2015 09:18 AM    HDL Cholesterol 45 09/12/2015 09:18 AM    LDL,Direct 121 (H) 07/22/2015 04:00 AM    LDL, calculated 109 (H) 09/12/2015 09:18 AM    Triglyceride 186 (H) 09/12/2015 09:18 AM    CHOL/HDL Ratio 5.3 (H) 07/22/2015 04:00 AM     Lab Results   Component Value Date/Time    Glucose (POC) 194 (H) 11/18/2019 06:51 AM    Glucose (POC) 201 (H) 11/17/2019 09:47 PM    Glucose (POC) 223 (H) 11/17/2019 04:04 PM    Glucose (POC) 158 (H) 11/17/2019 11:24 AM    Glucose (POC) 164 (H) 11/17/2019 05:57 AM     Lab Results   Component Value Date/Time    Color YELLOW/STRAW 11/16/2019 07:03 PM    Appearance CLOUDY (A) 11/16/2019 07:03 PM    Specific gravity 1.012 11/16/2019 07:03 PM    pH (UA) 7.5 11/16/2019 07:03 PM    Protein NEGATIVE  11/16/2019 07:03 PM    Glucose 100 (A) 11/16/2019 07:03 PM    Ketone NEGATIVE  11/16/2019 07:03 PM    Bilirubin NEGATIVE  11/16/2019 07:03 PM    Urobilinogen 1.0 11/16/2019 07:03 PM    Nitrites NEGATIVE  11/16/2019 07:03 PM    Leukocyte Esterase NEGATIVE  11/16/2019 07:03 PM    Epithelial cells FEW 11/16/2019 07:03 PM    Bacteria NEGATIVE  11/16/2019 07:03 PM    WBC 0-4 11/16/2019 07:03 PM    RBC 0-5 11/16/2019 07:03 PM     Medications Reviewed:     Current Facility-Administered Medications   Medication Dose Route Frequency    piperacillin-tazobactam (ZOSYN) 3.375 g in 0.9% sodium chloride (MBP/ADV) 100 mL  3.375 g IntraVENous Q8H    dextrose 5% - 0.9% NaCl with KCl 20 mEq/L infusion  100 mL/hr IntraVENous CONTINUOUS    morphine injection 2 mg  2 mg IntraVENous Q2H PRN    aspirin chewable tablet 81 mg  81 mg Oral DAILY    carvedilol (COREG) tablet 6.25 mg  6.25 mg Oral BID WITH MEALS    DULoxetine (CYMBALTA) capsule 60 mg  60 mg Oral DAILY    ezetimibe (ZETIA) tablet 10 mg  10 mg Oral DAILY    fenofibrate nanocrystallized (TRICOR) tablet 145 mg  145 mg Oral DAILY    lisinopril (PRINIVIL, ZESTRIL) tablet 5 mg  5 mg Oral DAILY    insulin glargine (LANTUS) injection 30 Units  30 Units SubCUTAneous DAILY    loratadine (CLARITIN) tablet 10 mg  10 mg Oral DAILY PRN    fish oil-omega-3 fatty acids 340-1,000 mg capsule 1 Cap  1 Cap Oral DAILY    pravastatin (PRAVACHOL) tablet 40 mg  40 mg Oral QHS    therapeutic multivitamin (THERAGRAN) tablet 1 Tab  1 Tab Oral DAILY    sodium chloride (NS) flush 5-40 mL  5-40 mL IntraVENous Q8H    sodium chloride (NS) flush 5-40 mL  5-40 mL IntraVENous PRN    ondansetron (ZOFRAN) injection 4 mg  4 mg IntraVENous Q4H PRN    insulin regular (NOVOLIN R, HUMULIN R) injection   SubCUTAneous AC&HS    glucose chewable tablet 16 g  4 Tab Oral PRN    glucagon (GLUCAGEN) injection 1 mg  1 mg IntraMUSCular PRN    dextrose 10% infusion 0-250 mL  0-250 mL IntraVENous PRN    arformoterol (BROVANA) neb solution 15 mcg  15 mcg Nebulization BID RT    And    budesonide (PULMICORT) 500 mcg/2 ml nebulizer suspension  500 mcg Nebulization BID RT    hydrALAZINE (APRESOLINE) 20 mg/mL injection 20 mg  20 mg IntraVENous Q6H PRN   ______________________________________________________________________  EXPECTED LENGTH OF STAY: - - -  ACTUAL LENGTH OF STAY:          2               Pham Leon MD

## 2019-11-18 NOTE — PROGRESS NOTES
TRANSFER - OUT REPORT:    Verbal report given to Greta(name) on Tomy Gain  being transferred to [facility](unit) for routine progression of care       Report consisted of patients Situation, Background, Assessment and   Recommendations(SBAR). Information from the following report(s) SBAR, Intake/Output and MAR was reviewed with the receiving nurse. Lines:   Peripheral IV 11/16/19 Left Antecubital (Active)   Site Assessment Clean, dry, & intact 11/18/2019  8:45 AM   Phlebitis Assessment 0 11/18/2019  8:45 AM   Infiltration Assessment 0 11/18/2019  8:45 AM   Dressing Status Clean, dry, & intact 11/18/2019  8:45 AM   Dressing Type Transparent 11/18/2019  8:45 AM   Hub Color/Line Status Pink; Infusing 11/18/2019  8:45 AM   Action Taken Open ports on tubing capped 11/18/2019  8:45 AM   Alcohol Cap Used Yes 11/18/2019  8:45 AM        Opportunity for questions and clarification was provided.       Patient transported with:   O2 @ 4 liters

## 2019-11-18 NOTE — PROGRESS NOTES
Tj Thomas  1940  [id number]    Situation:    Scheduled Procedure: Procedure(s):  ENDOSCOPIC RETROGRADE CHOLANGIOPANCREATOGRAPHY (ERCP)  Verbal report received from: Christine Mast RN  Preoperative diagnosis: CBD stones    Background:    Procedure: Procedure(s):  ENDOSCOPIC RETROGRADE CHOLANGIOPANCREATOGRAPHY (ERCP)  Physician performing procedure;  Dr. Jodie Elise RN    NPO Status/Last PO Intake: yes    Pregnancy Test:No If yes, result: none    Is the patient taking Blood Thinners: NO If yes, list:  and last taken   Is the patient diabetic:mark      If yes, what was the last BS:  215  Time taken? 130  Anything given? yes 3 units          Does the patient have a Pacemaker/Defibrillator in place?: no   Does the patient need antibiotics before/during/after procedure: no is on Zosyn  If the patient is having a colon, How much prep was drank? n/a   What were the Colon prep results? n/a   Does the patient have SCD in place:no        Assessment:  Are the vital signs stable prior to patient coming to ENDO?  yes  Is the patient alert/oriented and able to sign consent for the procedures:yes  How does the patient's abdomen feel prior to coming to ENDO? round and soft yes   Does the patient have a patient IV in place?  yes     Recommendation:

## 2019-11-19 ENCOUNTER — ANESTHESIA EVENT (OUTPATIENT)
Dept: SURGERY | Age: 79
DRG: 418 | End: 2019-11-19
Payer: MEDICARE

## 2019-11-19 ENCOUNTER — ANESTHESIA (OUTPATIENT)
Dept: SURGERY | Age: 79
DRG: 418 | End: 2019-11-19
Payer: MEDICARE

## 2019-11-19 LAB
ALBUMIN SERPL-MCNC: 2.7 G/DL (ref 3.5–5)
ALBUMIN/GLOB SERPL: 0.8 {RATIO} (ref 1.1–2.2)
ALP SERPL-CCNC: 83 U/L (ref 45–117)
ALT SERPL-CCNC: 126 U/L (ref 12–78)
AMYLASE SERPL-CCNC: 14 U/L (ref 25–115)
AST SERPL-CCNC: 56 U/L (ref 15–37)
BILIRUB DIRECT SERPL-MCNC: 0.6 MG/DL (ref 0–0.2)
BILIRUB SERPL-MCNC: 1.1 MG/DL (ref 0.2–1)
GLOBULIN SER CALC-MCNC: 3.5 G/DL (ref 2–4)
GLUCOSE BLD STRIP.AUTO-MCNC: 120 MG/DL (ref 65–100)
GLUCOSE BLD STRIP.AUTO-MCNC: 136 MG/DL (ref 65–100)
GLUCOSE BLD STRIP.AUTO-MCNC: 140 MG/DL (ref 65–100)
GLUCOSE BLD STRIP.AUTO-MCNC: 142 MG/DL (ref 65–100)
GLUCOSE BLD STRIP.AUTO-MCNC: 277 MG/DL (ref 65–100)
LIPASE SERPL-CCNC: 40 U/L (ref 73–393)
PROT SERPL-MCNC: 6.2 G/DL (ref 6.4–8.2)
SERVICE CMNT-IMP: ABNORMAL

## 2019-11-19 PROCEDURE — 77030011640 HC PAD GRND REM COVD -A: Performed by: SURGERY

## 2019-11-19 PROCEDURE — 77030010513 HC APPL CLP LIG J&J -C: Performed by: SURGERY

## 2019-11-19 PROCEDURE — 74011250636 HC RX REV CODE- 250/636: Performed by: NURSE PRACTITIONER

## 2019-11-19 PROCEDURE — 77030020829: Performed by: SURGERY

## 2019-11-19 PROCEDURE — 74011250636 HC RX REV CODE- 250/636: Performed by: STUDENT IN AN ORGANIZED HEALTH CARE EDUCATION/TRAINING PROGRAM

## 2019-11-19 PROCEDURE — 74011250636 HC RX REV CODE- 250/636: Performed by: INTERNAL MEDICINE

## 2019-11-19 PROCEDURE — 82962 GLUCOSE BLOOD TEST: CPT

## 2019-11-19 PROCEDURE — 74011250636 HC RX REV CODE- 250/636: Performed by: ANESTHESIOLOGY

## 2019-11-19 PROCEDURE — 74011250637 HC RX REV CODE- 250/637: Performed by: SURGERY

## 2019-11-19 PROCEDURE — 77030036731 HC STPLR ENDOSC J&J -F: Performed by: SURGERY

## 2019-11-19 PROCEDURE — 76010000131 HC OR TIME 2 TO 2.5 HR: Performed by: SURGERY

## 2019-11-19 PROCEDURE — 83690 ASSAY OF LIPASE: CPT

## 2019-11-19 PROCEDURE — 74011636637 HC RX REV CODE- 636/637: Performed by: SURGERY

## 2019-11-19 PROCEDURE — 77030002933 HC SUT MCRYL J&J -A: Performed by: SURGERY

## 2019-11-19 PROCEDURE — 74011250637 HC RX REV CODE- 250/637: Performed by: STUDENT IN AN ORGANIZED HEALTH CARE EDUCATION/TRAINING PROGRAM

## 2019-11-19 PROCEDURE — 76060000035 HC ANESTHESIA 2 TO 2.5 HR: Performed by: SURGERY

## 2019-11-19 PROCEDURE — 77030040361 HC SLV COMPR DVT MDII -B: Performed by: SURGERY

## 2019-11-19 PROCEDURE — 77030003578 HC NDL INSUF VERES AMR -B: Performed by: SURGERY

## 2019-11-19 PROCEDURE — 74011250636 HC RX REV CODE- 250/636: Performed by: NURSE ANESTHETIST, CERTIFIED REGISTERED

## 2019-11-19 PROCEDURE — 74011000258 HC RX REV CODE- 258: Performed by: SURGERY

## 2019-11-19 PROCEDURE — 74011250636 HC RX REV CODE- 250/636: Performed by: SURGERY

## 2019-11-19 PROCEDURE — 77030027743 HC APPL F/HEMSTAT BARD -B: Performed by: SURGERY

## 2019-11-19 PROCEDURE — 74011000250 HC RX REV CODE- 250: Performed by: SURGERY

## 2019-11-19 PROCEDURE — 74011000258 HC RX REV CODE- 258: Performed by: INTERNAL MEDICINE

## 2019-11-19 PROCEDURE — 65270000032 HC RM SEMIPRIVATE

## 2019-11-19 PROCEDURE — 94664 DEMO&/EVAL PT USE INHALER: CPT

## 2019-11-19 PROCEDURE — 77030009851 HC PCH RTVR ENDOSC AMR -B: Performed by: SURGERY

## 2019-11-19 PROCEDURE — 88304 TISSUE EXAM BY PATHOLOGIST: CPT

## 2019-11-19 PROCEDURE — 76210000017 HC OR PH I REC 1.5 TO 2 HR: Performed by: SURGERY

## 2019-11-19 PROCEDURE — 77030002916 HC SUT ETHLN J&J -A: Performed by: SURGERY

## 2019-11-19 PROCEDURE — 77030008756 HC TU IRR SUC STRY -B: Performed by: SURGERY

## 2019-11-19 PROCEDURE — 77030026438 HC STYL ET INTUB CARD -A: Performed by: ANESTHESIOLOGY

## 2019-11-19 PROCEDURE — 77030018836 HC SOL IRR NACL ICUM -A: Performed by: SURGERY

## 2019-11-19 PROCEDURE — 77030032060 HC PWDR HEMSTAT ARISTA ASRB 3GM BARD -C: Performed by: SURGERY

## 2019-11-19 PROCEDURE — 94640 AIRWAY INHALATION TREATMENT: CPT

## 2019-11-19 PROCEDURE — 77030037032 HC INSRT SCIS CLICKLLINE DISP STOR -B: Performed by: SURGERY

## 2019-11-19 PROCEDURE — 77030006984: Performed by: SURGERY

## 2019-11-19 PROCEDURE — 77030012770 HC TRCR OPT FX AMR -B: Performed by: SURGERY

## 2019-11-19 PROCEDURE — 77030008771 HC TU NG SALEM SUMP -A: Performed by: ANESTHESIOLOGY

## 2019-11-19 PROCEDURE — 82150 ASSAY OF AMYLASE: CPT

## 2019-11-19 PROCEDURE — 77030009967 HC RELD STPLR ENDOSC J&J -C: Performed by: SURGERY

## 2019-11-19 PROCEDURE — 77030009403 HC ELECTRD ENDO MEGA -B: Performed by: SURGERY

## 2019-11-19 PROCEDURE — 51798 US URINE CAPACITY MEASURE: CPT

## 2019-11-19 PROCEDURE — 74011000250 HC RX REV CODE- 250: Performed by: NURSE ANESTHETIST, CERTIFIED REGISTERED

## 2019-11-19 PROCEDURE — 77010033678 HC OXYGEN DAILY

## 2019-11-19 PROCEDURE — 80076 HEPATIC FUNCTION PANEL: CPT

## 2019-11-19 PROCEDURE — 77030008684 HC TU ET CUF COVD -B: Performed by: ANESTHESIOLOGY

## 2019-11-19 PROCEDURE — 74011000250 HC RX REV CODE- 250: Performed by: STUDENT IN AN ORGANIZED HEALTH CARE EDUCATION/TRAINING PROGRAM

## 2019-11-19 PROCEDURE — 77030020263 HC SOL INJ SOD CL0.9% LFCR 1000ML: Performed by: SURGERY

## 2019-11-19 PROCEDURE — 77030013079 HC BLNKT BAIR HGGR 3M -A: Performed by: ANESTHESIOLOGY

## 2019-11-19 PROCEDURE — 77030013252 HC APPL DUPLOSPRY BAXT -B: Performed by: SURGERY

## 2019-11-19 PROCEDURE — 87205 SMEAR GRAM STAIN: CPT

## 2019-11-19 PROCEDURE — 74011636637 HC RX REV CODE- 636/637: Performed by: STUDENT IN AN ORGANIZED HEALTH CARE EDUCATION/TRAINING PROGRAM

## 2019-11-19 PROCEDURE — 77030040922 HC BLNKT HYPOTHRM STRY -A

## 2019-11-19 PROCEDURE — 74011250637 HC RX REV CODE- 250/637: Performed by: NURSE ANESTHETIST, CERTIFIED REGISTERED

## 2019-11-19 PROCEDURE — 77030031139 HC SUT VCRL2 J&J -A: Performed by: SURGERY

## 2019-11-19 PROCEDURE — 77030027876 HC STPLR ENDOSC FLX PWR J&J -G1: Performed by: SURGERY

## 2019-11-19 PROCEDURE — 36415 COLL VENOUS BLD VENIPUNCTURE: CPT

## 2019-11-19 PROCEDURE — 77030020747 HC TU INSUF ENDOSC TELE -A: Performed by: SURGERY

## 2019-11-19 PROCEDURE — 77030018315 HC SEAL FBRN TISSL10ML BAXT -F: Performed by: SURGERY

## 2019-11-19 PROCEDURE — 77030008608 HC TRCR ENDOSC SMTH AMR -B: Performed by: SURGERY

## 2019-11-19 PROCEDURE — 77030040506 HC DRN WND MDII -A: Performed by: SURGERY

## 2019-11-19 PROCEDURE — 77030039266 HC ADH SKN EXOFIN S2SG -A: Performed by: SURGERY

## 2019-11-19 PROCEDURE — 77030027138 HC INCENT SPIROMETER -A

## 2019-11-19 PROCEDURE — 77030002895 HC DEV VASC CLOSR COVD -B: Performed by: SURGERY

## 2019-11-19 PROCEDURE — 0FT44ZZ RESECTION OF GALLBLADDER, PERCUTANEOUS ENDOSCOPIC APPROACH: ICD-10-PCS | Performed by: SURGERY

## 2019-11-19 RX ORDER — ONDANSETRON 2 MG/ML
4 INJECTION INTRAMUSCULAR; INTRAVENOUS AS NEEDED
Status: DISCONTINUED | OUTPATIENT
Start: 2019-11-19 | End: 2019-11-19 | Stop reason: HOSPADM

## 2019-11-19 RX ORDER — FENTANYL CITRATE 50 UG/ML
INJECTION, SOLUTION INTRAMUSCULAR; INTRAVENOUS AS NEEDED
Status: DISCONTINUED | OUTPATIENT
Start: 2019-11-19 | End: 2019-11-19 | Stop reason: HOSPADM

## 2019-11-19 RX ORDER — LIDOCAINE HYDROCHLORIDE 20 MG/ML
INJECTION, SOLUTION EPIDURAL; INFILTRATION; INTRACAUDAL; PERINEURAL AS NEEDED
Status: DISCONTINUED | OUTPATIENT
Start: 2019-11-19 | End: 2019-11-19 | Stop reason: HOSPADM

## 2019-11-19 RX ORDER — MORPHINE SULFATE 2 MG/ML
2 INJECTION, SOLUTION INTRAMUSCULAR; INTRAVENOUS
Status: DISCONTINUED | OUTPATIENT
Start: 2019-11-19 | End: 2019-11-19

## 2019-11-19 RX ORDER — NEOSTIGMINE METHYLSULFATE 1 MG/ML
INJECTION, SOLUTION INTRAVENOUS AS NEEDED
Status: DISCONTINUED | OUTPATIENT
Start: 2019-11-19 | End: 2019-11-19 | Stop reason: HOSPADM

## 2019-11-19 RX ORDER — SODIUM CHLORIDE 0.9 % (FLUSH) 0.9 %
5-40 SYRINGE (ML) INJECTION AS NEEDED
Status: DISCONTINUED | OUTPATIENT
Start: 2019-11-19 | End: 2019-11-19 | Stop reason: HOSPADM

## 2019-11-19 RX ORDER — SODIUM CHLORIDE 0.9 % (FLUSH) 0.9 %
5-40 SYRINGE (ML) INJECTION EVERY 8 HOURS
Status: DISCONTINUED | OUTPATIENT
Start: 2019-11-19 | End: 2019-11-19 | Stop reason: HOSPADM

## 2019-11-19 RX ORDER — GLYCOPYRROLATE 0.2 MG/ML
INJECTION INTRAMUSCULAR; INTRAVENOUS AS NEEDED
Status: DISCONTINUED | OUTPATIENT
Start: 2019-11-19 | End: 2019-11-19 | Stop reason: HOSPADM

## 2019-11-19 RX ORDER — DEXAMETHASONE SODIUM PHOSPHATE 4 MG/ML
INJECTION, SOLUTION INTRA-ARTICULAR; INTRALESIONAL; INTRAMUSCULAR; INTRAVENOUS; SOFT TISSUE AS NEEDED
Status: DISCONTINUED | OUTPATIENT
Start: 2019-11-19 | End: 2019-11-19 | Stop reason: HOSPADM

## 2019-11-19 RX ORDER — SODIUM CHLORIDE 0.9 % (FLUSH) 0.9 %
5-40 SYRINGE (ML) INJECTION EVERY 8 HOURS
Status: DISCONTINUED | OUTPATIENT
Start: 2019-11-19 | End: 2019-11-25 | Stop reason: HOSPADM

## 2019-11-19 RX ORDER — LIDOCAINE HYDROCHLORIDE 10 MG/ML
0.1 INJECTION, SOLUTION EPIDURAL; INFILTRATION; INTRACAUDAL; PERINEURAL AS NEEDED
Status: DISCONTINUED | OUTPATIENT
Start: 2019-11-19 | End: 2019-11-19 | Stop reason: HOSPADM

## 2019-11-19 RX ORDER — HYDROMORPHONE HYDROCHLORIDE 1 MG/ML
0.2 INJECTION, SOLUTION INTRAMUSCULAR; INTRAVENOUS; SUBCUTANEOUS
Status: DISCONTINUED | OUTPATIENT
Start: 2019-11-19 | End: 2019-11-19 | Stop reason: HOSPADM

## 2019-11-19 RX ORDER — PHENYLEPHRINE HCL IN 0.9% NACL 0.4MG/10ML
SYRINGE (ML) INTRAVENOUS AS NEEDED
Status: DISCONTINUED | OUTPATIENT
Start: 2019-11-19 | End: 2019-11-19 | Stop reason: HOSPADM

## 2019-11-19 RX ORDER — SCOLOPAMINE TRANSDERMAL SYSTEM 1 MG/1
PATCH, EXTENDED RELEASE TRANSDERMAL AS NEEDED
Status: DISCONTINUED | OUTPATIENT
Start: 2019-11-19 | End: 2019-11-19 | Stop reason: HOSPADM

## 2019-11-19 RX ORDER — ROCURONIUM BROMIDE 10 MG/ML
INJECTION, SOLUTION INTRAVENOUS AS NEEDED
Status: DISCONTINUED | OUTPATIENT
Start: 2019-11-19 | End: 2019-11-19 | Stop reason: HOSPADM

## 2019-11-19 RX ORDER — SUCCINYLCHOLINE CHLORIDE 20 MG/ML
INJECTION INTRAMUSCULAR; INTRAVENOUS AS NEEDED
Status: DISCONTINUED | OUTPATIENT
Start: 2019-11-19 | End: 2019-11-19 | Stop reason: HOSPADM

## 2019-11-19 RX ORDER — ESMOLOL HYDROCHLORIDE 10 MG/ML
INJECTION INTRAVENOUS AS NEEDED
Status: DISCONTINUED | OUTPATIENT
Start: 2019-11-19 | End: 2019-11-19 | Stop reason: HOSPADM

## 2019-11-19 RX ORDER — BUPIVACAINE HYDROCHLORIDE 5 MG/ML
INJECTION, SOLUTION EPIDURAL; INTRACAUDAL AS NEEDED
Status: DISCONTINUED | OUTPATIENT
Start: 2019-11-19 | End: 2019-11-19 | Stop reason: HOSPADM

## 2019-11-19 RX ORDER — SODIUM CHLORIDE, SODIUM LACTATE, POTASSIUM CHLORIDE, CALCIUM CHLORIDE 600; 310; 30; 20 MG/100ML; MG/100ML; MG/100ML; MG/100ML
25 INJECTION, SOLUTION INTRAVENOUS CONTINUOUS
Status: DISCONTINUED | OUTPATIENT
Start: 2019-11-19 | End: 2019-11-19 | Stop reason: HOSPADM

## 2019-11-19 RX ORDER — FENTANYL CITRATE 50 UG/ML
25 INJECTION, SOLUTION INTRAMUSCULAR; INTRAVENOUS
Status: COMPLETED | OUTPATIENT
Start: 2019-11-19 | End: 2019-11-19

## 2019-11-19 RX ORDER — SODIUM CHLORIDE 0.9 % (FLUSH) 0.9 %
5-40 SYRINGE (ML) INJECTION AS NEEDED
Status: DISCONTINUED | OUTPATIENT
Start: 2019-11-19 | End: 2019-11-25 | Stop reason: HOSPADM

## 2019-11-19 RX ORDER — MORPHINE SULFATE 2 MG/ML
1 INJECTION, SOLUTION INTRAMUSCULAR; INTRAVENOUS
Status: DISCONTINUED | OUTPATIENT
Start: 2019-11-19 | End: 2019-11-21

## 2019-11-19 RX ORDER — MIDAZOLAM HYDROCHLORIDE 1 MG/ML
INJECTION, SOLUTION INTRAMUSCULAR; INTRAVENOUS AS NEEDED
Status: DISCONTINUED | OUTPATIENT
Start: 2019-11-19 | End: 2019-11-19 | Stop reason: HOSPADM

## 2019-11-19 RX ORDER — LORAZEPAM 2 MG/ML
0.5 INJECTION INTRAMUSCULAR
Status: DISCONTINUED | OUTPATIENT
Start: 2019-11-19 | End: 2019-11-25 | Stop reason: HOSPADM

## 2019-11-19 RX ORDER — ONDANSETRON 2 MG/ML
INJECTION INTRAMUSCULAR; INTRAVENOUS AS NEEDED
Status: DISCONTINUED | OUTPATIENT
Start: 2019-11-19 | End: 2019-11-19 | Stop reason: HOSPADM

## 2019-11-19 RX ORDER — PROPOFOL 10 MG/ML
INJECTION, EMULSION INTRAVENOUS AS NEEDED
Status: DISCONTINUED | OUTPATIENT
Start: 2019-11-19 | End: 2019-11-19 | Stop reason: HOSPADM

## 2019-11-19 RX ADMIN — SODIUM CHLORIDE 10 ML: 9 INJECTION INTRAMUSCULAR; INTRAVENOUS; SUBCUTANEOUS at 21:52

## 2019-11-19 RX ADMIN — SODIUM CHLORIDE, SODIUM LACTATE, POTASSIUM CHLORIDE, AND CALCIUM CHLORIDE 200 ML/HR: 600; 310; 30; 20 INJECTION, SOLUTION INTRAVENOUS at 21:01

## 2019-11-19 RX ADMIN — HUMAN INSULIN 3 UNITS: 100 INJECTION, SOLUTION SUBCUTANEOUS at 21:47

## 2019-11-19 RX ADMIN — PIPERACILLIN AND TAZOBACTAM 3.38 G: 3; .375 INJECTION, POWDER, FOR SOLUTION INTRAVENOUS at 15:45

## 2019-11-19 RX ADMIN — INSULIN GLARGINE 30 UNITS: 100 INJECTION, SOLUTION SUBCUTANEOUS at 09:29

## 2019-11-19 RX ADMIN — FENTANYL CITRATE 50 MCG: 50 INJECTION, SOLUTION INTRAMUSCULAR; INTRAVENOUS at 12:15

## 2019-11-19 RX ADMIN — Medication 2 MG: at 14:03

## 2019-11-19 RX ADMIN — PIPERACILLIN AND TAZOBACTAM 3.38 G: 3; .375 INJECTION, POWDER, FOR SOLUTION INTRAVENOUS at 23:18

## 2019-11-19 RX ADMIN — ONDANSETRON HYDROCHLORIDE 4 MG: 2 INJECTION, SOLUTION INTRAMUSCULAR; INTRAVENOUS at 13:08

## 2019-11-19 RX ADMIN — PROPOFOL 170 MG: 10 INJECTION, EMULSION INTRAVENOUS at 12:15

## 2019-11-19 RX ADMIN — SUCCINYLCHOLINE CHLORIDE 180 MG: 20 INJECTION, SOLUTION INTRAMUSCULAR; INTRAVENOUS at 12:15

## 2019-11-19 RX ADMIN — ONDANSETRON 4 MG: 2 INJECTION INTRAMUSCULAR; INTRAVENOUS at 23:18

## 2019-11-19 RX ADMIN — MIDAZOLAM 2 MG: 1 INJECTION INTRAMUSCULAR; INTRAVENOUS at 12:17

## 2019-11-19 RX ADMIN — CARVEDILOL 6.25 MG: 6.25 TABLET, FILM COATED ORAL at 17:19

## 2019-11-19 RX ADMIN — Medication 80 MCG: at 13:34

## 2019-11-19 RX ADMIN — PIPERACILLIN AND TAZOBACTAM 3.38 G: 3; .375 INJECTION, POWDER, FOR SOLUTION INTRAVENOUS at 07:12

## 2019-11-19 RX ADMIN — ARFORMOTEROL TARTRATE 15 MCG: 15 SOLUTION RESPIRATORY (INHALATION) at 07:09

## 2019-11-19 RX ADMIN — LIDOCAINE HYDROCHLORIDE 30 MG: 20 INJECTION, SOLUTION EPIDURAL; INFILTRATION; INTRACAUDAL; PERINEURAL at 12:14

## 2019-11-19 RX ADMIN — MORPHINE SULFATE 1 MG: 2 INJECTION, SOLUTION INTRAMUSCULAR; INTRAVENOUS at 21:01

## 2019-11-19 RX ADMIN — MORPHINE SULFATE 1 MG: 2 INJECTION, SOLUTION INTRAMUSCULAR; INTRAVENOUS at 04:44

## 2019-11-19 RX ADMIN — SODIUM CHLORIDE, SODIUM LACTATE, POTASSIUM CHLORIDE, AND CALCIUM CHLORIDE 200 ML/HR: 600; 310; 30; 20 INJECTION, SOLUTION INTRAVENOUS at 15:33

## 2019-11-19 RX ADMIN — ROCURONIUM BROMIDE 10 MG: 10 SOLUTION INTRAVENOUS at 12:58

## 2019-11-19 RX ADMIN — PRAVASTATIN SODIUM 40 MG: 40 TABLET ORAL at 21:52

## 2019-11-19 RX ADMIN — MORPHINE SULFATE 1 MG: 2 INJECTION, SOLUTION INTRAMUSCULAR; INTRAVENOUS at 09:25

## 2019-11-19 RX ADMIN — ARFORMOTEROL TARTRATE 15 MCG: 15 SOLUTION RESPIRATORY (INHALATION) at 20:27

## 2019-11-19 RX ADMIN — MORPHINE SULFATE 1 MG: 2 INJECTION, SOLUTION INTRAMUSCULAR; INTRAVENOUS at 17:20

## 2019-11-19 RX ADMIN — ROCURONIUM BROMIDE 25 MG: 10 SOLUTION INTRAVENOUS at 12:28

## 2019-11-19 RX ADMIN — FENOFIBRATE 145 MG: 145 TABLET ORAL at 09:00

## 2019-11-19 RX ADMIN — MORPHINE SULFATE 1 MG: 2 INJECTION, SOLUTION INTRAMUSCULAR; INTRAVENOUS at 08:03

## 2019-11-19 RX ADMIN — PROPOFOL 25 MG: 10 INJECTION, EMULSION INTRAVENOUS at 14:07

## 2019-11-19 RX ADMIN — ONDANSETRON 4 MG: 2 INJECTION INTRAMUSCULAR; INTRAVENOUS at 17:20

## 2019-11-19 RX ADMIN — DEXAMETHASONE SODIUM PHOSPHATE 4 MG: 4 INJECTION, SOLUTION INTRAMUSCULAR; INTRAVENOUS at 13:08

## 2019-11-19 RX ADMIN — GLYCOPYRROLATE 0.4 MG: 0.2 INJECTION, SOLUTION INTRAMUSCULAR; INTRAVENOUS at 14:03

## 2019-11-19 RX ADMIN — MORPHINE SULFATE 1 MG: 2 INJECTION, SOLUTION INTRAMUSCULAR; INTRAVENOUS at 23:16

## 2019-11-19 RX ADMIN — BUDESONIDE 500 MCG: 0.5 INHALANT RESPIRATORY (INHALATION) at 07:09

## 2019-11-19 RX ADMIN — ROCURONIUM BROMIDE 5 MG: 10 SOLUTION INTRAVENOUS at 12:15

## 2019-11-19 RX ADMIN — SCOPALAMINE 1 PATCH: 1 PATCH, EXTENDED RELEASE TRANSDERMAL at 12:06

## 2019-11-19 RX ADMIN — Medication 10 ML: at 06:00

## 2019-11-19 RX ADMIN — FENTANYL CITRATE 25 MCG: 50 INJECTION INTRAMUSCULAR; INTRAVENOUS at 15:26

## 2019-11-19 RX ADMIN — FENTANYL CITRATE 25 MCG: 50 INJECTION INTRAMUSCULAR; INTRAVENOUS at 15:32

## 2019-11-19 RX ADMIN — MORPHINE SULFATE 1 MG: 2 INJECTION, SOLUTION INTRAMUSCULAR; INTRAVENOUS at 06:35

## 2019-11-19 RX ADMIN — Medication 80 MCG: at 13:03

## 2019-11-19 RX ADMIN — FENTANYL CITRATE 25 MCG: 50 INJECTION INTRAMUSCULAR; INTRAVENOUS at 14:35

## 2019-11-19 RX ADMIN — HUMAN INSULIN 2 UNITS: 100 INJECTION, SOLUTION SUBCUTANEOUS at 17:19

## 2019-11-19 RX ADMIN — Medication 120 MCG: at 12:20

## 2019-11-19 RX ADMIN — MORPHINE SULFATE 1 MG: 2 INJECTION, SOLUTION INTRAMUSCULAR; INTRAVENOUS at 19:25

## 2019-11-19 RX ADMIN — BUDESONIDE 500 MCG: 0.5 INHALANT RESPIRATORY (INHALATION) at 20:27

## 2019-11-19 RX ADMIN — FENTANYL CITRATE 25 MCG: 50 INJECTION INTRAMUSCULAR; INTRAVENOUS at 15:04

## 2019-11-19 RX ADMIN — SODIUM CHLORIDE 10 ML: 9 INJECTION INTRAMUSCULAR; INTRAVENOUS; SUBCUTANEOUS at 06:00

## 2019-11-19 RX ADMIN — ONDANSETRON 4 MG: 2 INJECTION INTRAMUSCULAR; INTRAVENOUS at 04:50

## 2019-11-19 RX ADMIN — MORPHINE SULFATE 2 MG: 2 INJECTION, SOLUTION INTRAMUSCULAR; INTRAVENOUS at 01:35

## 2019-11-19 RX ADMIN — Medication 80 MCG: at 13:18

## 2019-11-19 RX ADMIN — ESMOLOL HYDROCHLORIDE 20 MG: 10 INJECTION, SOLUTION INTRAVENOUS at 12:18

## 2019-11-19 NOTE — PROGRESS NOTES
Omayra Verma 272  174 [facility], [address]       GI PROGRESS NOTE  [place]  [phone] office  [phone] NP/PA in-hospital cell phone M-F until 4:30PM  After 5PM or on weekends, please call  for physician on call      NAME: Lakhwinder Harvey   :  1940   MRN:  [id number]       Subjective:   Patient had lap jb today. She complains of nausea and abdominal pain. Objective:     VITALS:   Last 24hrs VS reviewed since prior progress note. Most recent are:  Visit Vitals  /75   Pulse 99   Temp 97.7 °F (36.5 °C)   Resp 15   Ht 5' 4\" (1.626 m)   Wt 90.7 kg (200 lb)   SpO2 95%   BMI 34.33 kg/m²       PHYSICAL EXAM:  General: Cooperative, no acute distress    Neurologic:  Alert and oriented  HEENT: EOMI, no scleral icterus   Lungs:  CTA bilaterally anteriorly  Heart:  S1 S2  Abdomen: Soft, mildly distended, generalized tenderness. Hypoactive bowel sounds. Extremities: Warm  Psych:   Not anxious or agitated    Lab Data Reviewed:     Recent Results (from the past 24 hour(s))   GLUCOSE, POC    Collection Time: 19  6:23 PM   Result Value Ref Range    Glucose (POC) 147 (H) 65 - 100 mg/dL    Performed by Olivia Reese    GLUCOSE, POC    Collection Time: 19  9:18 PM   Result Value Ref Range    Glucose (POC) 150 (H) 65 - 100 mg/dL    Performed by Olivia Reese    HEPATIC FUNCTION PANEL    Collection Time: 19  1:58 AM   Result Value Ref Range    Protein, total 6.2 (L) 6.4 - 8.2 g/dL    Albumin 2.7 (L) 3.5 - 5.0 g/dL    Globulin 3.5 2.0 - 4.0 g/dL    A-G Ratio 0.8 (L) 1.1 - 2.2      Bilirubin, total 1.1 (H) 0.2 - 1.0 MG/DL    Bilirubin, direct 0.6 (H) 0.0 - 0.2 MG/DL    Alk.  phosphatase 83 45 - 117 U/L    AST (SGOT) 56 (H) 15 - 37 U/L    ALT (SGPT) 126 (H) 12 - 78 U/L   LIPASE    Collection Time: 19  1:58 AM   Result Value Ref Range    Lipase 40 (L) 73 - 393 U/L   AMYLASE    Collection Time: 19  1:58 AM   Result Value Ref Range    Amylase 14 (L) 25 - 115 U/L   GLUCOSE, POC    Collection Time: 11/19/19  6:28 AM   Result Value Ref Range    Glucose (POC) 136 (H) 65 - 100 mg/dL    Performed by Abelardo Dance    GLUCOSE, POC    Collection Time: 11/19/19 11:34 AM   Result Value Ref Range    Glucose (POC) 120 (H) 65 - 100 mg/dL    Performed by Sherly Caceres    GLUCOSE, POC    Collection Time: 11/19/19  2:47 PM   Result Value Ref Range    Glucose (POC) 140 (H) 65 - 100 mg/dL    Performed by Shankar Mcconnell         Assessment:   · Epigastric and RUQ abdominal pain: MRI/MRCP (11/18/19): distended gallbladder with multiple stones and sludge; findings consistent with acute cholecystitis; mildly dilated common bile duct with questionable small stones versus sludge in the distal common bile duct. · Choledocholithiasis: Status post ERCP (11/18/19): innumerable stones and sludge with biliary sphincterotomy, balloon extraction, and basket lithotripsy; placement of plastic biliary stent. AST: 56, ALT: 126, alkaline phosphatase: 83, total bilirubin: 1.1, amylase: 14, lipase: 40.   · Acute cholecystitis: status post laparoscopic cholecystectomy (11/19/19) by  [facility]. Patient Active Problem List   Diagnosis Code    DM (diabetes mellitus) ([facility] [address] E11.9    Hyperlipidemia E78.5    HTN (hypertension) I10    Chronic low back pain M54.5, G89.29    Depression F32.9    Vitamin D deficiency E55.9    COPD exacerbation (HCC) J44.1    Dizziness and giddiness R42    Dilation of common bile duct K83.8     Plan:   · Monitor LFTs  · Continue supportive measures  · Postoperative management per surgery  · Repeat ERCP in 4 weeks following recovery from cholecystectomy     Signed By: Melvern Cranker, [place]     11/19/2019  3:31 PM       GI Attending: Agree with above. She is status post lap cholecystectomy today. Plan for office follow up in 4 weeks at which time we will plan for repeat ERCP and biliary stent removal. Will sign off for now.  Please call with any questions. Anshul Fountain MD

## 2019-11-19 NOTE — ROUTINE PROCESS
TRANSFER - IN REPORT: 
 
Verbal report received from [facility] RN on [employer]  being received from 502 for ordered procedure Report consisted of patients Situation, Background, Assessment and  
Recommendations(SBAR). Information from the following report(s) SBAR was reviewed with the receiving nurse. Opportunity for questions and clarification was provided. Assessment completed upon patients arrival to unit and care assumed.

## 2019-11-19 NOTE — PROGRESS NOTES
Problem: Pressure Injury - Risk of  Goal: *Prevention of pressure injury  Description  Document Fam Scale and appropriate interventions in the flowsheet. Outcome: Progressing Towards Goal  Note:   Pressure Injury Interventions:             Activity Interventions: Increase time out of bed    Mobility Interventions: Pressure redistribution bed/mattress (bed type)    Nutrition Interventions: Document food/fluid/supplement intake

## 2019-11-19 NOTE — CONSULTS
Patient seen at request of Dr. Raffaele Mistry.    Information obtained from patient and review of chart. Gilmar Leach is an 70 y.o. female who was recently admitted with abdominal pain. Ms. Belva Snellen tells me that she has been experiencing intermittent abdominal pain for several months now. Pain is \"crampy\" and localized to the epigastrium. The pain became worse over the past week and constant. Associate nausea and vomitting. No fevers or chills. She reports no shortness of breath or chest pain. She has otherwise been in her usual state of health. CT scan abdomen/pelvis with IV contrast - 11/16/2019 - New choledocholithiasis and CBD dilatation since 2013. Consider ERCP. MRCP is  unlikely to yield additional information. Cholelithiasis. No acute cholecystitis. Bilateral renal scarring and multiple renal cysts. No CT evidence of pyelonephritis. No hydronephrosis. MRCP - 11/18/2019 - Distended gallbladder with multiple stones and sludge. Pericholecystic fluid  and gallbladder wall thickening consistent with acute cholecystitis. Common bile duct measures 11 mm and is mildly dilated. There are questionable small stones versus sludge in the distal common bile duct. Multiple bilateral renal cysts. ERCP - 11/18/2019 - Sphincterotomy and clearance of innumerable stones and sludge with a combination of biliary stone extraction and basket lithotripsy. Placement of biliary stent. Allergies - Codeine; Crestor [rosuvastatin]; Lipitor [atorvastatin]; and Tetanus vaccines and toxoid    Meds - Reviewed.     PMH -   Past Medical History:   Diagnosis Date    Anemia NEC     Arthritis     Asthma     CAD (coronary artery disease)     NSTEMI following PCI    Calculus of kidney 7-    [facility]    Cancer ([facility] [address]     skin    Chronic pain     degenerative disc disease, lower right back    COPD     Depression     Diabetes ([facility] [address]     GERD (gastroesophageal reflux disease)     Hx of mammogram 11/29/2017    [facility] - No mammographic evidence of malignancy - annual follow up recommended     Hypertension     2005 Dx    Joint pain     Morbid obesity ([address]     Psychiatric disorder     depression    Sleep disorder      PSH -   Past Surgical History:   Procedure Laterality Date    CARDIAC SURG PROCEDURE UNLIST      stents    COLONOSCOPY N/A 2019    COLONOSCOPY performed [facility]ki [facility] at [facility]    [address]  2019         HX APPENDECTOMY      in 30's    HX CORONARY STENT PLACEMENT  dec 2013    HX HYSTERECTOMY      HX LITHOTRIPSY      HX TRABECULECTOMY      HX TUBAL LIGATION      UPPER GI ENDOSCOPY,BIOPSY  2019          Fam Hx -   Family History   Problem Relation Age of Onset    Arthritis-rheumatoid Sister     Osteoporosis Sister     Diabetes Brother     Stroke Brother     Elevated Lipids Mother     Alcohol abuse Father     Elevated Lipids Father     Cancer Sister     Diabetes Sister     Cancer Sister     Diabetes Brother     Diabetes Brother     Heart Disease Brother     Diabetes Brother     Diabetes Brother     Heart Disease Brother     Diabetes Maternal Aunt      Soc Hx -   Social History     Tobacco Use    Smoking status: Former Smoker     Packs/day: 1.00     Years: 45.00     Pack years: 45.00     Last attempt to quit: 2013     Years since quittin.8    Smokeless tobacco: Never Used   Substance Use Topics    Alcohol use: No     Patient is a well developed, well nourished female in no acute distress. Visit Vitals  /79 Comment: rn reassess   Pulse (!) 102   Temp 97.6 °F (36.4 °C)   Resp 17   Ht 5' 4\" (1.626 m)   Wt 200 lb (90.7 kg)   SpO2 93%   BMI 34.33 kg/m²     HEENT: Anicteric. Neck: Supple without palpable lymphadenopathy. Cor: RRR. Lungs: Clear to auscultation bilaterally. Abd: Soft. Distended. Epigastric tenderness. No guarding or rebound. Ext: No edema.   Neuro: Grossly Non focal.     Labs -   Recent Results (from the past 24 hour(s))   GLUCOSE, POC    Collection Time: 11/17/19  9:47 PM   Result Value Ref Range    Glucose (POC) 201 (H) 65 - 100 mg/dL    Performed by Evelyn Murrell    HEPATIC FUNCTION PANEL    Collection Time: 11/18/19  3:41 AM   Result Value Ref Range    Protein, total 6.0 (L) 6.4 - 8.2 g/dL    Albumin 3.3 (L) 3.5 - 5.0 g/dL    Globulin 2.7 2.0 - 4.0 g/dL    A-G Ratio 1.2 1.1 - 2.2      Bilirubin, total 1.3 (H) 0.2 - 1.0 MG/DL    Bilirubin, direct 0.8 (H) 0.0 - 0.2 MG/DL    Alk. phosphatase 77 45 - 117 U/L    AST (SGOT) 137 (H) 15 - 37 U/L    ALT (SGPT) 182 (H) 12 - 78 U/L   GLUCOSE, POC    Collection Time: 11/18/19  6:51 AM   Result Value Ref Range    Glucose (POC) 194 (H) 65 - 100 mg/dL    Performed by [address] [place] [address], POC    Collection Time: 11/18/19 11:30 AM   Result Value Ref Range    Glucose (POC) 215 (H) 65 - 100 mg/dL    Performed by [address] [place] [address], POC    Collection Time: 11/18/19  6:23 PM   Result Value Ref Range    Glucose (POC) 147 (H) 65 - 100 mg/dL    Performed by Edrick Closs      Imaging studies - Reviewed. Imp: Ms. Jared Fish is a 70 y.o. female with choledocholithiasis s/p ERCP with clearance of CBD and placement of biliary stent. Plan: 1. Ms. Jared Fish will benefit from cholecystectomy. Discussed laparoscopic cholecystectomy and intra-operative cholangiogram with her including risks of bleeding, infection, CBD injury, conversion to open procedure. Also explained to her that one of my associates may do the procedure. She understands and wishes to proceed. 2. Needs consent. 3. Tentatively to OR 11/19/2019.   4. IVF at 200ml/hour. 5. NPO after midnight. 6. Labs in AM.    7. Pain medication and anti-emetics as needed.     8. Plans per Dr. Dinah Dodge.

## 2019-11-19 NOTE — PERIOP NOTES
Called surgical waiting area to inform family of start of procedure. No family in waiting area. Asked volunteers to inform family upon arrival to waiting area.

## 2019-11-19 NOTE — PROGRESS NOTES
Bedside shift change report given to [address] (oncoming nurse) by Mariana Cochran RN (offgoing nurse). Report included the following information SBAR, Kardex, Intake/Output and MAR.

## 2019-11-19 NOTE — PERIOP NOTES
TRANSFER - OUT REPORT:    Verbal report given Sanjuana on Abelardo Flower  being transferred to 502 for routine post - op       Report consisted of patients Situation, Background, Assessment and   Recommendations(SBAR). Time Pre op antibiotic given:scheduled  Anesthesia Stop time: 4267  Rhodes Present on Transfer to floor:no  Order for Rhodes on Chart:no  Discharge Prescriptions with Chart:no    Information from the following report(s) SBAR, OR Summary, Intake/Output and MAR was reviewed with the receiving nurse. Opportunity for questions and clarification was provided. Is the patient on 02? YES       L/Min 3       Other     Is the patient on a monitor? NO    Is the nurse transporting with the patient? NO    Surgical Waiting Area notified of patient's transfer from PACU? YES      The following personal items collected during your admission accompanied patient upon transfer:   Dental Appliance: Dental Appliances: (bridge)  Vision: Visual Aid: With patient, Glasses  Hearing Aid:    Jewelry: Jewelry: None  Clothing: Clothing: At bedside  Other Valuables:  Other Valuables: Cell Phone  Valuables sent to safe:

## 2019-11-19 NOTE — BRIEF OP NOTE
BRIEF OPERATIVE NOTE    Date of Procedure: 11/19/2019   Preoperative Diagnosis: Acute cholecystitis  Postoperative Diagnosis: Acute cholecystitis  Procedure(s):  CHOLECYSTECTOMY LAPAROSCOPIC  Surgeon(s) and Role:     Brady Delgadillo MD - Primary         Surgical Assistant: Dean Bal    Surgical Staff:  Sean Lr: Regi Smith, MAXIMILIANO; Deepak Funes RN  Scrub Tech-1: Akash Rucker;  Charleston Ortiz  Surg Asst-1: Faustina Chavez  Event Time In Time Out   Incision Start 11/19/2019 1229    Incision Close 11/19/2019 1400      Anesthesia: General   Estimated Blood Loss: 250 cc  Specimens:   ID Type Source Tests Collected by Time Destination   1 : gallbladder Fresh Gallbladder  Kleber Acosta MD 11/19/2019 1249 Pathology   1 : Gallbladder fluid Body Fluid Gallbladder CULTURE, ANAEROBIC Kleber Acosta MD 11/19/2019 1340 Microbiology      Findings: gangrenous cholecystitis   Complications: none  Implants: * No implants in log *

## 2019-11-19 NOTE — PROGRESS NOTES
PT orders received and chart reviewed. Pt initially pleasant but when she was encouraged to be out of bed immediately becomes upset citing inability to perform any therapy due to pain in her gallbladder. Attempted to educate pt that therapy will consist of tasks like toilet transfers and light mobility, pt continues to decline. Will attempt later as schedule allows.     Wilfrido Odell, DPT, PT

## 2019-11-19 NOTE — PERIOP NOTES
Tisseel placed at end of procedure by surgeon. Lot # Elfida Bradley placed at end of procedure by surgeon.  Lot # [id number] Exp- 97-

## 2019-11-19 NOTE — PROGRESS NOTES
Hospitalist Progress Note  Antoinette Beebe MD  Answering service: [phone] from in house phone      Date of Service:  2019  NAME:  Yahaira Pearson  :  1940  MRN:  [id number]    Admission Summary:   79F p/w Abdominal pain for several weeks, worse last week. Interval history / Subjective:   Patient seen and examined at bedside. Feels better today, though still describes sig abdominal pain, going for Lap jb soon. Will keep analgesia as is. Re-evaluate after surgery. Some nausea. No vomiting. No fever. Assessment & Plan:     choledocholithiasis and CBD dilatation  - mild leukocytosis , liver enzymes and lipase WNL  - CT revealed choledocholithiasis and CBD dilation , no cholecystitis  - analgesics and antiemetics- NPO- GI following: s/p MRCP: likely cholecystitis  - IVF, zosyn empirically. - S/p ERCP : multiple stones and sludge removed, stent placed. - Surgery following: plans for Lap jb . Very anxious will start small prn ativan dose     CAD s/p PCI and stents  - Continue home ASA, BB ACE and studies  Hypertension Continue home meds- Hydralazine as needed. DM2: with hyperglycemia- Hold oral meds- home insulin- SSI- Accu-Checks   COPD No exacerbation- BT as needed     Patient's Baseline: Ambulates with walking  DVT ppx: SCD  Code status: Full  Disposition: TBD  Care plan discussed with patient/Family and nurse. Hospital Problems  Date Reviewed: 2019          Codes Class Noted POA    Dilation of common bile duct ICD-10-CM: K83.8  ICD-9-CM: 576.8  2019 Unknown            Review of Systems:   Pertinent items are mentioned in interval history. Vital Signs:    Last 24hrs VS reviewed since prior progress note.  Most recent are:  Visit Vitals  /58   Pulse 94   Temp 97.6 °F (36.4 °C)   Resp 18   Ht 5' 4\" (1.626 m)   Wt 90.7 kg (200 lb)   SpO2 100%   BMI 34.33 kg/m²         Intake/Output Summary (Last 24 hours) at 11/19/2019 1046  Last data filed at 11/18/2019 2025  Gross per 24 hour   Intake 600 ml   Output 250 ml   Net 350 ml        Physical Examination:   General:  Alert, oriented, distress with pain  Resp:  No accessory muscle use, Good sats  Abd:  Soft, tender++ RUQ, non-distended, BS+  Extremities:  No cyanosis or clubbing, no significant edema  Neuro:  Grossly normal, no focal neuro deficits, follows commands   Psych:  Good insight, AAOx3, not agitated. Data Review:    Review and/or order of clinical lab test  Review and/or order of tests in the radiology section of [facility]  Review and/or order of tests in the medicine section of [facility]  Labs:     Recent Labs     11/17/19  0242 11/16/19  1816   WBC 13.8* 5.9   HGB 12.8 12.2   HCT 38.7 37.2    197     Recent Labs     11/17/19  0242 11/16/19  1816    140   K 3.6 3.9    104   CO2 29 34*   BUN 17 16   CREA 1.18* 1.13*   * 204*   CA 9.8 10.1   MG  --  1.3*     Recent Labs     11/19/19  0158 11/18/19  0341 11/17/19  1334 11/16/19  1816   SGOT 56* 137* 91* 14*   * 182* 96* 20   AP 83 77 65 76   TBILI 1.1* 1.3* 0.7 0.4   TP 6.2* 6.0* 6.3* 6.5   ALB 2.7* 3.3* 3.4* 3.8   GLOB 3.5 2.7 2.9 2.7   AML 14*  --  32  --    LPSE 40*  --  90 153     No results for input(s): INR, PTP, APTT, INREXT, INREXT in the last 72 hours. No results for input(s): FE, TIBC, PSAT, FERR in the last 72 hours. No results found for: FOL, RBCF   No results for input(s): PH, PCO2, PO2 in the last 72 hours. No results for input(s): CPK, CKNDX, TROIQ in the last 72 hours.     No lab exists for component: CPKMB  Lab Results   Component Value Date/Time    Cholesterol, total 191 09/12/2015 09:18 AM    HDL Cholesterol 45 09/12/2015 09:18 AM    LDL,Direct 121 (H) 07/22/2015 04:00 AM    LDL, calculated 109 (H) 09/12/2015 09:18 AM    Triglyceride 186 (H) 09/12/2015 09:18 AM    CHOL/HDL Ratio 5.3 (H) 07/22/2015 04:00 AM     Lab Results   Component Value Date/Time    Glucose (POC) 136 (H) 11/19/2019 06:28 AM    Glucose (POC) 150 (H) 11/18/2019 09:18 PM    Glucose (POC) 147 (H) 11/18/2019 06:23 PM    Glucose (POC) 215 (H) 11/18/2019 11:30 AM    Glucose (POC) 194 (H) 11/18/2019 06:51 AM     Lab Results   Component Value Date/Time    Color YELLOW/STRAW 11/16/2019 07:03 PM    Appearance CLOUDY (A) 11/16/2019 07:03 PM    Specific gravity 1.012 11/16/2019 07:03 PM    pH (UA) 7.5 11/16/2019 07:03 PM    Protein NEGATIVE  11/16/2019 07:03 PM    Glucose 100 (A) 11/16/2019 07:03 PM    Ketone NEGATIVE  11/16/2019 07:03 PM    Bilirubin NEGATIVE  11/16/2019 07:03 PM    Urobilinogen 1.0 11/16/2019 07:03 PM    Nitrites NEGATIVE  11/16/2019 07:03 PM    Leukocyte Esterase NEGATIVE  11/16/2019 07:03 PM    Epithelial cells FEW 11/16/2019 07:03 PM    Bacteria NEGATIVE  11/16/2019 07:03 PM    WBC 0-4 11/16/2019 07:03 PM    RBC 0-5 11/16/2019 07:03 PM     Medications Reviewed:     Current Facility-Administered Medications   Medication Dose Route Frequency    morphine injection 1 mg  1 mg IntraVENous Q1H PRN    sodium chloride (NS) flush 5-40 mL  5-40 mL IntraVENous Q8H    sodium chloride (NS) flush 5-40 mL  5-40 mL IntraVENous PRN    lactated Ringers infusion  200 mL/hr IntraVENous CONTINUOUS    piperacillin-tazobactam (ZOSYN) 3.375 g in 0.9% sodium chloride (MBP/ADV) 100 mL  3.375 g IntraVENous Q8H    aspirin chewable tablet 81 mg  81 mg Oral DAILY    carvedilol (COREG) tablet 6.25 mg  6.25 mg Oral BID WITH MEALS    DULoxetine (CYMBALTA) capsule 60 mg  60 mg Oral DAILY    ezetimibe (ZETIA) tablet 10 mg  10 mg Oral DAILY    fenofibrate nanocrystallized (TRICOR) tablet 145 mg  145 mg Oral DAILY    lisinopril (PRINIVIL, ZESTRIL) tablet 5 mg  5 mg Oral DAILY    insulin glargine (LANTUS) injection 30 Units  30 Units SubCUTAneous DAILY    loratadine (CLARITIN) tablet 10 mg  10 mg Oral DAILY PRN    fish oil-omega-3 fatty acids 340-1,000 mg capsule 1 Cap  1 Cap Oral DAILY    pravastatin (PRAVACHOL) tablet 40 mg  40 mg Oral QHS    therapeutic multivitamin (THERAGRAN) tablet 1 Tab  1 Tab Oral DAILY    sodium chloride (NS) flush 5-40 mL  5-40 mL IntraVENous Q8H    sodium chloride (NS) flush 5-40 mL  5-40 mL IntraVENous PRN    ondansetron (ZOFRAN) injection 4 mg  4 mg IntraVENous Q4H PRN    insulin regular (NOVOLIN R, HUMULIN R) injection   SubCUTAneous AC&HS    glucose chewable tablet 16 g  4 Tab Oral PRN    glucagon (GLUCAGEN) injection 1 mg  1 mg IntraMUSCular PRN    dextrose 10% infusion 0-250 mL  0-250 mL IntraVENous PRN    arformoterol (BROVANA) neb solution 15 mcg  15 mcg Nebulization BID RT    And    budesonide (PULMICORT) 500 mcg/2 ml nebulizer suspension  500 mcg Nebulization BID RT    hydrALAZINE (APRESOLINE) 20 mg/mL injection 20 mg  20 mg IntraVENous Q6H PRN   ______________________________________________________________________  EXPECTED LENGTH OF STAY: 2d 7h  ACTUAL LENGTH OF STAY:          3               Fanta Diego MD

## 2019-11-19 NOTE — PROGRESS NOTES
Patient still with upper abdominal pain (improved since ERCP). Recommended laparoscopic cholecystectomy. Technical aspects of procedure reviewed along with risks to include bleeding, infection, open procedure, bile duct injury, persistent symptoms. She understands and desires to proceed. All questions answered.

## 2019-11-19 NOTE — ANESTHESIA PREPROCEDURE EVALUATION
Anesthetic History   No history of anesthetic complications            Review of Systems / Medical History  Patient summary reviewed, nursing notes reviewed and pertinent labs reviewed    Pulmonary    COPD        Asthma        Neuro/Psych         Psychiatric history     Cardiovascular    Hypertension          CAD         GI/Hepatic/Renal     GERD    Renal disease: CRI and stones       Endo/Other    Diabetes: poorly controlled, type 2    Morbid obesity and arthritis     Other Findings              Physical Exam    Airway  Mallampati: III  TM Distance: 4 - 6 cm  Neck ROM: normal range of motion   Mouth opening: Normal     Cardiovascular    Rhythm: regular  Rate: normal         Dental    Dentition: Caps/crowns and Poor dentition     Pulmonary      Decreased breath sounds: bibasilar  Wheezes:bilateral         Abdominal  Abdominal exam normal       Other Findings   Comments: On O2         Anesthetic Plan    ASA: 3  Anesthesia type: general          Induction: Intravenous  Anesthetic plan and risks discussed with: Patient

## 2019-11-19 NOTE — PROGRESS NOTES
Occupational Therapy Note  11/19/2019    Occupational therapist following along with pt for evaluation. Pt with pain an anxiety limiting participation this morning. Will follow up with pt at later time as able/ appropriate.     Thank you,  CHRISTI Lane/DANITZA

## 2019-11-19 NOTE — PROGRESS NOTES
Rounded on [facility]ai patients and provided Anointing of the Sick at request of patient    Fr. Hugo Griggs

## 2019-11-19 NOTE — PROGRESS NOTES
Holding Physical therapy evaluation due to pt surgery today (planned lap jb). Will follow up with pt tomorrow.       Jose J Walker, MELLISAT, PT

## 2019-11-19 NOTE — PROGRESS NOTES
Bedside and Verbal shift change report given to Kate Coronel (oncoming nurse) by Monika Rosen (offgoing nurse). Report included the following information SBAR.

## 2019-11-19 NOTE — ANESTHESIA POSTPROCEDURE EVALUATION
Post-Anesthesia Evaluation and Assessment    Patient: Shonda Johnson MRN: [id number]  SSN: [id number]    YOB: 1940  Age: 70 y.o. Sex: female      I have evaluated the patient and they are stable and ready for discharge from the PACU. Cardiovascular Function/Vital Signs  Visit Vitals  /64   Pulse 98   Temp 36.5 °C (97.7 °F)   Resp 19   Ht 5' 4\" (1.626 m)   Wt 90.7 kg (200 lb)   SpO2 98%   BMI 34.33 kg/m²       Patient is status post General anesthesia for Procedure(s):  CHOLECYSTECTOMY LAPAROSCOPIC. Nausea/Vomiting: None    Postoperative hydration reviewed and adequate. Pain:  Pain Scale 1: Numeric (0 - 10) (11/19/19 1435)  Pain Intensity 1: 5 (11/19/19 1435)   Managed    Neurological Status:   Neuro (WDL): Exceptions to WDL (11/19/19 1416)  Neuro  Neurologic State: Anesthetized (11/19/19 1416)   At baseline    Mental Status, Level of Consciousness: Alert and  oriented to person, place, and time    Pulmonary Status:   O2 Device: CO2 nasal cannula (11/19/19 1421)   Adequate oxygenation and airway patent    Complications related to anesthesia: None    Post-anesthesia assessment completed.  No concerns    Signed By: Nathalie Suggs MD     November 19, 2019

## 2019-11-19 NOTE — PROGRESS NOTES
Patient states that pain is well controlled, will continue to monitor. Bedside and Verbal shift change report given to [address] (oncoming nurse) by Jorge Luis Rob RN (offgoing nurse). Report included the following information SBAR and Kardex.

## 2019-11-19 NOTE — PROGRESS NOTES
0124: Patient stating pain is unbearable and cannot wait until 0145 for pain meds. Calling on call MD now. 0130: Hannah Shaw NP called back, and told him patient was rating pain 9/10. Elijah BOLIVAR ordered 2 mg morphine IV q1h prn. 2 mg morphine IV was given at 0140, will continue to monitor patient to see if pain is being well controlled.

## 2019-11-19 NOTE — PROGRESS NOTES
Occupational Therapy Note  11/19/2019    Holding occupational therapy evaluation due to pt surgery today (planned lap jb). Will follow up with pt tomorrow.     Thank you,  CHRISTI Lacey/DANITZA

## 2019-11-20 LAB
ALBUMIN SERPL-MCNC: 2.3 G/DL (ref 3.5–5)
ALBUMIN/GLOB SERPL: 0.7 {RATIO} (ref 1.1–2.2)
ALP SERPL-CCNC: 87 U/L (ref 45–117)
ALT SERPL-CCNC: 100 U/L (ref 12–78)
ANION GAP SERPL CALC-SCNC: 3 MMOL/L (ref 5–15)
AST SERPL-CCNC: 47 U/L (ref 15–37)
BASOPHILS # BLD: 0 K/UL (ref 0–0.1)
BASOPHILS NFR BLD: 0 % (ref 0–1)
BILIRUB DIRECT SERPL-MCNC: 0.3 MG/DL (ref 0–0.2)
BILIRUB SERPL-MCNC: 0.7 MG/DL (ref 0.2–1)
BUN SERPL-MCNC: 21 MG/DL (ref 6–20)
BUN/CREAT SERPL: 21 (ref 12–20)
CALCIUM SERPL-MCNC: 9.5 MG/DL (ref 8.5–10.1)
CHLORIDE SERPL-SCNC: 107 MMOL/L (ref 97–108)
CO2 SERPL-SCNC: 30 MMOL/L (ref 21–32)
CREAT SERPL-MCNC: 1.01 MG/DL (ref 0.55–1.02)
DIFFERENTIAL METHOD BLD: ABNORMAL
EOSINOPHIL # BLD: 0 K/UL (ref 0–0.4)
EOSINOPHIL NFR BLD: 0 % (ref 0–7)
ERYTHROCYTE [DISTWIDTH] IN BLOOD BY AUTOMATED COUNT: 13.6 % (ref 11.5–14.5)
GLOBULIN SER CALC-MCNC: 3.4 G/DL (ref 2–4)
GLUCOSE BLD STRIP.AUTO-MCNC: 127 MG/DL (ref 65–100)
GLUCOSE BLD STRIP.AUTO-MCNC: 135 MG/DL (ref 65–100)
GLUCOSE BLD STRIP.AUTO-MCNC: 139 MG/DL (ref 65–100)
GLUCOSE BLD STRIP.AUTO-MCNC: 212 MG/DL (ref 65–100)
GLUCOSE SERPL-MCNC: 148 MG/DL (ref 65–100)
HCT VFR BLD AUTO: 32.3 % (ref 35–47)
HGB BLD-MCNC: 10.2 G/DL (ref 11.5–16)
IMM GRANULOCYTES # BLD AUTO: 0.1 K/UL (ref 0–0.04)
IMM GRANULOCYTES NFR BLD AUTO: 1 % (ref 0–0.5)
LYMPHOCYTES # BLD: 0.8 K/UL (ref 0.8–3.5)
LYMPHOCYTES NFR BLD: 7 % (ref 12–49)
MCH RBC QN AUTO: 30.1 PG (ref 26–34)
MCHC RBC AUTO-ENTMCNC: 31.6 G/DL (ref 30–36.5)
MCV RBC AUTO: 95.3 FL (ref 80–99)
MONOCYTES # BLD: 1.2 K/UL (ref 0–1)
MONOCYTES NFR BLD: 11 % (ref 5–13)
NEUTS SEG # BLD: 8.7 K/UL (ref 1.8–8)
NEUTS SEG NFR BLD: 81 % (ref 32–75)
NRBC # BLD: 0 K/UL (ref 0–0.01)
NRBC BLD-RTO: 0 PER 100 WBC
PLATELET # BLD AUTO: 172 K/UL (ref 150–400)
PMV BLD AUTO: 9.9 FL (ref 8.9–12.9)
POTASSIUM SERPL-SCNC: 4.4 MMOL/L (ref 3.5–5.1)
PROT SERPL-MCNC: 5.7 G/DL (ref 6.4–8.2)
RBC # BLD AUTO: 3.39 M/UL (ref 3.8–5.2)
RBC MORPH BLD: ABNORMAL
SERVICE CMNT-IMP: ABNORMAL
SODIUM SERPL-SCNC: 140 MMOL/L (ref 136–145)
WBC # BLD AUTO: 10.8 K/UL (ref 3.6–11)

## 2019-11-20 PROCEDURE — 74011250637 HC RX REV CODE- 250/637: Performed by: SURGERY

## 2019-11-20 PROCEDURE — 74011250636 HC RX REV CODE- 250/636: Performed by: SURGERY

## 2019-11-20 PROCEDURE — 82248 BILIRUBIN DIRECT: CPT

## 2019-11-20 PROCEDURE — 77010033678 HC OXYGEN DAILY

## 2019-11-20 PROCEDURE — 85025 COMPLETE CBC W/AUTO DIFF WBC: CPT

## 2019-11-20 PROCEDURE — 74011000258 HC RX REV CODE- 258: Performed by: SURGERY

## 2019-11-20 PROCEDURE — 97535 SELF CARE MNGMENT TRAINING: CPT

## 2019-11-20 PROCEDURE — 94640 AIRWAY INHALATION TREATMENT: CPT

## 2019-11-20 PROCEDURE — 36415 COLL VENOUS BLD VENIPUNCTURE: CPT

## 2019-11-20 PROCEDURE — 80053 COMPREHEN METABOLIC PANEL: CPT

## 2019-11-20 PROCEDURE — 74011636637 HC RX REV CODE- 636/637: Performed by: SURGERY

## 2019-11-20 PROCEDURE — 97165 OT EVAL LOW COMPLEX 30 MIN: CPT

## 2019-11-20 PROCEDURE — 97530 THERAPEUTIC ACTIVITIES: CPT

## 2019-11-20 PROCEDURE — 97161 PT EVAL LOW COMPLEX 20 MIN: CPT

## 2019-11-20 PROCEDURE — 82962 GLUCOSE BLOOD TEST: CPT

## 2019-11-20 PROCEDURE — 65270000032 HC RM SEMIPRIVATE

## 2019-11-20 PROCEDURE — 74011000250 HC RX REV CODE- 250: Performed by: SURGERY

## 2019-11-20 RX ORDER — AMOXICILLIN 250 MG
1 CAPSULE ORAL 2 TIMES DAILY
Status: DISCONTINUED | OUTPATIENT
Start: 2019-11-21 | End: 2019-11-25 | Stop reason: HOSPADM

## 2019-11-20 RX ORDER — POLYETHYLENE GLYCOL 3350 17 G/17G
17 POWDER, FOR SOLUTION ORAL DAILY
Status: DISCONTINUED | OUTPATIENT
Start: 2019-11-21 | End: 2019-11-25 | Stop reason: HOSPADM

## 2019-11-20 RX ADMIN — INSULIN GLARGINE 30 UNITS: 100 INJECTION, SOLUTION SUBCUTANEOUS at 08:30

## 2019-11-20 RX ADMIN — HUMAN INSULIN 3 UNITS: 100 INJECTION, SOLUTION SUBCUTANEOUS at 16:43

## 2019-11-20 RX ADMIN — ARFORMOTEROL TARTRATE 15 MCG: 15 SOLUTION RESPIRATORY (INHALATION) at 08:52

## 2019-11-20 RX ADMIN — BUDESONIDE 500 MCG: 0.5 INHALANT RESPIRATORY (INHALATION) at 08:52

## 2019-11-20 RX ADMIN — DULOXETINE HYDROCHLORIDE 60 MG: 60 CAPSULE, DELAYED RELEASE ORAL at 08:29

## 2019-11-20 RX ADMIN — SODIUM CHLORIDE, SODIUM LACTATE, POTASSIUM CHLORIDE, AND CALCIUM CHLORIDE 200 ML/HR: 600; 310; 30; 20 INJECTION, SOLUTION INTRAVENOUS at 16:43

## 2019-11-20 RX ADMIN — ARFORMOTEROL TARTRATE 15 MCG: 15 SOLUTION RESPIRATORY (INHALATION) at 20:12

## 2019-11-20 RX ADMIN — ASPIRIN 81 MG CHEWABLE TABLET 81 MG: 81 TABLET CHEWABLE at 08:29

## 2019-11-20 RX ADMIN — PRAVASTATIN SODIUM 40 MG: 40 TABLET ORAL at 21:57

## 2019-11-20 RX ADMIN — THERA TABS 1 TABLET: TAB at 08:29

## 2019-11-20 RX ADMIN — MORPHINE SULFATE 1 MG: 2 INJECTION, SOLUTION INTRAMUSCULAR; INTRAVENOUS at 04:40

## 2019-11-20 RX ADMIN — SODIUM CHLORIDE, SODIUM LACTATE, POTASSIUM CHLORIDE, AND CALCIUM CHLORIDE 200 ML/HR: 600; 310; 30; 20 INJECTION, SOLUTION INTRAVENOUS at 21:58

## 2019-11-20 RX ADMIN — MORPHINE SULFATE 1 MG: 2 INJECTION, SOLUTION INTRAMUSCULAR; INTRAVENOUS at 16:27

## 2019-11-20 RX ADMIN — MORPHINE SULFATE 1 MG: 2 INJECTION, SOLUTION INTRAMUSCULAR; INTRAVENOUS at 13:24

## 2019-11-20 RX ADMIN — BUDESONIDE 500 MCG: 0.5 INHALANT RESPIRATORY (INHALATION) at 20:12

## 2019-11-20 RX ADMIN — PIPERACILLIN AND TAZOBACTAM 3.38 G: 3; .375 INJECTION, POWDER, FOR SOLUTION INTRAVENOUS at 16:43

## 2019-11-20 RX ADMIN — MORPHINE SULFATE 1 MG: 2 INJECTION, SOLUTION INTRAMUSCULAR; INTRAVENOUS at 00:38

## 2019-11-20 RX ADMIN — EZETIMIBE 10 MG: 10 TABLET ORAL at 08:29

## 2019-11-20 RX ADMIN — MORPHINE SULFATE 1 MG: 2 INJECTION, SOLUTION INTRAMUSCULAR; INTRAVENOUS at 01:53

## 2019-11-20 RX ADMIN — CARVEDILOL 6.25 MG: 6.25 TABLET, FILM COATED ORAL at 08:29

## 2019-11-20 RX ADMIN — ONDANSETRON 4 MG: 2 INJECTION INTRAMUSCULAR; INTRAVENOUS at 13:24

## 2019-11-20 RX ADMIN — ONDANSETRON 4 MG: 2 INJECTION INTRAMUSCULAR; INTRAVENOUS at 08:55

## 2019-11-20 RX ADMIN — LISINOPRIL 5 MG: 5 TABLET ORAL at 08:29

## 2019-11-20 RX ADMIN — CARVEDILOL 6.25 MG: 6.25 TABLET, FILM COATED ORAL at 16:43

## 2019-11-20 RX ADMIN — MORPHINE SULFATE 1 MG: 2 INJECTION, SOLUTION INTRAMUSCULAR; INTRAVENOUS at 19:18

## 2019-11-20 RX ADMIN — Medication 1 CAPSULE: at 08:29

## 2019-11-20 RX ADMIN — PIPERACILLIN AND TAZOBACTAM 3.38 G: 3; .375 INJECTION, POWDER, FOR SOLUTION INTRAVENOUS at 08:29

## 2019-11-20 RX ADMIN — MORPHINE SULFATE 1 MG: 2 INJECTION, SOLUTION INTRAMUSCULAR; INTRAVENOUS at 08:55

## 2019-11-20 RX ADMIN — MORPHINE SULFATE 1 MG: 2 INJECTION, SOLUTION INTRAMUSCULAR; INTRAVENOUS at 21:57

## 2019-11-20 RX ADMIN — SODIUM CHLORIDE, SODIUM LACTATE, POTASSIUM CHLORIDE, AND CALCIUM CHLORIDE 200 ML/HR: 600; 310; 30; 20 INJECTION, SOLUTION INTRAVENOUS at 06:47

## 2019-11-20 RX ADMIN — SODIUM CHLORIDE 10 ML: 9 INJECTION INTRAMUSCULAR; INTRAVENOUS; SUBCUTANEOUS at 21:59

## 2019-11-20 RX ADMIN — ONDANSETRON 4 MG: 2 INJECTION INTRAMUSCULAR; INTRAVENOUS at 19:18

## 2019-11-20 RX ADMIN — FENOFIBRATE 145 MG: 145 TABLET ORAL at 08:29

## 2019-11-20 NOTE — OP NOTES
[address]   OPERATIVE REPORT    Name:  Jaylen Pérez  MR#:  [id number]  :  1940  ACCOUNT #:  [de-identified]  DATE OF SERVICE:  2019    PREOPERATIVE DIAGNOSIS:  Acute cholecystitis. POSTOPERATIVE DIAGNOSIS:  Acute cholecystitis. PROCEDURE PERFORMED:  Laparoscopic cholecystectomy. SURGEON:  Shanae Mathew MD    ASSISTANT:  Huber Murrieta SA    ANESTHESIA:  General endotracheal.    COMPLICATIONS:  None. SPECIMENS REMOVED:  1. Fluid from gallbladder lumen for culture and sensitivity. 2.  Gallbladder with content. IMPLANTS:  None. ESTIMATED BLOOD LOSS:  250 mL. DRAIN:  19 mm Henry drain. COUNTS:  Sponge count correct. Needle count correct. INDICATIONS:  The patient is a 72-year-old white female with multiple medical problems to include obesity, diabetes mellitus, and chronic lung disease, admitted with signs of choledocholithiasis and abdominal pain. Follow-on MRCP confirmed the CT findings of a distended common bile duct, with the additional findings of the development of signs of acute cholecystitis. She underwent ERCP with partial stone clearance and stent placement. Her abdominal pain continues, and she is taken to the operating room today for laparoscopic cholecystectomy. FINDINGS:  Gangrenous cholecystitis. PROCEDURE:  The patient was identified as the correct patient in the preoperative holding area and informed consent was confirmed. After answering the patient's remaining questions, she was taken to the operating room and placed on the operating room table in the supine position. Sequential compression devices were placed on both lower extremities. Following the uneventful initiation of general anesthesia, she was carefully secured to the operating room table with footboard and safety strap in place. All potential pressure points were padded with egg crate. Her abdomen was prepped and draped in the usual sterile fashion.   Final time-out was performed, and it was confirmed she had received intravenous antibiotics. A 5 mm trocar was inserted through a small left upper quadrant skin incision using an Optiview technique. After confirming intraperitoneal location of the trocar tip, insufflation with carbon dioxide gas was initiated. Once adequate working sites had been developed, a 5-mm, 30-degree laparoscope was inserted. No signs of trocar injury were present. Turbid, green-stained fluid was present in the subdiaphragmatic area on the right. A 12 mm trocar was inserted through a small epigastric incision using visual guidance with the laparoscope. The patient was placed in a steep reverse Trendelenburg position. Three additional upper abdominal trocars were inserted using identical technique. The transverse colon was retracted away from the area of the gallbladder, revealing signs of acute cholecystitis with gangrene of the fundus. 30 mL of foul-smelling bile were aspirated from the lumen of the gallbladder. This fluid was sent for culture and sensitivities. This allowed the non-gangrenous portion of the body of the gallbladder to be grasped and retracted cephalad. The infundibulum was identified and retracted laterally, after opening the peritoneum along its inferior edge. Thickened peritoneal tissue was present in the area of the infundibulum, precluding accurate assessment of the anatomy. Given these findings, the decision was made to proceed with dome-down technique. The peritoneum along the fundus of the gallbladder at its junction with the liver was incised using electrocautery, and this plane was developed towards the bed of the liver. Once the correct plane had been developed, the liver was retracted cephalad, with freeing of the gallbladder using a dome-down technique with a combination of blunt dissection and electrocautery. This allowed the dissection to be carried toward the area of the cystic duct.   Careful blunt dissection was then performed along the posterior aspect of the gallbladder, with eventual identification of the cystic artery. This structure was circumferentially dissected free from surrounding tissue and controlled with doubly placed titanium hemoclips before being divided. Thickened peritoneum on either side of the artery were controlled using electrocautery and clips. Dense, acute inflammatory changes precluded further dissection without risk of bile duct injury. Therefore, the area of the distal infundibulum was controlled with 2 green load linear stapler firings, followed by placement of the gallbladder within a retrieval bag and removed from the patient's body. Once pneumoperitoneum had been reestablished, the right upper quadrant was irrigated with sterile saline. Several small stones which had egressed through the gallbladder were removed, and some bleeding points within the hepatic parenchyma were controlled using a combination of electrocautery, Tisseel fibrin sealant, and the Neli hemostatic agent. A 19 mm Henry drain was inserted into the abdominal space. This allowed to lie adjacent to the gallbladder fossa of the liver and brought out through the left subcostal 5-mm trocar site. It was secured to the skin with a 2-0 nylon suture. After confirming adequate hemostasis, the epigastric fascial defect was closed with a series of 0 Vicryl sutures using a laparoscopic suture passer. Pneumoperitoneum was released, and all trocars were removed. All wounds were infiltrated with 0.5% Marcaine without epinephrine. All skin edges were reapproximated with a combination of subcuticular 4-0 Monocryl suture and Dermabond. The patient tolerated the procedure well. She was extubated in the operating room and transported to the recovery area in stable condition. The attending surgeon, Dr. Jacqueline Brooks, was scrubbed and present for the entire procedure.       Nilson Rivera MD [id number]  D:  11/19/2019 18:54  T:  11/20/2019 6:08  JOB #:  [id number]

## 2019-11-20 NOTE — PROGRESS NOTES
Progress Note    Patient: Loli Query MRN: [id number]  SSN: [id number]    YOB: 1940  Age: 70 y.o. Sex: female      Admit Date: 2019    1 Day Post-Op    Procedure:  Procedure(s):  CHOLECYSTECTOMY LAPAROSCOPIC    Subjective:     Patient notes improvement in abdominal pain; out of bed on bedside toilet. She is tolerating clear liquids. Objective:     Visit Vitals  /63   Pulse 85   Temp 98.6 °F (37 °C)   Resp 18   Ht 5' 4\" (1.626 m)   Wt 200 lb (90.7 kg)   SpO2 98%   BMI 34.33 kg/m²       Temp (24hrs), Av.4 °F (36.9 °C), Min:97.2 °F (36.2 °C), Max:99.8 °F (37.7 °C)      Physical Exam:    ABDOMEN: Obese, non-distended, soft. Incisions dry and intact. Sero-sanguinous drain fluid. Appropriate incisional pain with palpation. Data Review: VS, I/O's, Labs    Lab Review:   Recent Results (from the past 12 hour(s))   METABOLIC PANEL, COMPREHENSIVE    Collection Time: 19  5:40 AM   Result Value Ref Range    Sodium 140 136 - 145 mmol/L    Potassium 4.4 3.5 - 5.1 mmol/L    Chloride 107 97 - 108 mmol/L    CO2 30 21 - 32 mmol/L    Anion gap 3 (L) 5 - 15 mmol/L    Glucose 148 (H) 65 - 100 mg/dL    BUN 21 (H) 6 - 20 MG/DL    Creatinine 1.01 0.55 - 1.02 MG/DL    BUN/Creatinine ratio 21 (H) 12 - 20      GFR est AA >60 >60 ml/min/1.73m2    GFR est non-AA 53 (L) >60 ml/min/1.73m2    Calcium 9.5 8.5 - 10.1 MG/DL    Bilirubin, total 0.7 0.2 - 1.0 MG/DL    ALT (SGPT) 100 (H) 12 - 78 U/L    AST (SGOT) 47 (H) 15 - 37 U/L    Alk.  phosphatase 87 45 - 117 U/L    Protein, total 5.7 (L) 6.4 - 8.2 g/dL    Albumin 2.3 (L) 3.5 - 5.0 g/dL    Globulin 3.4 2.0 - 4.0 g/dL    A-G Ratio 0.7 (L) 1.1 - 2.2     CBC WITH AUTOMATED DIFF    Collection Time: 19  5:40 AM   Result Value Ref Range    WBC 10.8 3.6 - 11.0 K/uL    RBC 3.39 (L) 3.80 - 5.20 M/uL    HGB 10.2 (L) 11.5 - 16.0 g/dL    HCT 32.3 (L) 35.0 - 47.0 %    MCV 95.3 80.0 - 99.0 FL    MCH 30.1 26.0 - 34.0 PG    MCHC 31.6 30.0 - 36.5 g/dL    RDW 13.6 11.5 - 14.5 %    PLATELET 971 716 - 933 K/uL    MPV 9.9 8.9 - 12.9 FL    NRBC 0.0 0  WBC    ABSOLUTE NRBC 0.00 0.00 - 0.01 K/uL    NEUTROPHILS 81 (H) 32 - 75 %    LYMPHOCYTES 7 (L) 12 - 49 %    MONOCYTES 11 5 - 13 %    EOSINOPHILS 0 0 - 7 %    BASOPHILS 0 0 - 1 %    IMMATURE GRANULOCYTES 1 (H) 0.0 - 0.5 %    ABS. NEUTROPHILS 8.7 (H) 1.8 - 8.0 K/UL    ABS. LYMPHOCYTES 0.8 0.8 - 3.5 K/UL    ABS. MONOCYTES 1.2 (H) 0.0 - 1.0 K/UL    ABS. EOSINOPHILS 0.0 0.0 - 0.4 K/UL    ABS. BASOPHILS 0.0 0.0 - 0.1 K/UL    ABS. IMM. GRANS. 0.1 (H) 0.00 - 0.04 K/UL    DF SMEAR SCANNED      RBC COMMENTS NORMOCYTIC, NORMOCHROMIC     BILIRUBIN, DIRECT    Collection Time: 19  5:40 AM   Result Value Ref Range    Bilirubin, direct 0.3 (H) 0.0 - 0.2 MG/DL   GLUCOSE, POC    Collection Time: 19  6:31 AM   Result Value Ref Range    Glucose (POC) 135 (H) 65 - 100 mg/dL    Performed by Tyson Venegas          Assessment:     Hospital Problems  Date Reviewed: 2019          Codes Class Noted POA    Dilation of common bile duct ICD-10-CM: K83.8  ICD-9-CM: 576.8  2019 Unknown              Plan/Recommendations/Medical Decision Makin. Continue antibiotics. , drain. 2. GI Lite diet. 3. Out of bed in chair, PT consult.

## 2019-11-20 NOTE — PROGRESS NOTES
Problem: Pressure Injury - Risk of  Goal: *Prevention of pressure injury  Description  Document Fam Scale and appropriate interventions in the flowsheet. Outcome: Progressing Towards Goal  Note: Pressure Injury Interventions: Activity Interventions: Increase time out of bed    Mobility Interventions: Pressure redistribution bed/mattress (bed type)    Nutrition Interventions: Document food/fluid/supplement intake                     Problem: Patient Education: Go to Patient Education Activity  Goal: Patient/Family Education  Outcome: Progressing Towards Goal     Problem: Falls - Risk of  Goal: *Absence of Falls  Description  Document Geovanna Griffiths Fall Risk and appropriate interventions in the flowsheet.   Outcome: Progressing Towards Goal  Note: Fall Risk Interventions:  Mobility Interventions: Patient to call before getting OOB, Communicate number of staff needed for ambulation/transfer         Medication Interventions: Patient to call before getting OOB    Elimination Interventions: Call light in reach    History of Falls Interventions: Door open when patient unattended         Problem: Patient Education: Go to Patient Education Activity  Goal: Patient/Family Education  Outcome: Progressing Towards Goal

## 2019-11-20 NOTE — PROGRESS NOTES
Spiritual Care Assessment/Progress Note  [facility]      NAME: Rolando Gongora      MRN: [id number]  AGE: 70 y.o. SEX: female  Latter-day Affiliation: Zoroastrianism   Language: English     11/20/2019     Total Time (in minutes): 5     Spiritual Assessment begun in [facility] [phone] through conversation with:         [x]Patient        [x] Family    [] Friend(s)        Reason for Consult: [facility]     Spiritual beliefs: (Please include comment if needed)     [x] Identifies with a lefty tradition:   Zoroastrianism      [x] Supported by a lefty community:   None         [] Claims no spiritual orientation:           [] Seeking spiritual identity:                [] Adheres to an individual form of spirituality:           [] Not able to assess:                           Identified resources for coping:      [x] Prayer                               [x] Music                  [] Guided Imagery     [x] Family/friends                 [] Pet visits     [] Devotional reading                         [] Unknown     [] Other:                                              Interventions offered during this visit: (See comments for more details)    Patient Interventions: Affirmation of lefty, Communion [employer]), Initial/Spiritual assessment, patient floor, Prayer (actual), Prayer (assurance of)     Family/Friend(s):  Affirmation of lefty, Prayer (actual), Prayer (assurance of)     Plan of Care:     [x] Support spiritual and/or cultural needs    [] Support AMD and/or advance care planning process      [] Support grieving process   [] Coordinate Rites and/or Rituals    [] Coordination with community clergy   [] No spiritual needs identified at this time   [] Detailed Plan of Care below (See Comments)  [] Make referral to Music Therapy  [] Make referral to Pet Therapy     [] Make referral to Addiction services  [] Make referral to [facility]  [] Make referral to Spiritual Care Partner  [] No future visits requested [] Follow up visits as needed     Comments: [address] visit. Mrs. Tres Gasca had not received communion today. Prayer and communion offered. Mrs. Tres Gasca said she doesn't have a parish she belongs to, but she lives out the values and beliefs she learned from her [facility] upbringing and her father. She lives alone. She tires easily especially with talking.     ZEYNEP Wen, RN, ACSW, LCSW   Page:  [phone]

## 2019-11-20 NOTE — PROGRESS NOTES
Problem: Self Care Deficits Care Plan (Adult)  Goal: *Acute Goals and Plan of Care (Insert Text)  Description    FUNCTIONAL STATUS PRIOR TO ADMISSION: Patient was modified independent using a rollator for functional mobility. HOME SUPPORT: The patient lived alone with family to provide assistance. Occupational Therapy Goals  Initiated 11/20/2019  1. Patient will perform grooming standing at sink with modified independence within 7 day(s). 2.  Patient will perform lower body dressing with modified independence within 7 day(s). 3.  Patient will perform bathing with modified independence within 7 day(s). 4.  Patient will perform toilet transfers with modified independence within 7 day(s). 5.  Patient will perform all aspects of toileting with modified independence within 7 day(s). 6.  Patient will participate in upper extremity therapeutic exercise/activities with independence for 10 minutes within 7 day(s). 7.  Patient will utilize energy conservation techniques during functional activities with verbal cues within 7 day(s). 11/20/2019 1421 by Charlie Ruiz OTR/L  Outcome: Progressing Towards Goal  OCCUPATIONAL THERAPY EVALUATION  Patient: Luiz Damon (17 y.o. female)  Date: 11/20/2019  Primary Diagnosis: Dilation of common bile duct [K83.8]  Procedure(s) (LRB):  CHOLECYSTECTOMY LAPAROSCOPIC (N/A) 1 Day Post-Op   Precautions:   Fall    ASSESSMENT  Based on the objective data described below, the patient presents with decrease strength, decrease activity tolerance (on 3L, SOB with minimal activity), decrease mobility and decrease independence with self-care. Pt lives home alone with family support. Pt does not drive and family does grocery store shopping and MD appts. Pt is not at baseline, but feel she will con't to progress during her hospital stay. Will con't to follow and address listed goals.       Current Level of Function Impacting Discharge (ADLs/self-care): min assist Functional Outcome Measure: The patient scored 60/100 on the Barthel outcome measure which is indicative of impairment. Other factors to consider for discharge: lives alone     Patient will benefit from skilled therapy intervention to address the above noted impairments. PLAN :  Recommendations and Planned Interventions: self care training, functional mobility training, therapeutic exercise, therapeutic activities, endurance activities, patient education, home safety training, and family training/education    Frequency/Duration: Patient will be followed by occupational therapy 5 times a week to address goals.     Recommendation for discharge: (in order for the patient to meet his/her long term goals)  To be determined: HHOT vs SNF, pending progress     This discharge recommendation:  A follow-up discussion with the attending provider and/or case management is planned    IF patient discharges home will need the following DME: to be determined (TBD)       SUBJECTIVE:   Patient stated It feels like a 10.    OBJECTIVE DATA SUMMARY:   HISTORY:   Past Medical History:   Diagnosis Date    Anemia NEC     Arthritis     Asthma     CAD (coronary artery disease)     NSTEMI following PCI    Calculus of kidney 7-    [facility]    Cancer ([facility] [address]     skin    Chronic pain     degenerative disc disease, lower right back    COPD     Depression     Diabetes ([address]     GERD (gastroesophageal reflux disease)     Hx of mammogram 11/29/2017    [facility] - No mammographic evidence of malignancy - annual follow up recommended     Hypertension     2005 Dx    Joint pain     Morbid obesity ([address]     Psychiatric disorder     depression    Sleep disorder      Past Surgical History:   Procedure Laterality Date    CARDIAC SURG PROCEDURE UNLIST      stents    COLONOSCOPY N/A 7/30/2019    COLONOSCOPY performed by Mitch Armstrong MD at [address] [place] [address],[address]  7/30/2019         HX APPENDECTOMY      in 29's    HX CORONARY STENT PLACEMENT  dec 2013    HX HYSTERECTOMY      HX LITHOTRIPSY      HX TRABECULECTOMY      HX TUBAL LIGATION      UPPER GI ENDOSCOPY,BIOPSY  7/30/2019            Expanded or extensive additional review of patient history:     Home Situation  Home Environment: Private residence  # Steps to Enter: 0  Wheelchair Ramp: Yes  One/Two Story Residence: One story  Living Alone: Yes  Support Systems: Child(anastasia)  Patient Expects to be Discharged to[de-identified] Unknown  Current DME Used/Available at Home: Communication device, Walker, rolling, Grab bars, Shower chair, Hospital bed, Oxygen, portable, Walker, rollator  Tub or Shower Type: Other (comment)(walk in tub with door)        EXAMINATION OF PERFORMANCE DEFICITS:  Cognitive/Behavioral Status:  Neurologic State: Alert; Appropriate for age  Orientation Level: Appropriate for age  Cognition: Follows commands  Perception: Appears intact  Perseveration: No perseveration noted  Safety/Judgement: Fall prevention    Skin: intact    Edema: none noted    Hearing: Auditory  Auditory Impairment: None    Vision/Perceptual:                                     Range of Motion:    AROM: Within functional limits                         Strength:    Strength: Generally decreased, functional                Coordination:  Coordination: Within functional limits  Fine Motor Skills-Upper: Left Intact; Right Intact    Gross Motor Skills-Upper: Left Intact; Right Intact    Tone & Sensation:    Tone: Normal  Sensation: Intact                      Balance:  Sitting: Intact  Standing: Intact; With support    Functional Mobility and Transfers for ADLs:  Bed Mobility:  Supine to Sit: Minimum assistance;Assist x1;Other (comment)(HOB elevated)    Transfers:  Sit to Stand: Minimum assistance;Assist x1  Stand to Sit: Minimum assistance;Assist x1  Bed to Chair: Minimum assistance;Assist x1  Toilet Transfer : Minimum assistance;Assist x1    ADL Assessment:  Feeding: Independent    Oral Facial Hygiene/Grooming: Setup    Bathing: Minimum assistance;Assist x1    Upper Body Dressing: Setup    Lower Body Dressing: Stand-by assistance; Additional time    Toileting: Stand by assistance                ADL Intervention and task modifications:         Cognitive Retraining  Safety/Judgement: Fall prevention      Functional Measure:  Barthel Index:    Bathin  Bladder: 10  Bowels: 10  Groomin  Dressin  Feeding: 10  Mobility: 5  Stairs: 0  Toilet Use: 5  Transfer (Bed to Chair and Back): 10  Total: 60/100        The Barthel ADL Index: Guidelines  1. The index should be used as a record of what a patient does, not as a record of what a patient could do. 2. The main aim is to establish degree of independence from any help, physical or verbal, however minor and for whatever reason. 3. The need for supervision renders the patient not independent. 4. A patient's performance should be established using the best available evidence. Asking the patient, friends/relatives and nurses are the usual sources, but direct observation and common sense are also important. However direct testing is not needed. 5. Usually the patient's performance over the preceding 24-48 hours is important, but occasionally longer periods will be relevant. 6. Middle categories imply that the patient supplies over 50 per cent of the effort. 7. Use of aids to be independent is allowed. Peg Childress., Barthel, D.W. (0117). Functional evaluation: the Barthel Index. [facility] (14)2. SHELBY Perez, Darron Smith., Mora Ramon., [place], [address] [place] [address] (). Measuring the change indisability after inpatient rehabilitation; comparison of the responsiveness of the Barthel Index and Functional [place] Measure. Journal of Neurology, Neurosurgery, and Psychiatry, 66(4), 685-458. Jordyn Amezcua, N.J.A, JAMES Betancourt.JOEL, & Sonali Mehta, MLaurelA. (2004.) Assessment of post-stroke quality of life in cost-effectiveness studies:  The usefulness of the Barthel Index and the EuroQoL-5D. Quality of Life Research, 15, 256-06        Occupational Therapy Evaluation Charge Determination   History Examination Decision-Making   LOW Complexity : Brief history review  LOW Complexity : 1-3 performance deficits relating to physical, cognitive , or psychosocial skils that result in activity limitations and / or participation restrictions  LOW Complexity : No comorbidities that affect functional and no verbal or physical assistance needed to complete eval tasks       Based on the above components, the patient evaluation is determined to be of the following complexity level: LOW   Pain Rating:  10/10, nursing notified    Activity Tolerance:   Fair  Please refer to the flowsheet for vital signs taken during this treatment. After treatment patient left in no apparent distress:    Sitting in chair and Caregiver / family present    COMMUNICATION/EDUCATION:   The patients plan of care was discussed with: Physical Therapist and Registered Nurse. Home safety education was provided and the patient/caregiver indicated understanding., Patient/family have participated as able in goal setting and plan of care. , and Patient/family agree to work toward stated goals and plan of care. This patients plan of care is appropriate for delegation to [facility].     Thank you for this referral.  Negro Doe OTR/L  Time Calculation: 26 mins

## 2019-11-20 NOTE — PROGRESS NOTES
Problem: Mobility Impaired (Adult and Pediatric)  Goal: *Acute Goals and Plan of Care (Insert Text)  Description  FUNCTIONAL STATUS PRIOR TO ADMISSION: Patient was modified independent with rollator in her home PTA. Pt did not drive. Children assist with grocery shopping and appointments. HOME SUPPORT PRIOR TO ADMISSION: The patient lived alone with children (x4) in the area. Pt reports completing her ADLs independently. Physical Therapy Goals  Initiated 11/20/2019  1. Patient will move from supine to sit and sit to supine  in bed with supervision/set-up within 7 day(s). 2.  Patient will transfer from bed to chair and chair to bed with supervision/set-up using the least restrictive device within 7 day(s). 3.  Patient will perform sit to stand with supervision/set-up within 7 day(s). 4.  Patient will ambulate with minimal assistance/contact guard assist for 75 feet with the least restrictive device within 7 day(s). 5.  Patient has a ramp per home needs. Outcome: Progressing Towards Goal   PHYSICAL THERAPY EVALUATION  Patient: Shonda Johnson (46 y.o. female)  Date: 11/20/2019  Primary Diagnosis: Dilation of common bile duct [K83.8]  Procedure(s) (LRB):  CHOLECYSTECTOMY LAPAROSCOPIC (N/A) 1 Day Post-Op   Precautions:  Fall      ASSESSMENT  Pt seen this afternoon following RN clearance. Pt resting in bed and begrudgingly agreeable to bed mobility, toileting on BSC, and OOB to chair for lunch. Pt's disposition improved with activity and pt even began to joke with PT/OT. Pt reports living alone and being independent at baseline. Today, pt reports 10/10 pain with little activity. She is further limited by decreased strength, balance, ROM, and some coordination. Pt's DTR present for this session and supportive. DTR reports pt's children have not had a chance to get together to discuss discharge plan yet.   Explained to DTR, based on today's performance, therapy would recommend home with 24/7 assistance and HHPT vs brief rehab stay. However, will continue to progress pt M-F during her acute stay for optimal goal of return home. Recommend OOB to chair for all meals and walking to/from bathroom with RW/gait belt as tolerated for toileting. Next session: bed mobility, progress gait tolerance with RW, OOB to chair    Current Level of Function Impacting Discharge (mobility/balance): Josr with DME    Functional Outcome Measure: The patient scored 10/28 on the Tinetti outcome measure which is indicative of high fall risk. Other factors to consider for discharge: pt lives alone and has a +fall hx (!)     Patient will benefit from skilled therapy intervention to address the above noted impairments. PLAN :  Recommendations and Planned Interventions: bed mobility training, transfer training, gait training, therapeutic exercises, patient and family training/education, and therapeutic activities      Frequency/Duration: Patient will be followed by physical therapy:  5 times a week to address goals. Recommendation for discharge: (in order for the patient to meet his/her long term goals)  Physical therapy at least 2 days/week in the home with 24/7 assistance for initial return home    This discharge recommendation:  A follow-up discussion with the attending provider and/or case management is planned    IF patient discharges home will need the following DME: to be determined (TBD)         SUBJECTIVE:   Patient stated It takes me a while. .. I had kidney surgery. ..  Pt attempting to void on BSC.     OBJECTIVE DATA SUMMARY:   HISTORY:    Past Medical History:   Diagnosis Date    Anemia NEC     Arthritis     Asthma     CAD (coronary artery disease)     NSTEMI following PCI    Calculus of kidney 7-    [facility]    Cancer ([facility] [address]     skin    Chronic pain     degenerative disc disease, lower right back    COPD     Depression     Diabetes ([facility] [address]     GERD (gastroesophageal reflux disease)     Hx of mammogram 11/29/2017    [facility] - No mammographic evidence of malignancy - annual follow up recommended     Hypertension     2005 Dx    Joint pain     Morbid obesity ([facility] [address]     Psychiatric disorder     depression    Sleep disorder      Past Surgical History:   Procedure Laterality Date    CARDIAC SURG PROCEDURE UNLIST      stents    COLONOSCOPY N/A 7/30/2019    COLONOSCOPY performed by Maki Martinez MD at [facility],[address]  7/30/2019         HX APPENDECTOMY      in 30's    HX CORONARY STENT PLACEMENT  dec 2013    HX HYSTERECTOMY      HX LITHOTRIPSY      HX TRABECULECTOMY      HX TUBAL LIGATION      UPPER GI ENDOSCOPY,BIOPSY  7/30/2019            Personal factors and/or comorbidities impacting plan of care: +fall hx and limited insight into deficits/need for assistance    Home Situation  Home Environment: Private residence  # Steps to Enter: 0  Wheelchair Ramp: Yes  One/Two Story Residence: One story  Living Alone: Yes  Support Systems: Child(anastasia)  Patient Expects to be Discharged to[de-identified] Unknown  Current DME Used/Available at Home: Communication device, Walker, rolling, Grab bars, Shower chair, Hospital bed, Oxygen, portable, Walker, rollator  Tub or Shower Type: Other (comment)(walk in tub with door)    EXAMINATION/PRESENTATION/DECISION MAKING:   Critical Behavior:  Neurologic State: Alert, Appropriate for age  Orientation Level: Appropriate for age  Cognition: Follows commands  Safety/Judgement: Fall prevention  Skin: intact     Edema: none noted     Hearing: Auditory Impairment: None     Range of Motion:  AROM: generally decreased, functional     Strength:  Strength: Generally decreased, functional     Coordination:  Coordination: Within functional limits     Tone & Sensation:  Tone: Normal  Sensation: Intact     Balance:  Sitting: Intact  Standing: Intact; With support     Functional Mobility and Transfers for ADLs:  Bed Mobility:  Supine to Sit: Minimum assistance;Assist x1;Other (comment)(HOB elevated and rail used on R)     Transfers: EOB to BSC on L and then chair on L  Sit to Stand: Minimum assistance;Assist x1  Stand to Sit: Minimum assistance;Assist x1  Bed to Chair: Minimum assistance;Assist x1  Toilet Transfer : Minimum assistance;Assist x1    Functional Measure:  Tinetti test:    Sitting Balance: 1  Arises: 1  Attempts to Rise: 2  Immediate Standing Balance: 1  Standing Balance: 1  Nudged: 0  Eyes Closed: 0  Turn 360 Degrees - Continuous/Discontinuous: 0  Turn 360 Degrees - Steady/Unsteady: 0  Sitting Down: 1  Balance Score: 7 Balance total score  Indication of Gait: 0  R Step Length/Height: 0  L Step Length/Height: 0  R Foot Clearance: 1  L Foot Clearance: 1  Step Symmetry: 0  Step Continuity: 0  Path: 1  Trunk: 0  Walking Time: 0  Gait Score: 3 Gait total score  Total Score: 10/28 Overall total score         Tinetti Tool Score Risk of Falls  <19 = High Fall Risk  19-24 = Moderate Fall Risk  25-28 = Low Fall Risk  Tinetti [place]. Performance-Oriented Assessment of Mobility Problems in Elderly Patients. [address]; [id number]. (Scoring Description: PT Bulletin Feb. 10, 1993)    Older adults: Kristi Perez et al, 2009; n = 1000 [facility] elderly evaluated with ABC, HIREN, ADL, and IADL)  · Mean HIREN score for males aged 69-68 years = 26.21(3.40)  · Mean HIREN score for females age 69-68 years = 25.16(4.30)  · Mean HIREN score for males over 80 years = 23.29(6.02)  · Mean HIREN score for females over 80 years = 17.20(8.32)         Pain Rating:  Pt reports 10/10 pain with mobility; pain meds and Zofran requested    Activity Tolerance:   Fair, requires rest breaks, and pt is self-limiting    Please refer to the flowsheet for vital signs taken during this treatment.     After treatment patient left in no apparent distress:   Sitting in chair, Call bell within reach, and Caregiver / family present    COMMUNICATION/EDUCATION:   The patients plan of care was discussed with: Occupational Therapist and Registered Nurse.    [facility] prevention education was provided and the patient/caregiver indicated understanding., Patient/family have participated as able in goal setting and plan of care. , and Patient/family agree to work toward stated goals and plan of care.     Thank you for this referral.  Pedro Luis Murguia   Time Calculation: 23 mins

## 2019-11-20 NOTE — PROGRESS NOTES
Bedside shift change report given to Светлана Hull RN (oncoming nurse) by Elizabeth Price RN (offgoing nurse). Report included the following information SBAR, Kardex, Intake/Output, MAR and Recent Results.

## 2019-11-20 NOTE — PROGRESS NOTES
Bedside shift change report given to [address] [place] [address] (oncoming nurse) by Mena Roth RN (offgoing nurse). Report included the following information SBAR, Kardex, Intake/Output and MAR.

## 2019-11-20 NOTE — PROGRESS NOTES
RN attempted to get patient up for first time after surgery. Patient alert and vitals stable, but presents as very weak and unsteady. Transferred to bedside commode. Patient to see PT/OT tomorrow. Patient has voided 300 cc eliceo urine, post void scan of 55 cc. Patient has tolerated clear liquid diet, demonstrating use of incentive spirometry. LAMAR output 175 cc to date. Hospitalist NP rounding on unit states this amount is appropriate for new post op patient.

## 2019-11-21 LAB
ALBUMIN SERPL-MCNC: 2.3 G/DL (ref 3.5–5)
ALBUMIN/GLOB SERPL: 0.7 {RATIO} (ref 1.1–2.2)
ALP SERPL-CCNC: 115 U/L (ref 45–117)
ALT SERPL-CCNC: 96 U/L (ref 12–78)
ANION GAP SERPL CALC-SCNC: 2 MMOL/L (ref 5–15)
AST SERPL-CCNC: 53 U/L (ref 15–37)
BASOPHILS # BLD: 0 K/UL (ref 0–0.1)
BASOPHILS NFR BLD: 0 % (ref 0–1)
BILIRUB SERPL-MCNC: 0.6 MG/DL (ref 0.2–1)
BUN SERPL-MCNC: 21 MG/DL (ref 6–20)
BUN/CREAT SERPL: 23 (ref 12–20)
CALCIUM SERPL-MCNC: 9.4 MG/DL (ref 8.5–10.1)
CHLORIDE SERPL-SCNC: 106 MMOL/L (ref 97–108)
CO2 SERPL-SCNC: 31 MMOL/L (ref 21–32)
COMMENT, HOLDF: NORMAL
CREAT SERPL-MCNC: 0.93 MG/DL (ref 0.55–1.02)
DIFFERENTIAL METHOD BLD: ABNORMAL
EOSINOPHIL # BLD: 0.2 K/UL (ref 0–0.4)
EOSINOPHIL NFR BLD: 2 % (ref 0–7)
ERYTHROCYTE [DISTWIDTH] IN BLOOD BY AUTOMATED COUNT: 13.4 % (ref 11.5–14.5)
GLOBULIN SER CALC-MCNC: 3.2 G/DL (ref 2–4)
GLUCOSE BLD STRIP.AUTO-MCNC: 113 MG/DL (ref 65–100)
GLUCOSE BLD STRIP.AUTO-MCNC: 151 MG/DL (ref 65–100)
GLUCOSE BLD STRIP.AUTO-MCNC: 156 MG/DL (ref 65–100)
GLUCOSE BLD STRIP.AUTO-MCNC: 71 MG/DL (ref 65–100)
GLUCOSE BLD STRIP.AUTO-MCNC: 72 MG/DL (ref 65–100)
GLUCOSE BLD STRIP.AUTO-MCNC: 95 MG/DL (ref 65–100)
GLUCOSE SERPL-MCNC: 82 MG/DL (ref 65–100)
HCT VFR BLD AUTO: 29.5 % (ref 35–47)
HGB BLD-MCNC: 9.5 G/DL (ref 11.5–16)
IMM GRANULOCYTES # BLD AUTO: 0.1 K/UL (ref 0–0.04)
IMM GRANULOCYTES NFR BLD AUTO: 1 % (ref 0–0.5)
LYMPHOCYTES # BLD: 1.5 K/UL (ref 0.8–3.5)
LYMPHOCYTES NFR BLD: 22 % (ref 12–49)
MCH RBC QN AUTO: 30.6 PG (ref 26–34)
MCHC RBC AUTO-ENTMCNC: 32.2 G/DL (ref 30–36.5)
MCV RBC AUTO: 95.2 FL (ref 80–99)
MONOCYTES # BLD: 0.9 K/UL (ref 0–1)
MONOCYTES NFR BLD: 14 % (ref 5–13)
NEUTS SEG # BLD: 4.1 K/UL (ref 1.8–8)
NEUTS SEG NFR BLD: 61 % (ref 32–75)
NRBC # BLD: 0 K/UL (ref 0–0.01)
NRBC BLD-RTO: 0 PER 100 WBC
PLATELET # BLD AUTO: 153 K/UL (ref 150–400)
PMV BLD AUTO: 10 FL (ref 8.9–12.9)
POTASSIUM SERPL-SCNC: 3.9 MMOL/L (ref 3.5–5.1)
PROT SERPL-MCNC: 5.5 G/DL (ref 6.4–8.2)
RBC # BLD AUTO: 3.1 M/UL (ref 3.8–5.2)
SAMPLES BEING HELD,HOLD: NORMAL
SERVICE CMNT-IMP: ABNORMAL
SERVICE CMNT-IMP: NORMAL
SODIUM SERPL-SCNC: 139 MMOL/L (ref 136–145)
WBC # BLD AUTO: 6.7 K/UL (ref 3.6–11)

## 2019-11-21 PROCEDURE — 94640 AIRWAY INHALATION TREATMENT: CPT

## 2019-11-21 PROCEDURE — 74011000250 HC RX REV CODE- 250: Performed by: SURGERY

## 2019-11-21 PROCEDURE — 74011250636 HC RX REV CODE- 250/636: Performed by: SURGERY

## 2019-11-21 PROCEDURE — 65270000032 HC RM SEMIPRIVATE

## 2019-11-21 PROCEDURE — 80053 COMPREHEN METABOLIC PANEL: CPT

## 2019-11-21 PROCEDURE — 74011250637 HC RX REV CODE- 250/637: Performed by: SURGERY

## 2019-11-21 PROCEDURE — 85025 COMPLETE CBC W/AUTO DIFF WBC: CPT

## 2019-11-21 PROCEDURE — 97116 GAIT TRAINING THERAPY: CPT

## 2019-11-21 PROCEDURE — 74011636637 HC RX REV CODE- 636/637: Performed by: SURGERY

## 2019-11-21 PROCEDURE — 36415 COLL VENOUS BLD VENIPUNCTURE: CPT

## 2019-11-21 PROCEDURE — 74011250637 HC RX REV CODE- 250/637: Performed by: FAMILY MEDICINE

## 2019-11-21 PROCEDURE — 82962 GLUCOSE BLOOD TEST: CPT

## 2019-11-21 PROCEDURE — 74011000258 HC RX REV CODE- 258: Performed by: SURGERY

## 2019-11-21 PROCEDURE — 77010033678 HC OXYGEN DAILY

## 2019-11-21 RX ORDER — TRAMADOL HYDROCHLORIDE 50 MG/1
50 TABLET ORAL
Status: DISCONTINUED | OUTPATIENT
Start: 2019-11-21 | End: 2019-11-25 | Stop reason: HOSPADM

## 2019-11-21 RX ORDER — MORPHINE SULFATE 15 MG/1
15 TABLET ORAL
Status: DISCONTINUED | OUTPATIENT
Start: 2019-11-21 | End: 2019-11-25 | Stop reason: HOSPADM

## 2019-11-21 RX ORDER — ACETAMINOPHEN 500 MG
500 TABLET ORAL
Status: DISCONTINUED | OUTPATIENT
Start: 2019-11-21 | End: 2019-11-25 | Stop reason: HOSPADM

## 2019-11-21 RX ADMIN — ASPIRIN 81 MG CHEWABLE TABLET 81 MG: 81 TABLET CHEWABLE at 09:59

## 2019-11-21 RX ADMIN — STANDARDIZED SENNA CONCENTRATE AND DOCUSATE SODIUM 1 TABLET: 8.6; 5 TABLET ORAL at 19:04

## 2019-11-21 RX ADMIN — SODIUM CHLORIDE, SODIUM LACTATE, POTASSIUM CHLORIDE, AND CALCIUM CHLORIDE 200 ML/HR: 600; 310; 30; 20 INJECTION, SOLUTION INTRAVENOUS at 07:47

## 2019-11-21 RX ADMIN — SODIUM CHLORIDE 10 ML: 9 INJECTION INTRAMUSCULAR; INTRAVENOUS; SUBCUTANEOUS at 14:05

## 2019-11-21 RX ADMIN — ONDANSETRON 4 MG: 2 INJECTION INTRAMUSCULAR; INTRAVENOUS at 05:04

## 2019-11-21 RX ADMIN — STANDARDIZED SENNA CONCENTRATE AND DOCUSATE SODIUM 1 TABLET: 8.6; 5 TABLET ORAL at 09:59

## 2019-11-21 RX ADMIN — PIPERACILLIN AND TAZOBACTAM 3.38 G: 3; .375 INJECTION, POWDER, FOR SOLUTION INTRAVENOUS at 00:30

## 2019-11-21 RX ADMIN — SODIUM CHLORIDE 10 ML: 9 INJECTION INTRAMUSCULAR; INTRAVENOUS; SUBCUTANEOUS at 21:48

## 2019-11-21 RX ADMIN — FENOFIBRATE 145 MG: 145 TABLET ORAL at 09:59

## 2019-11-21 RX ADMIN — THERA TABS 1 TABLET: TAB at 09:59

## 2019-11-21 RX ADMIN — MORPHINE SULFATE 15 MG: 15 TABLET ORAL at 19:55

## 2019-11-21 RX ADMIN — CARVEDILOL 6.25 MG: 6.25 TABLET, FILM COATED ORAL at 19:04

## 2019-11-21 RX ADMIN — LISINOPRIL 5 MG: 5 TABLET ORAL at 09:59

## 2019-11-21 RX ADMIN — INSULIN GLARGINE 30 UNITS: 100 INJECTION, SOLUTION SUBCUTANEOUS at 09:59

## 2019-11-21 RX ADMIN — ARFORMOTEROL TARTRATE 15 MCG: 15 SOLUTION RESPIRATORY (INHALATION) at 07:56

## 2019-11-21 RX ADMIN — PIPERACILLIN AND TAZOBACTAM 3.38 G: 3; .375 INJECTION, POWDER, FOR SOLUTION INTRAVENOUS at 23:40

## 2019-11-21 RX ADMIN — MORPHINE SULFATE 1 MG: 2 INJECTION, SOLUTION INTRAMUSCULAR; INTRAVENOUS at 15:17

## 2019-11-21 RX ADMIN — Medication 16 G: at 06:59

## 2019-11-21 RX ADMIN — SODIUM CHLORIDE, SODIUM LACTATE, POTASSIUM CHLORIDE, AND CALCIUM CHLORIDE 200 ML/HR: 600; 310; 30; 20 INJECTION, SOLUTION INTRAVENOUS at 05:04

## 2019-11-21 RX ADMIN — PIPERACILLIN AND TAZOBACTAM 3.38 G: 3; .375 INJECTION, POWDER, FOR SOLUTION INTRAVENOUS at 07:04

## 2019-11-21 RX ADMIN — MORPHINE SULFATE 1 MG: 2 INJECTION, SOLUTION INTRAMUSCULAR; INTRAVENOUS at 05:04

## 2019-11-21 RX ADMIN — Medication 1 CAPSULE: at 09:59

## 2019-11-21 RX ADMIN — DULOXETINE HYDROCHLORIDE 60 MG: 60 CAPSULE, DELAYED RELEASE ORAL at 09:59

## 2019-11-21 RX ADMIN — BUDESONIDE 500 MCG: 0.5 INHALANT RESPIRATORY (INHALATION) at 07:56

## 2019-11-21 RX ADMIN — SODIUM CHLORIDE 10 ML: 9 INJECTION INTRAMUSCULAR; INTRAVENOUS; SUBCUTANEOUS at 05:05

## 2019-11-21 RX ADMIN — CARVEDILOL 6.25 MG: 6.25 TABLET, FILM COATED ORAL at 07:04

## 2019-11-21 RX ADMIN — EZETIMIBE 10 MG: 10 TABLET ORAL at 09:59

## 2019-11-21 RX ADMIN — HUMAN INSULIN 2 UNITS: 100 INJECTION, SOLUTION SUBCUTANEOUS at 16:30

## 2019-11-21 RX ADMIN — ONDANSETRON 4 MG: 2 INJECTION INTRAMUSCULAR; INTRAVENOUS at 00:30

## 2019-11-21 RX ADMIN — PRAVASTATIN SODIUM 40 MG: 40 TABLET ORAL at 21:46

## 2019-11-21 RX ADMIN — MORPHINE SULFATE 1 MG: 2 INJECTION, SOLUTION INTRAMUSCULAR; INTRAVENOUS at 00:30

## 2019-11-21 RX ADMIN — MORPHINE SULFATE 1 MG: 2 INJECTION, SOLUTION INTRAMUSCULAR; INTRAVENOUS at 10:07

## 2019-11-21 RX ADMIN — ONDANSETRON 4 MG: 2 INJECTION INTRAMUSCULAR; INTRAVENOUS at 15:22

## 2019-11-21 RX ADMIN — PIPERACILLIN AND TAZOBACTAM 3.38 G: 3; .375 INJECTION, POWDER, FOR SOLUTION INTRAVENOUS at 15:17

## 2019-11-21 RX ADMIN — SODIUM CHLORIDE 10 ML: 9 INJECTION INTRAMUSCULAR; INTRAVENOUS; SUBCUTANEOUS at 14:00

## 2019-11-21 RX ADMIN — ONDANSETRON 4 MG: 2 INJECTION INTRAMUSCULAR; INTRAVENOUS at 19:55

## 2019-11-21 RX ADMIN — POLYETHYLENE GLYCOL 3350 17 G: 17 POWDER, FOR SOLUTION ORAL at 10:00

## 2019-11-21 NOTE — PROGRESS NOTES
Bedside shift change report given to Afsaneh Kidd (oncoming nurse) by Shoaib Ramon RN (offgoing nurse). Report included the following information SBAR, Kardex, Intake/Output, MAR and Recent Results.

## 2019-11-21 NOTE — PROGRESS NOTES
KIA:    PT recommends SNF rehab. Patient and daughter chose 3 SNFs for referrals. Freedom of Choice letter in chart. CM sent referrals to [facility], [facility]. [address] accepted patient. Patient's daughter to tour there today.     Familia Brown, RN/CRM

## 2019-11-21 NOTE — DIABETES MGMT
[facility]    [facility] Progress Note    Recommendations/ Comments: Chart review for hypoglycemia. BG 71 mg/dL this AM.   Otherwise within range with the exception of an occasional BG > 180 mg/dL. Noted wide variation in po intake 0 - 100%, Continue to observe to evaluate need for meal time lispro     If appropriate, please consider:  Consider decreasing Lantus to 25 units     Current hospital DM medication:   Lantus, 30 units daily  Novolin R correction scale, normal sensitivity    Chart reviewed on Joann Dsouza. Patient is a 70 y.o. female with known hx of DM on Lantus, 30 units, amaryl, 2 mg TID at home. A1c:   Lab Results   Component Value Date/Time    Hemoglobin A1c 9.7 (H) 11/14/2016 06:00 AM    Hemoglobin A1c 8.3 (H) 09/12/2015 09:18 AM       Recent Glucose Results:   Lab Results   Component Value Date/Time    GLU 82 11/21/2019 04:45 AM    GLUCPOC 113 (H) 11/21/2019 07:51 AM    GLUCPOC 72 11/21/2019 06:42 AM    GLUCPOC 71 11/21/2019 06:27 AM        Lab Results   Component Value Date/Time    Creatinine 0.93 11/21/2019 04:45 AM     Estimated Creatinine Clearance: 55.3 mL/min (based on SCr of 0.93 mg/dL). Active Orders   Diet    DIET GI LITE (POST SURGICAL)        PO intake:   Patient Vitals for the past 72 hrs:   % Diet Eaten   11/20/19 1949 100 %   11/20/19 1300 75 %   11/20/19 0855 0 %   11/19/19 1856 75 %   11/18/19 1232 0 %   11/18/19 1151 0 %       Will continue to follow as needed.     Thank you  Rosalinda Henderson RN, CDE        Time spent: 5 minutes

## 2019-11-21 NOTE — PROGRESS NOTES
Problem: Mobility Impaired (Adult and Pediatric)  Goal: *Acute Goals and Plan of Care (Insert Text)  Description  FUNCTIONAL STATUS PRIOR TO ADMISSION: Patient was modified independent with rollator in her home PTA. Pt did not drive. Children assist with grocery shopping and appointments. HOME SUPPORT PRIOR TO ADMISSION: The patient lived alone with children (x4) in the area. Pt reports completing her ADLs independently. Physical Therapy Goals  Initiated 11/20/2019  1. Patient will move from supine to sit and sit to supine  in bed with supervision/set-up within 7 day(s). 2.  Patient will transfer from bed to chair and chair to bed with supervision/set-up using the least restrictive device within 7 day(s). 3.  Patient will perform sit to stand with supervision/set-up within 7 day(s). 4.  Patient will ambulate with minimal assistance/contact guard assist for 75 feet with the least restrictive device within 7 day(s). 5.  Patient has a ramp per home needs. Outcome: Progressing Towards Goal     PHYSICAL THERAPY TREATMENT  Patient: La Nena Alvares (79 y.o. female)  Date: 11/21/2019  Diagnosis: Dilation of common bile duct [K83.8]   <principal problem not specified>  Procedure(s) (LRB):  CHOLECYSTECTOMY LAPAROSCOPIC (N/A) 2 Days Post-Op  Precautions: Fall  Chart, physical therapy assessment, plan of care and goals were reviewed. ASSESSMENT  Patient continues with skilled PT services and is progressing towards goals. Patient received sitting EOB, reporting fatigue from toileting but agreeable to participate. Overall min A for transfers. Ambulated 5 feet x 2 trials, standing rest break between trials. Min A for ambulation with B HHA. Requires rest breaks due to SOB and LE fatigue. O2 sats 92% on 3L with activity, HR 88bpm.  Remained OOB in chair at end of session. Patient is reporting family unable to assist at discharge, will require SNF.     Current Level of Function Impacting Discharge (mobility/balance): min A, ambulation limited to 5 feet increments    Other factors to consider for discharge:          PLAN :  Patient continues to benefit from skilled intervention to address the above impairments. Continue treatment per established plan of care. to address goals. Recommendation for discharge: (in order for the patient to meet his/her long term goals)  Therapy up to 5 days/week in SNF setting    This discharge recommendation:  Has been made in collaboration with the attending provider and/or case management    IF patient discharges home will need the following DME: to be determined (TBD)       SUBJECTIVE:   Patient stated My family can't help me.     OBJECTIVE DATA SUMMARY:   Critical Behavior:  Neurologic State: Alert, Appropriate for age  Orientation Level: Oriented X4  Cognition: Follows commands  Safety/Judgement: Fall prevention  Functional Mobility Training:  Bed Mobility:     Supine to Sit: (received sitting EOB)              Transfers:  Sit to Stand: Minimum assistance  Stand to Sit: Minimum assistance  Stand Pivot Transfers: Minimum assistance                          Balance:  Sitting: Intact  Standing: Impaired  Standing - Static: Constant support;Good  Standing - Dynamic : Fair;Constant support  Ambulation/Gait Training:  Distance (ft): 5 Feet (ft)(x 2)  Assistive Device: (B HHA)  Ambulation - Level of Assistance: Minimal assistance        Gait Abnormalities: Decreased step clearance; Path deviations;Trunk sway increased        Base of Support: Widened     Speed/[place]: Pace decreased (<100 feet/min)  Step Length: Right shortened;Left shortened                    Stairs: Therapeutic Exercises:     Pain Rating:      Activity Tolerance:   observed SOB with activity-O2 sats stable on 3L  Please refer to the flowsheet for vital signs taken during this treatment.     After treatment patient left in no apparent distress:   Sitting in chair and Call bell within reach    COMMUNICATION/COLLABORATION:   The patients plan of care was discussed with: Registered Nurse    Darrius Shah, PT   Time Calculation: 14 mins

## 2019-11-21 NOTE — PROGRESS NOTES
Hospitalist Progress Note  Kyree Stout MD  Answering service: [phone] OR [phone] from in house phone      Date of Service:  2019  NAME:  Rolando Gongora  :  1940  MRN:  [id number]    Admission Summary:   Patient is a 73-year-old female with medical history significant for hypertension, GERD, type 2 diabetes mellitus, COPD, CADs/p PCI and stents and status post EGD in  remarkable for esophagitis who presents to the emergency department with a chief complaint of worsening abdominal pain. Patient reports ,over the past few months she has been having intermittent crampy, abdominal pain more on the epigastric area , however over the past week it gets worse and since 3 days it became constant and was associated with nausea and nonbloody vomiting and loose stool. She denies any fever, chills, shortness of breath, chest pain, palpitation, dark stool or melena, yellowish discoloration of the eyes, urinary or other bowel complaints. 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     In the emergency department, V/S were unremarkable. Lab: CBC and CMP were unremarkable. CT of the abdomen revealed New choledocholithiasis and CBD dilatation. .  Interval history / Subjective:   Patient seen and examined at bedside.    Feels better today, though still describes some abdominal pain,   tolerating current diet     Assessment & Plan:     choledocholithiasis and CBD dilatation  - mild leukocytosis , liver enzymes and lipase WNL  - CT revealed choledocholithiasis and CBD dilation , consistent with acute cholecystitis  ERCP done  with biliary stent placement  Lap cholecystectomy done   -cont IV zosyn, stop IVFluids    Stable postop course     CAD s/p PCI and stents  - Continue home ASA, BB ACE and studies  Hypertension Continue home meds- Hydralazine as needed. DM2: with hyperglycemia- Hold oral meds- home insulin- SSI- Accu-Checks   COPD stable without exacerbation- cont nebs as needed  HLD- resume home statin med- FTW=970  GERD- cont PPI     Patient's Baseline: Ambulates with walking  DVT ppx: SCD  Code status: Full  Disposition: TBD  Care plan discussed with patient/Family and nurse. Hospital Problems  Date Reviewed: 11/19/2019          Codes Class Noted POA    Dilation of common bile duct ICD-10-CM: K83.8  ICD-9-CM: 576.8  11/16/2019 Unknown            Review of Systems:   Pertinent items are mentioned in interval history. Vital Signs:    Last 24hrs VS reviewed since prior progress note. Most recent are:  Visit Vitals  /68 (BP 1 Location: Left arm, BP Patient Position: At rest)   Pulse 78   Temp 97.8 °F (36.6 °C)   Resp 18   Ht 5' 4\" (1.626 m)   Wt 90.7 kg (200 lb)   SpO2 98%   BMI 34.33 kg/m²         Intake/Output Summary (Last 24 hours) at 11/21/2019 1019  Last data filed at 11/21/2019 0756  Gross per 24 hour   Intake 600 ml   Output 1740 ml   Net -1140 ml        Physical Examination:   General:  Alert, oriented, distress with pain  Resp:  No accessory muscle use, Good sats  Abd:  Soft, WINSTON drain x1, mild distension and tenderness  Extremities:  No cyanosis or clubbing, no significant edema  Neuro:  Grossly normal, no focal neuro deficits, follows commands   Psych:  Good insight, AAOx3, not agitated.     Data Review:    Review and/or order of clinical lab test  Review and/or order of tests in the radiology section of CPT  Review and/or order of tests in the medicine section of CPT  Labs:     Recent Labs     11/21/19 0445 11/20/19  0540   WBC 6.7 10.8   HGB 9.5* 10.2*   HCT 29.5* 32.3*    172     Recent Labs     11/21/19 0445 11/20/19  0540    140   K 3.9 4.4    107   CO2 31 30   BUN 21* 21*   CREA 0.93 1.01   GLU 82 148*   CA 9.4 9.5     Recent Labs     11/21/19 0445 11/20/19  0540 11/19/19  0158   [facility] 53* 47* 56*   ALT 96* 100* 126*    87 83   TBILI 0.6 0.7 1.1*   TP 5.5* 5.7* 6.2*   ALB 2.3* 2.3* 2.7*   GLOB 3.2 3.4 3.5   AML  --   --  14*   LPSE  --   --  40*     No results for input(s): INR, PTP, APTT, INREXT, INREXT in the last 72 hours. No results for input(s): FE, TIBC, PSAT, FERR in the last 72 hours. No results found for: FOL, RBCF   No results for input(s): PH, PCO2, PO2 in the last 72 hours. No results for input(s): CPK, CKNDX, TROIQ in the last 72 hours.     No lab exists for component: CPKMB  Lab Results   Component Value Date/Time    Cholesterol, total 191 09/12/2015 09:18 AM    HDL Cholesterol 45 09/12/2015 09:18 AM    LDL,Direct 121 (H) 07/22/2015 04:00 AM    LDL, calculated 109 (H) 09/12/2015 09:18 AM    Triglyceride 186 (H) 09/12/2015 09:18 AM    CHOL/HDL Ratio 5.3 (H) 07/22/2015 04:00 AM     Lab Results   Component Value Date/Time    Glucose (POC) 113 (H) 11/21/2019 07:51 AM    Glucose (POC) 72 11/21/2019 06:42 AM    Glucose (POC) 71 11/21/2019 06:27 AM    Glucose (POC) 127 (H) 11/20/2019 09:12 PM    Glucose (POC) 212 (H) 11/20/2019 04:32 PM     Lab Results   Component Value Date/Time    Color YELLOW/STRAW 11/16/2019 07:03 PM    Appearance CLOUDY (A) 11/16/2019 07:03 PM    Specific gravity 1.012 11/16/2019 07:03 PM    pH (UA) 7.5 11/16/2019 07:03 PM    Protein NEGATIVE  11/16/2019 07:03 PM    Glucose 100 (A) 11/16/2019 07:03 PM    Ketone NEGATIVE  11/16/2019 07:03 PM    Bilirubin NEGATIVE  11/16/2019 07:03 PM    Urobilinogen 1.0 11/16/2019 07:03 PM    Nitrites NEGATIVE  11/16/2019 07:03 PM    Leukocyte Esterase NEGATIVE  11/16/2019 07:03 PM    Epithelial cells FEW 11/16/2019 07:03 PM    Bacteria NEGATIVE  11/16/2019 07:03 PM    WBC 0-4 11/16/2019 07:03 PM    RBC 0-5 11/16/2019 07:03 PM     Medications Reviewed:     Current Facility-Administered Medications   Medication Dose Route Frequency    senna-docusate (PERICOLACE) 8.6-50 mg per tablet 1 Tab  1 Tab Oral BID    polyethylene glycol (MIRALAX) packet 17 g  17 g Oral DAILY    morphine injection 1 mg  1 mg IntraVENous Q1H PRN    LORazepam (ATIVAN) injection 0.5 mg  0.5 mg IntraVENous Q6H PRN    sodium chloride (NS) flush 5-40 mL  5-40 mL IntraVENous Q8H    sodium chloride (NS) flush 5-40 mL  5-40 mL IntraVENous PRN    piperacillin-tazobactam (ZOSYN) 3.375 g in 0.9% sodium chloride (MBP/ADV) 100 mL  3.375 g IntraVENous Q8H    aspirin chewable tablet 81 mg  81 mg Oral DAILY    carvedilol (COREG) tablet 6.25 mg  6.25 mg Oral BID WITH MEALS    DULoxetine (CYMBALTA) capsule 60 mg  60 mg Oral DAILY    ezetimibe (ZETIA) tablet 10 mg  10 mg Oral DAILY    fenofibrate nanocrystallized (TRICOR) tablet 145 mg  145 mg Oral DAILY    lisinopril (PRINIVIL, ZESTRIL) tablet 5 mg  5 mg Oral DAILY    insulin glargine (LANTUS) injection 30 Units  30 Units SubCUTAneous DAILY    loratadine (CLARITIN) tablet 10 mg  10 mg Oral DAILY PRN    fish oil-omega-3 fatty acids 340-1,000 mg capsule 1 Cap  1 Cap Oral DAILY    pravastatin (PRAVACHOL) tablet 40 mg  40 mg Oral QHS    therapeutic multivitamin (THERAGRAN) tablet 1 Tab  1 Tab Oral DAILY    sodium chloride (NS) flush 5-40 mL  5-40 mL IntraVENous Q8H    sodium chloride (NS) flush 5-40 mL  5-40 mL IntraVENous PRN    ondansetron (ZOFRAN) injection 4 mg  4 mg IntraVENous Q4H PRN    insulin regular (NOVOLIN R, HUMULIN R) injection   SubCUTAneous AC&HS    glucose chewable tablet 16 g  4 Tab Oral PRN    glucagon (GLUCAGEN) injection 1 mg  1 mg IntraMUSCular PRN    dextrose 10% infusion 0-250 mL  0-250 mL IntraVENous PRN    arformoterol (BROVANA) neb solution 15 mcg  15 mcg Nebulization BID RT    And    budesonide (PULMICORT) 500 mcg/2 ml nebulizer suspension  500 mcg Nebulization BID RT    hydrALAZINE (APRESOLINE) 20 mg/mL injection 20 mg  20 mg IntraVENous Q6H PRN   ______________________________________________________________________  EXPECTED LENGTH OF STAY: 2d 7h  ACTUAL LENGTH OF STAY: [facility] Brayan Reynolds MD

## 2019-11-21 NOTE — PROGRESS NOTES
Progress Note    Patient: Kait Pike MRN: [id number]  SSN: [id number]    YOB: 1940  Age: 70 y.o. Sex: female      Admit Date: 2019    2 Days Post-Op    Procedure:  Procedure(s):  CHOLECYSTECTOMY LAPAROSCOPIC    Subjective:     Patient notes improvement in abdominal pain; out of bed in chair yesterday. She is tolerating small amounts of GI Lite diet. Objective:     Visit Vitals  /68 (BP 1 Location: Left arm, BP Patient Position: At rest)   Pulse 78   Temp 97.8 °F (36.6 °C)   Resp 18   Ht 5' 4\" (1.626 m)   Wt 200 lb (90.7 kg)   SpO2 98%   BMI 34.33 kg/m²       Temp (24hrs), Av.2 °F (36.8 °C), Min:97.7 °F (36.5 °C), Max:98.7 °F (37.1 °C)      Physical Exam:    ABDOMEN: Obese, non-distended, soft. Incisions dry and intact. Sero-sanguinous drain fluid. Appropriate incisional pain with palpation. Data Review: VS, I/O's, Labs    Lab Review:   Recent Results (from the past 12 hour(s))   GLUCOSE, POC    Collection Time: 19  9:12 PM   Result Value Ref Range    Glucose (POC) 127 (H) 65 - 100 mg/dL    Performed by Ana Garcia    CBC WITH AUTOMATED DIFF    Collection Time: 19  4:45 AM   Result Value Ref Range    WBC 6.7 3.6 - 11.0 K/uL    RBC 3.10 (L) 3.80 - 5.20 M/uL    HGB 9.5 (L) 11.5 - 16.0 g/dL    HCT 29.5 (L) 35.0 - 47.0 %    MCV 95.2 80.0 - 99.0 FL    MCH 30.6 26.0 - 34.0 PG    MCHC 32.2 30.0 - 36.5 g/dL    RDW 13.4 11.5 - 14.5 %    PLATELET 030 569 - 072 K/uL    MPV 10.0 8.9 - 12.9 FL    NRBC 0.0 0  WBC    ABSOLUTE NRBC 0.00 0.00 - 0.01 K/uL    NEUTROPHILS 61 32 - 75 %    LYMPHOCYTES 22 12 - 49 %    MONOCYTES 14 (H) 5 - 13 %    EOSINOPHILS 2 0 - 7 %    BASOPHILS 0 0 - 1 %    IMMATURE GRANULOCYTES 1 (H) 0.0 - 0.5 %    ABS. NEUTROPHILS 4.1 1.8 - 8.0 K/UL    ABS. LYMPHOCYTES 1.5 0.8 - 3.5 K/UL    ABS. MONOCYTES 0.9 0.0 - 1.0 K/UL    ABS. EOSINOPHILS 0.2 0.0 - 0.4 K/UL    ABS. BASOPHILS 0.0 0.0 - 0.1 K/UL    ABS. IMM.  GRANS. 0.1 (H) 0.00 - 0.04 K/UL    DF AUTOMATED     METABOLIC PANEL, COMPREHENSIVE    Collection Time: 11/21/19  4:45 AM   Result Value Ref Range    Sodium 139 136 - 145 mmol/L    Potassium 3.9 3.5 - 5.1 mmol/L    Chloride 106 97 - 108 mmol/L    CO2 31 21 - 32 mmol/L    Anion gap 2 (L) 5 - 15 mmol/L    Glucose 82 65 - 100 mg/dL    BUN 21 (H) 6 - 20 MG/DL    Creatinine 0.93 0.55 - 1.02 MG/DL    BUN/Creatinine ratio 23 (H) 12 - 20      GFR est AA >60 >60 ml/min/1.73m2    GFR est non-AA 58 (L) >60 ml/min/1.73m2    Calcium 9.4 8.5 - 10.1 MG/DL    Bilirubin, total 0.6 0.2 - 1.0 MG/DL    ALT (SGPT) 96 (H) 12 - 78 U/L    AST (SGOT) 53 (H) 15 - 37 U/L    Alk. phosphatase 115 45 - 117 U/L    Protein, total 5.5 (L) 6.4 - 8.2 g/dL    Albumin 2.3 (L) 3.5 - 5.0 g/dL    Globulin 3.2 2.0 - 4.0 g/dL    A-G Ratio 0.7 (L) 1.1 - 2.2     SAMPLES BEING HELD    Collection Time: 11/21/19  4:45 AM   Result Value Ref Range    SAMPLES BEING HELD 1PST     COMMENT        Add-on orders for these samples will be processed based on acceptable specimen integrity and analyte stability, which may vary by analyte. GLUCOSE, POC    Collection Time: 11/21/19  6:27 AM   Result Value Ref Range    Glucose (POC) 71 65 - 100 mg/dL    Performed by [address] [place] [address], [facility]    Collection Time: 11/21/19  6:42 AM   Result Value Ref Range    Glucose (POC) 72 65 - 100 mg/dL    Performed by Gisele Erazo      Results     Procedure Component Value Units Date/Time    CULTURE, ANAEROBIC [[id number]] Collected:  11/19/19 1340    Order Status:  Canceled     CULTURE, BODY FLUID TIGIST Phillips [[id number]] Collected:  11/19/19 1340    Order Status:  Completed Specimen:  Fluid Updated:  11/20/19 1335     Special Requests: GALLBLADDER FLUID LOOK FOR ANAEROBES     GRAM STAIN RARE WBCS SEEN         NO ORGANISMS SEEN        Culture result:       Specimen not collected anaerobically, recovery of anaerobes may be compromised. Anaerobic screening performed based on source.                   Culture performed on Unspun Fluid            NO GROWTH AFTER 21 HOURS       URINE CULTURE HOLD SAMPLE [[id number]] Collected:  19    Order Status:  Completed Specimen:  Urine from Serum Updated:  19     Urine culture hold       URINE ON HOLD IN MICROBIOLOGY DEPT FOR 3 DAYS. IF UNPRESERVED URINE IS SUBMITTED, IT CANNOT BE USED FOR ADDITIONAL TESTING AFTER 24 HRS, RECOLLECTION WILL BE REQUIRED. Assessment:     Hospital Problems  Date Reviewed: 2019          Codes Class Noted POA    Dilation of common bile duct ICD-10-CM: K83.8  ICD-9-CM: 576.8  2019 Unknown              Plan/Recommendations/Medical Decision Makin. Continue antibiotics. , drain. 2. GI Lite diet. 3. Out of bed in chair, PT.  4. Pulmonary toilet. 5. May need SNF as she lives alone.

## 2019-11-21 NOTE — PROGRESS NOTES
Hospitalist Progress Note  Kyree Stout MD  Answering service: [phone] OR [phone] from in house phone      Date of Service:  2019  NAME:  Rolando Gongora  :  1940  MRN:  [id number]    Admission Summary:   Patient is a 44-year-old female with medical history significant for hypertension, GERD, type 2 diabetes mellitus, COPD, CADs/p PCI and stents and status post EGD in  remarkable for esophagitis who presents to the emergency department with a chief complaint of worsening abdominal pain. Patient reports ,over the past few months she has been having intermittent crampy, abdominal pain more on the epigastric area , however over the past week it gets worse and since 3 days it became constant and was associated with nausea and nonbloody vomiting and loose stool. She denies any fever, chills, shortness of breath, chest pain, palpitation, dark stool or melena, yellowish discoloration of the eyes, urinary or other bowel complaints. 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     In the emergency department, V/S were unremarkable. Lab: CBC and CMP were unremarkable. CT of the abdomen revealed New choledocholithiasis and CBD dilatation. .  Interval history / Subjective:   Patient seen and examined at bedside.    Feels better today, though still describes some abdominal pain,   toleratine current diet     Assessment & Plan:     choledocholithiasis and CBD dilatation  - mild leukocytosis , liver enzymes and lipase WNL  - CT revealed choledocholithiasis and CBD dilation , consistent with acute cholecystitis  ERCP done  with biliary stent placement  Lap cholecystectomy done   -cont  IVF, IV zosyn    Stable postop course     CAD s/p PCI and stents  - Continue home ASA, BB ACE and studies  Hypertension Continue home meds- Hydralazine as needed. DM2: with hyperglycemia- Hold oral meds- home insulin- SSI- Accu-Checks   COPD No exacerbation- BT as needed  HLD- resume home statin med  GERD- cont PPI     Patient's Baseline: Ambulates with walking  DVT ppx: SCD  Code status: Full  Disposition: TBD  Care plan discussed with patient/Family and nurse. Hospital Problems  Date Reviewed: 11/19/2019          Codes Class Noted POA    Dilation of common bile duct ICD-10-CM: K83.8  ICD-9-CM: 576.8  11/16/2019 Unknown            Review of Systems:   Pertinent items are mentioned in interval history. Vital Signs:    Last 24hrs VS reviewed since prior progress note. Most recent are:  Visit Vitals  /62 (BP 1 Location: Left arm)   Pulse 78   Temp 97.7 °F (36.5 °C)   Resp 18   Ht 5' 4\" (1.626 m)   Wt 90.7 kg (200 lb)   SpO2 99%   BMI 34.33 kg/m²         Intake/Output Summary (Last 24 hours) at 11/20/2019 2241  Last data filed at 11/20/2019 1949  Gross per 24 hour   Intake 840 ml   Output 1770 ml   Net -930 ml        Physical Examination:   General:  Alert, oriented, distress with pain  Resp:  No accessory muscle use, Good sats  Abd:  Soft, WINSTON drain x1, mild distension and tenderness  Extremities:  No cyanosis or clubbing, no significant edema  Neuro:  Grossly normal, no focal neuro deficits, follows commands   Psych:  Good insight, AAOx3, not agitated.     Data Review:    Review and/or order of clinical lab test  Review and/or order of tests in the radiology section of CPT  Review and/or order of tests in the medicine section of CPT  Labs:     Recent Labs     11/20/19  0540   WBC 10.8   HGB 10.2*   HCT 32.3*        Recent Labs     11/20/19  0540      K 4.4      CO2 30   BUN 21*   CREA 1.01   *   CA 9.5     Recent Labs     11/20/19  0540 11/19/19  0158 11/18/19  0341   SGOT 47* 56* 137*   * 126* 182*   AP 87 83 77   TBILI 0.7 1.1* 1.3*   TP 5.7* 6.2* 6.0*   ALB 2.3* 2.7* 3.3*   GLOB 3.4 3.5 2.7   AML  --  14* --    LPSE  --  40*  --      No results for input(s): INR, PTP, APTT, INREXT, INREXT in the last 72 hours. No results for input(s): FE, TIBC, PSAT, FERR in the last 72 hours. No results found for: FOL, RBCF   No results for input(s): PH, PCO2, PO2 in the last 72 hours. No results for input(s): CPK, CKNDX, TROIQ in the last 72 hours.     No lab exists for component: CPKMB  Lab Results   Component Value Date/Time    Cholesterol, total 191 09/12/2015 09:18 AM    HDL Cholesterol 45 09/12/2015 09:18 AM    LDL,Direct 121 (H) 07/22/2015 04:00 AM    LDL, calculated 109 (H) 09/12/2015 09:18 AM    Triglyceride 186 (H) 09/12/2015 09:18 AM    CHOL/HDL Ratio 5.3 (H) 07/22/2015 04:00 AM     Lab Results   Component Value Date/Time    Glucose (POC) 127 (H) 11/20/2019 09:12 PM    Glucose (POC) 212 (H) 11/20/2019 04:32 PM    Glucose (POC) 139 (H) 11/20/2019 11:37 AM    Glucose (POC) 135 (H) 11/20/2019 06:31 AM    Glucose (POC) 277 (H) 11/19/2019 09:02 PM     Lab Results   Component Value Date/Time    Color YELLOW/STRAW 11/16/2019 07:03 PM    Appearance CLOUDY (A) 11/16/2019 07:03 PM    Specific gravity 1.012 11/16/2019 07:03 PM    pH (UA) 7.5 11/16/2019 07:03 PM    Protein NEGATIVE  11/16/2019 07:03 PM    Glucose 100 (A) 11/16/2019 07:03 PM    Ketone NEGATIVE  11/16/2019 07:03 PM    Bilirubin NEGATIVE  11/16/2019 07:03 PM    Urobilinogen 1.0 11/16/2019 07:03 PM    Nitrites NEGATIVE  11/16/2019 07:03 PM    Leukocyte Esterase NEGATIVE  11/16/2019 07:03 PM    Epithelial cells FEW 11/16/2019 07:03 PM    Bacteria NEGATIVE  11/16/2019 07:03 PM    WBC 0-4 11/16/2019 07:03 PM    RBC 0-5 11/16/2019 07:03 PM     Medications Reviewed:     Current Facility-Administered Medications   Medication Dose Route Frequency    morphine injection 1 mg  1 mg IntraVENous Q1H PRN    LORazepam (ATIVAN) injection 0.5 mg  0.5 mg IntraVENous Q6H PRN    sodium chloride (NS) flush 5-40 mL  5-40 mL IntraVENous Q8H    sodium chloride (NS) flush 5-40 mL  5-40 mL IntraVENous PRN    lactated Ringers infusion  200 mL/hr IntraVENous CONTINUOUS    piperacillin-tazobactam (ZOSYN) 3.375 g in 0.9% sodium chloride (MBP/ADV) 100 mL  3.375 g IntraVENous Q8H    aspirin chewable tablet 81 mg  81 mg Oral DAILY    carvedilol (COREG) tablet 6.25 mg  6.25 mg Oral BID WITH MEALS    DULoxetine (CYMBALTA) capsule 60 mg  60 mg Oral DAILY    ezetimibe (ZETIA) tablet 10 mg  10 mg Oral DAILY    fenofibrate nanocrystallized (TRICOR) tablet 145 mg  145 mg Oral DAILY    lisinopril (PRINIVIL, ZESTRIL) tablet 5 mg  5 mg Oral DAILY    insulin glargine (LANTUS) injection 30 Units  30 Units SubCUTAneous DAILY    loratadine (CLARITIN) tablet 10 mg  10 mg Oral DAILY PRN    fish oil-omega-3 fatty acids 340-1,000 mg capsule 1 Cap  1 Cap Oral DAILY    pravastatin (PRAVACHOL) tablet 40 mg  40 mg Oral QHS    therapeutic multivitamin (THERAGRAN) tablet 1 Tab  1 Tab Oral DAILY    sodium chloride (NS) flush 5-40 mL  5-40 mL IntraVENous Q8H    sodium chloride (NS) flush 5-40 mL  5-40 mL IntraVENous PRN    ondansetron (ZOFRAN) injection 4 mg  4 mg IntraVENous Q4H PRN    insulin regular (NOVOLIN R, HUMULIN R) injection   SubCUTAneous AC&HS    glucose chewable tablet 16 g  4 Tab Oral PRN    glucagon (GLUCAGEN) injection 1 mg  1 mg IntraMUSCular PRN    dextrose 10% infusion 0-250 mL  0-250 mL IntraVENous PRN    arformoterol (BROVANA) neb solution 15 mcg  15 mcg Nebulization BID RT    And    budesonide (PULMICORT) 500 mcg/2 ml nebulizer suspension  500 mcg Nebulization BID RT    hydrALAZINE (APRESOLINE) 20 mg/mL injection 20 mg  20 mg IntraVENous Q6H PRN   ______________________________________________________________________  EXPECTED LENGTH OF STAY: 2d 7h  ACTUAL LENGTH OF STAY:          4               Daphine Apgar, MD

## 2019-11-21 NOTE — PROGRESS NOTES
Bedside shift change report given to 935 Omkar Sharif (oncoming nurse) by Kate Coronel RN (offgoing nurse). Report included the following information SBAR, Kardex, Intake/Output and MAR.

## 2019-11-22 LAB
ALBUMIN SERPL-MCNC: 2.2 G/DL (ref 3.5–5)
ALBUMIN/GLOB SERPL: 0.7 {RATIO} (ref 1.1–2.2)
ALP SERPL-CCNC: 137 U/L (ref 45–117)
ALT SERPL-CCNC: 130 U/L (ref 12–78)
AST SERPL-CCNC: 81 U/L (ref 15–37)
BILIRUB DIRECT SERPL-MCNC: 0.3 MG/DL (ref 0–0.2)
BILIRUB SERPL-MCNC: 0.5 MG/DL (ref 0.2–1)
GLOBULIN SER CALC-MCNC: 3 G/DL (ref 2–4)
GLUCOSE BLD STRIP.AUTO-MCNC: 102 MG/DL (ref 65–100)
GLUCOSE BLD STRIP.AUTO-MCNC: 153 MG/DL (ref 65–100)
GLUCOSE BLD STRIP.AUTO-MCNC: 70 MG/DL (ref 65–100)
GLUCOSE BLD STRIP.AUTO-MCNC: 90 MG/DL (ref 65–100)
GLUCOSE BLD STRIP.AUTO-MCNC: 99 MG/DL (ref 65–100)
PROT SERPL-MCNC: 5.2 G/DL (ref 6.4–8.2)
SERVICE CMNT-IMP: ABNORMAL
SERVICE CMNT-IMP: ABNORMAL
SERVICE CMNT-IMP: NORMAL

## 2019-11-22 PROCEDURE — 94640 AIRWAY INHALATION TREATMENT: CPT

## 2019-11-22 PROCEDURE — 74011250637 HC RX REV CODE- 250/637: Performed by: FAMILY MEDICINE

## 2019-11-22 PROCEDURE — 65270000032 HC RM SEMIPRIVATE

## 2019-11-22 PROCEDURE — 36415 COLL VENOUS BLD VENIPUNCTURE: CPT

## 2019-11-22 PROCEDURE — 77010033678 HC OXYGEN DAILY

## 2019-11-22 PROCEDURE — 94664 DEMO&/EVAL PT USE INHALER: CPT

## 2019-11-22 PROCEDURE — 74011000250 HC RX REV CODE- 250: Performed by: SURGERY

## 2019-11-22 PROCEDURE — 74011636637 HC RX REV CODE- 636/637: Performed by: SURGERY

## 2019-11-22 PROCEDURE — 80076 HEPATIC FUNCTION PANEL: CPT

## 2019-11-22 PROCEDURE — 97116 GAIT TRAINING THERAPY: CPT

## 2019-11-22 PROCEDURE — 74011250636 HC RX REV CODE- 250/636: Performed by: SURGERY

## 2019-11-22 PROCEDURE — 74011000258 HC RX REV CODE- 258: Performed by: SURGERY

## 2019-11-22 PROCEDURE — 97530 THERAPEUTIC ACTIVITIES: CPT

## 2019-11-22 PROCEDURE — 74011250637 HC RX REV CODE- 250/637: Performed by: SURGERY

## 2019-11-22 PROCEDURE — 82962 GLUCOSE BLOOD TEST: CPT

## 2019-11-22 RX ORDER — INSULIN GLARGINE 100 [IU]/ML
27 INJECTION, SOLUTION SUBCUTANEOUS DAILY
Status: DISCONTINUED | OUTPATIENT
Start: 2019-11-23 | End: 2019-11-25 | Stop reason: HOSPADM

## 2019-11-22 RX ORDER — AMOXICILLIN AND CLAVULANATE POTASSIUM 875; 125 MG/1; MG/1
1 TABLET, FILM COATED ORAL EVERY 12 HOURS
Status: DISCONTINUED | OUTPATIENT
Start: 2019-11-22 | End: 2019-11-25 | Stop reason: HOSPADM

## 2019-11-22 RX ORDER — GUAIFENESIN 600 MG/1
600 TABLET, EXTENDED RELEASE ORAL EVERY 12 HOURS
Status: DISCONTINUED | OUTPATIENT
Start: 2019-11-22 | End: 2019-11-25 | Stop reason: HOSPADM

## 2019-11-22 RX ADMIN — ONDANSETRON 4 MG: 2 INJECTION INTRAMUSCULAR; INTRAVENOUS at 14:13

## 2019-11-22 RX ADMIN — DULOXETINE HYDROCHLORIDE 60 MG: 60 CAPSULE, DELAYED RELEASE ORAL at 09:23

## 2019-11-22 RX ADMIN — FENOFIBRATE 145 MG: 145 TABLET ORAL at 09:22

## 2019-11-22 RX ADMIN — BUDESONIDE 500 MCG: 0.5 INHALANT RESPIRATORY (INHALATION) at 07:37

## 2019-11-22 RX ADMIN — AMOXICILLIN AND CLAVULANATE POTASSIUM 1 TABLET: 875; 125 TABLET, FILM COATED ORAL at 09:21

## 2019-11-22 RX ADMIN — EZETIMIBE 10 MG: 10 TABLET ORAL at 09:23

## 2019-11-22 RX ADMIN — ONDANSETRON 4 MG: 2 INJECTION INTRAMUSCULAR; INTRAVENOUS at 02:24

## 2019-11-22 RX ADMIN — BUDESONIDE 500 MCG: 0.5 INHALANT RESPIRATORY (INHALATION) at 20:56

## 2019-11-22 RX ADMIN — AMOXICILLIN AND CLAVULANATE POTASSIUM 1 TABLET: 875; 125 TABLET, FILM COATED ORAL at 22:23

## 2019-11-22 RX ADMIN — PIPERACILLIN AND TAZOBACTAM 3.38 G: 3; .375 INJECTION, POWDER, FOR SOLUTION INTRAVENOUS at 07:28

## 2019-11-22 RX ADMIN — SODIUM CHLORIDE 10 ML: 9 INJECTION INTRAMUSCULAR; INTRAVENOUS; SUBCUTANEOUS at 13:11

## 2019-11-22 RX ADMIN — GUAIFENESIN 600 MG: 600 TABLET, EXTENDED RELEASE ORAL at 22:23

## 2019-11-22 RX ADMIN — ASPIRIN 81 MG CHEWABLE TABLET 81 MG: 81 TABLET CHEWABLE at 09:22

## 2019-11-22 RX ADMIN — POLYETHYLENE GLYCOL 3350 17 G: 17 POWDER, FOR SOLUTION ORAL at 09:23

## 2019-11-22 RX ADMIN — INSULIN GLARGINE 30 UNITS: 100 INJECTION, SOLUTION SUBCUTANEOUS at 13:10

## 2019-11-22 RX ADMIN — SODIUM CHLORIDE 10 ML: 9 INJECTION INTRAMUSCULAR; INTRAVENOUS; SUBCUTANEOUS at 22:00

## 2019-11-22 RX ADMIN — CARVEDILOL 6.25 MG: 6.25 TABLET, FILM COATED ORAL at 17:13

## 2019-11-22 RX ADMIN — MORPHINE SULFATE 15 MG: 15 TABLET ORAL at 02:22

## 2019-11-22 RX ADMIN — STANDARDIZED SENNA CONCENTRATE AND DOCUSATE SODIUM 1 TABLET: 8.6; 5 TABLET ORAL at 09:21

## 2019-11-22 RX ADMIN — LACTULOSE 45 ML: 20 SOLUTION ORAL at 17:12

## 2019-11-22 RX ADMIN — SODIUM CHLORIDE 10 ML: 9 INJECTION INTRAMUSCULAR; INTRAVENOUS; SUBCUTANEOUS at 07:27

## 2019-11-22 RX ADMIN — Medication 1 CAPSULE: at 09:22

## 2019-11-22 RX ADMIN — ARFORMOTEROL TARTRATE 15 MCG: 15 SOLUTION RESPIRATORY (INHALATION) at 07:37

## 2019-11-22 RX ADMIN — STANDARDIZED SENNA CONCENTRATE AND DOCUSATE SODIUM 1 TABLET: 8.6; 5 TABLET ORAL at 17:13

## 2019-11-22 RX ADMIN — HUMAN INSULIN 2 UNITS: 100 INJECTION, SOLUTION SUBCUTANEOUS at 13:10

## 2019-11-22 RX ADMIN — SODIUM CHLORIDE 10 ML: 9 INJECTION INTRAMUSCULAR; INTRAVENOUS; SUBCUTANEOUS at 22:23

## 2019-11-22 RX ADMIN — PRAVASTATIN SODIUM 40 MG: 40 TABLET ORAL at 22:23

## 2019-11-22 RX ADMIN — ARFORMOTEROL TARTRATE 15 MCG: 15 SOLUTION RESPIRATORY (INHALATION) at 20:56

## 2019-11-22 RX ADMIN — THERA TABS 1 TABLET: TAB at 09:23

## 2019-11-22 NOTE — PROGRESS NOTES
Problem: Pressure Injury - Risk of  Goal: *Prevention of pressure injury  Description  Document Fam Scale and appropriate interventions in the flowsheet. 11/22/2019 0838 by Johnathan Tesfaye RN  Outcome: Progressing Towards Goal  Note: Pressure Injury Interventions: Activity Interventions: Increase time out of bed    Mobility Interventions: Pressure redistribution bed/mattress (bed type)    Nutrition Interventions: Document food/fluid/supplement intake                  11/21/2019 1938 by Johnathan Tesfaye RN  Outcome: Progressing Towards Goal  Note: Pressure Injury Interventions: Activity Interventions: Increase time out of bed, Pressure redistribution bed/mattress(bed type), PT/OT evaluation    Mobility Interventions: Pressure redistribution bed/mattress (bed type), HOB 30 degrees or less    Nutrition Interventions: Document food/fluid/supplement intake                     Problem: Patient Education: Go to Patient Education Activity  Goal: Patient/Family Education  11/22/2019 0838 by Johanthan Tesfaye RN  Outcome: Progressing Towards Goal  11/21/2019 1938 by Johnathan Tesfaye RN  Outcome: Progressing Towards Goal     Problem: Falls - Risk of  Goal: *Absence of Falls  Description  Document Reanna Ruts Fall Risk and appropriate interventions in the flowsheet.   11/22/2019 0838 by Johnathan Tesfaye RN  Outcome: Progressing Towards Goal  Note: Fall Risk Interventions:  Mobility Interventions: Patient to call before getting OOB, Communicate number of staff needed for ambulation/transfer         Medication Interventions: Patient to call before getting OOB, Teach patient to arise slowly    Elimination Interventions: Call light in reach    History of Falls Interventions: Door open when patient unattended      11/21/2019 1938 by Johnathan Tesfaye RN  Outcome: Progressing Towards Goal  Note: Fall Risk Interventions:  Mobility Interventions: Patient to call before getting OOB, Communicate number of staff needed for ambulation/transfer         Medication Interventions: Patient to call before getting OOB, Teach patient to arise slowly    Elimination Interventions: Call light in reach, Patient to call for help with toileting needs    History of Falls Interventions: Door open when patient unattended         Problem: Patient Education: Go to Patient Education Activity  Goal: Patient/Family Education  11/22/2019 0838 by Feng Jewell RN  Outcome: Progressing Towards Goal  11/21/2019 1938 by Feng Jewell RN  Outcome: Progressing Towards Goal     Problem: Pain  Goal: *Control of Pain  11/22/2019 0838 by Feng Jewell RN  Outcome: Progressing Towards Goal  11/21/2019 1938 by Feng Jewell, RN  Outcome: Progressing Towards Goal

## 2019-11-22 NOTE — PROGRESS NOTES
Problem: Mobility Impaired (Adult and Pediatric)  Goal: *Acute Goals and Plan of Care (Insert Text)  Description  FUNCTIONAL STATUS PRIOR TO ADMISSION: Patient was modified independent with rollator in her home PTA. Pt did not drive. Children assist with grocery shopping and appointments. HOME SUPPORT PRIOR TO ADMISSION: The patient lived alone with children (x4) in the area. Pt reports completing her ADLs independently. Physical Therapy Goals  Initiated 11/20/2019  1. Patient will move from supine to sit and sit to supine  in bed with supervision/set-up within 7 day(s). 2.  Patient will transfer from bed to chair and chair to bed with supervision/set-up using the least restrictive device within 7 day(s). 3.  Patient will perform sit to stand with supervision/set-up within 7 day(s). 4.  Patient will ambulate with minimal assistance/contact guard assist for 75 feet with the least restrictive device within 7 day(s). 5.  Patient has a ramp per home needs. Outcome: Progressing Towards Goal   PHYSICAL THERAPY TREATMENT  Patient: Yahaira Pearson (11 y.o. female)  Date: 11/22/2019  Diagnosis: Dilation of common bile duct [K83.8]   <principal problem not specified>  Procedure(s) (LRB):  CHOLECYSTECTOMY LAPAROSCOPIC (N/A) 3 Days Post-Op  Precautions: Fall  Chart, physical therapy assessment, plan of care and goals were reviewed. ASSESSMENT  Patient continues with skilled PT services and is progressing towards goals. Patient needed min assist for supine to sit. She was able to come to stand from multiple surfaces with min assist to CGA. After using bedside commode, patient able to ambulate 40 feet using a walker with min assist mostly for walker and line management.      Current Level of Function Impacting Discharge (mobility/balance): Min assist for transfers and ambulation    Other factors to consider for discharge:          PLAN :  Patient continues to benefit from skilled intervention to address the above impairments. Continue treatment per established plan of care. to address goals. Recommendation for discharge: (in order for the patient to meet his/her long term goals)  Therapy up to 5 days/week in SNF setting    This discharge recommendation:  A follow-up discussion with the attending provider and/or case management is planned    IF patient discharges home will need the following DME: will continue to assess       SUBJECTIVE:   Patient stated I am getting stronger.     OBJECTIVE DATA SUMMARY:   Critical Behavior:  Neurologic State: Alert  Orientation Level: Oriented X4  Cognition: Follows commands  Safety/Judgement: Fall prevention  Functional Mobility Training:  Bed Mobility:   Supine to Sit: Minimum assistance; Additional time   Transfers:  Sit to Stand: Minimum assistance  Stand to Sit: Contact guard assistance      Bed to Chair: Minimum assistance(using walker to bedside commode)         Balance:  Sitting: Intact; Without support  Standing: Impaired; With support  Standing - Static: Fair  Standing - Dynamic : Fair  Ambulation/Gait Training:  Distance (ft): 40 Feet (ft)  Assistive Device: Gait belt;Walker, rolling  Ambulation - Level of Assistance: Minimal assistance(mostly for line and walker managment)   Gait Abnormalities: Decreased step clearance; Other(flexed posture)   Speed/[place]: Slow  Step Length: Right shortened;Left shortened        Therapeutic Exercises: Ankle pumps  Pain Ratin at incision site    Activity Tolerance:   desaturates with exertion and requires oxygen  Please refer to the flowsheet for vital signs taken during this treatment.     After treatment patient left in no apparent distress:   Sitting in chair and Call bell within reach    COMMUNICATION/COLLABORATION:   The patients plan of care was discussed with: Registered Nurse    Georgina Lemon, PT   Time Calculation: 23 mins

## 2019-11-22 NOTE — PROGRESS NOTES
KIA:    Possible discharge tomorrow. Patient accepted at [facility]. [facility] on will-call for tomorrow. CM called Kevon Bradley in Admissions to confirm. Pt. Will be in room 609. Daughter, Rocío Willis notified of plans. [phone]. Call report to [phone] ask for [facility] AT Critical access hospital Nurse station.     Fax discharge info to [phone]    Anselmo Claudio, RN/CRM

## 2019-11-22 NOTE — PROGRESS NOTES
Bedside shift change report given to [employer] (oncoming nurse) by Murel Duane (offgoing nurse). Report included the following information SBAR, Kardex, Intake/Output, MAR and Recent Results.

## 2019-11-22 NOTE — DIABETES MGMT
[facility]    [facility]TC Progress Note    Recommendations/ Comments: Pt with hypoglycemia again this am.  If appropriate, please consider decreasing Lantus to 25 units. Current hospital DM medication: lantus 30 units daily; lispro insulin correction scale    Chart reviewed on Joann Dsouza. Patient is a 70 y.o. female with known hx of DM on Lantus, 30 units, amaryl, 2 mg TID at home. A1c:   Lab Results   Component Value Date/Time    Hemoglobin A1c 9.7 (H) 11/14/2016 06:00 AM    Hemoglobin A1c 8.3 (H) 09/12/2015 09:18 AM       Recent Glucose Results:   Lab Results   Component Value Date/Time    GLUCPOC 90 11/22/2019 07:26 AM    GLUCPOC 70 11/22/2019 06:35 AM    GLUCPOC 151 (H) 11/21/2019 09:31 PM        Lab Results   Component Value Date/Time    Creatinine 0.93 11/21/2019 04:45 AM     Estimated Creatinine Clearance: 55.3 mL/min (based on SCr of 0.93 mg/dL). Active Orders   Diet    DIET DIABETIC WITH OPTIONS Consistent Carb 1800kcal; Regular; 50GM Fat; Low Fiber        PO intake:   Patient Vitals for the past 72 hrs:   % Diet Eaten   11/20/19 1949 100 %   11/20/19 1300 75 %   11/20/19 0855 0 %   11/19/19 1856 75 %       Will continue to follow as needed.     Thank you          Time spent: 2 min

## 2019-11-22 NOTE — PROGRESS NOTES
Problem: Pressure Injury - Risk of  Goal: *Prevention of pressure injury  Description  Document Fam Scale and appropriate interventions in the flowsheet. Outcome: Progressing Towards Goal  Note: Pressure Injury Interventions: Activity Interventions: Increase time out of bed, Pressure redistribution bed/mattress(bed type), PT/OT evaluation    Mobility Interventions: Pressure redistribution bed/mattress (bed type), HOB 30 degrees or less    Nutrition Interventions: Document food/fluid/supplement intake                     Problem: Patient Education: Go to Patient Education Activity  Goal: Patient/Family Education  Outcome: Progressing Towards Goal     Problem: Falls - Risk of  Goal: *Absence of Falls  Description  Document Nuha Ballard Fall Risk and appropriate interventions in the flowsheet.   Outcome: Progressing Towards Goal  Note: Fall Risk Interventions:  Mobility Interventions: Patient to call before getting OOB, Communicate number of staff needed for ambulation/transfer         Medication Interventions: Patient to call before getting OOB, Teach patient to arise slowly    Elimination Interventions: Call light in reach, Patient to call for help with toileting needs    History of Falls Interventions: Door open when patient unattended         Problem: Patient Education: Go to Patient Education Activity  Goal: Patient/Family Education  Outcome: Progressing Towards Goal     Problem: Pain  Goal: *Control of Pain  Outcome: Progressing Towards Goal

## 2019-11-22 NOTE — PROGRESS NOTES
Progress Note    Patient: Yakelin Gerber MRN: [id number]  SSN: [id number]    YOB: 1940  Age: 70 y.o. Sex: female      Admit Date: 2019    3 Days Post-Op    Procedure:  Procedure(s):  CHOLECYSTECTOMY LAPAROSCOPIC    Subjective:     Patient with low blood sugar overnight, better with intervention; out of bed in chair yesterday. She is tolerating small amounts of GI Lite diet. Objective:     Visit Vitals  /72   Pulse 66   Temp 97.4 °F (36.3 °C)   Resp 16   Ht 5' 4\" (1.626 m)   Wt 200 lb (90.7 kg)   SpO2 97%   BMI 34.33 kg/m²       Temp (24hrs), Av.7 °F (36.5 °C), Min:97.4 °F (36.3 °C), Max:98.1 °F (36.7 °C)      Physical Exam:    ABDOMEN: Obese, non-distended, soft. Incisions dry and intact. Sero-sanguinous drain fluid. Appropriate incisional pain with palpation. Data Review: VS, I/O's, Labs    Lab Review:   Recent Results (from the past 12 hour(s))   GLUCOSE, POC    Collection Time: 19  9:31 PM   Result Value Ref Range    Glucose (POC) 151 (H) 65 - 100 mg/dL    Performed by Elías Vance    HEPATIC FUNCTION PANEL    Collection Time: 19  2:10 AM   Result Value Ref Range    Protein, total 5.2 (L) 6.4 - 8.2 g/dL    Albumin 2.2 (L) 3.5 - 5.0 g/dL    Globulin 3.0 2.0 - 4.0 g/dL    A-G Ratio 0.7 (L) 1.1 - 2.2      Bilirubin, total 0.5 0.2 - 1.0 MG/DL    Bilirubin, direct 0.3 (H) 0.0 - 0.2 MG/DL    Alk.  phosphatase 137 (H) 45 - 117 U/L    AST (SGOT) 81 (H) 15 - 37 U/L    ALT (SGPT) 130 (H) 12 - 78 U/L   GLUCOSE, POC    Collection Time: 19  6:35 AM   Result Value Ref Range    Glucose (POC) 70 65 - 100 mg/dL    Performed by [address] [place] [address], POC    Collection Time: 19  7:26 AM   Result Value Ref Range    Glucose (POC) 90 65 - 100 mg/dL    Performed by Scarlett Alvarado      Results     Procedure Component Value Units Date/Time    CULTURE, ANAEROBIC [[id number]] Collected:  19 1340    Order Status:  Canceled     CULTURE, BODY FLUID W GRAM STAIN [[id number]] Collected:  19 1340    Order Status:  Completed Specimen:  Fluid Updated:  19 1245     Special Requests: GALLBLADDER FLUID LOOK FOR ANAEROBES     GRAM STAIN RARE WBCS SEEN         NO ORGANISMS SEEN        Culture result:       Specimen not collected anaerobically, recovery of anaerobes may be compromised. Anaerobic screening performed based on source. Culture performed on Unspun Fluid            NO GROWTH 2 DAYS       URINE CULTURE HOLD SAMPLE [[id number]] Collected:  19 1903    Order Status:  Completed Specimen:  Urine from Serum Updated:  19     Urine culture hold       URINE ON HOLD IN MICROBIOLOGY DEPT FOR 3 DAYS. IF UNPRESERVED URINE IS SUBMITTED, IT CANNOT BE USED FOR ADDITIONAL TESTING AFTER 24 HRS, RECOLLECTION WILL BE REQUIRED. Assessment:     Hospital Problems  Date Reviewed: 2019          Codes Class Noted POA    Dilation of common bile duct ICD-10-CM: K83.8  ICD-9-CM: 576.8  2019 Unknown              Plan/Recommendations/Medical Decision Makin. Continue antibiotics; can transition to oral (Augmentin); remove drain. 2. GI Lite diet. 3. Out of bed in chair, PT.  4. Pulmonary toilet. 5. SNF evaluation in progress.

## 2019-11-23 ENCOUNTER — APPOINTMENT (OUTPATIENT)
Dept: GENERAL RADIOLOGY | Age: 79
DRG: 418 | End: 2019-11-23
Attending: FAMILY MEDICINE
Payer: MEDICARE

## 2019-11-23 LAB
ALBUMIN SERPL-MCNC: 2.5 G/DL (ref 3.5–5)
ALBUMIN/GLOB SERPL: 0.7 {RATIO} (ref 1.1–2.2)
ALP SERPL-CCNC: 135 U/L (ref 45–117)
ALT SERPL-CCNC: 107 U/L (ref 12–78)
ANION GAP SERPL CALC-SCNC: 3 MMOL/L (ref 5–15)
AST SERPL-CCNC: 41 U/L (ref 15–37)
BACTERIA SPEC CULT: NORMAL
BILIRUB DIRECT SERPL-MCNC: 0.3 MG/DL (ref 0–0.2)
BILIRUB SERPL-MCNC: 0.6 MG/DL (ref 0.2–1)
BUN SERPL-MCNC: 12 MG/DL (ref 6–20)
BUN/CREAT SERPL: 14 (ref 12–20)
CALCIUM SERPL-MCNC: 9.8 MG/DL (ref 8.5–10.1)
CHLORIDE SERPL-SCNC: 99 MMOL/L (ref 97–108)
CO2 SERPL-SCNC: 38 MMOL/L (ref 21–32)
CREAT SERPL-MCNC: 0.87 MG/DL (ref 0.55–1.02)
ERYTHROCYTE [DISTWIDTH] IN BLOOD BY AUTOMATED COUNT: 13.2 % (ref 11.5–14.5)
GLOBULIN SER CALC-MCNC: 3.6 G/DL (ref 2–4)
GLUCOSE BLD STRIP.AUTO-MCNC: 172 MG/DL (ref 65–100)
GLUCOSE BLD STRIP.AUTO-MCNC: 179 MG/DL (ref 65–100)
GLUCOSE BLD STRIP.AUTO-MCNC: 186 MG/DL (ref 65–100)
GLUCOSE BLD STRIP.AUTO-MCNC: 64 MG/DL (ref 65–100)
GLUCOSE BLD STRIP.AUTO-MCNC: 97 MG/DL (ref 65–100)
GLUCOSE SERPL-MCNC: 62 MG/DL (ref 65–100)
GRAM STN SPEC: NORMAL
GRAM STN SPEC: NORMAL
HCT VFR BLD AUTO: 36.5 % (ref 35–47)
HGB BLD-MCNC: 12 G/DL (ref 11.5–16)
MAGNESIUM SERPL-MCNC: 1.2 MG/DL (ref 1.6–2.4)
MCH RBC QN AUTO: 30.6 PG (ref 26–34)
MCHC RBC AUTO-ENTMCNC: 32.9 G/DL (ref 30–36.5)
MCV RBC AUTO: 93.1 FL (ref 80–99)
NRBC # BLD: 0 K/UL (ref 0–0.01)
NRBC BLD-RTO: 0 PER 100 WBC
PLATELET # BLD AUTO: 212 K/UL (ref 150–400)
PMV BLD AUTO: 9.7 FL (ref 8.9–12.9)
POTASSIUM SERPL-SCNC: 4.4 MMOL/L (ref 3.5–5.1)
PROT SERPL-MCNC: 6.1 G/DL (ref 6.4–8.2)
RBC # BLD AUTO: 3.92 M/UL (ref 3.8–5.2)
SERVICE CMNT-IMP: ABNORMAL
SERVICE CMNT-IMP: NORMAL
SERVICE CMNT-IMP: NORMAL
SODIUM SERPL-SCNC: 140 MMOL/L (ref 136–145)
TSH SERPL DL<=0.05 MIU/L-ACNC: 2.75 UIU/ML (ref 0.36–3.74)
WBC # BLD AUTO: 7.5 K/UL (ref 3.6–11)

## 2019-11-23 PROCEDURE — 94640 AIRWAY INHALATION TREATMENT: CPT

## 2019-11-23 PROCEDURE — 74011000250 HC RX REV CODE- 250: Performed by: SURGERY

## 2019-11-23 PROCEDURE — 94664 DEMO&/EVAL PT USE INHALER: CPT

## 2019-11-23 PROCEDURE — 74011250637 HC RX REV CODE- 250/637: Performed by: SURGERY

## 2019-11-23 PROCEDURE — 65270000032 HC RM SEMIPRIVATE

## 2019-11-23 PROCEDURE — 83735 ASSAY OF MAGNESIUM: CPT

## 2019-11-23 PROCEDURE — 71045 X-RAY EXAM CHEST 1 VIEW: CPT

## 2019-11-23 PROCEDURE — 74011250637 HC RX REV CODE- 250/637: Performed by: FAMILY MEDICINE

## 2019-11-23 PROCEDURE — 85027 COMPLETE CBC AUTOMATED: CPT

## 2019-11-23 PROCEDURE — 82962 GLUCOSE BLOOD TEST: CPT

## 2019-11-23 PROCEDURE — 80048 BASIC METABOLIC PNL TOTAL CA: CPT

## 2019-11-23 PROCEDURE — 94760 N-INVAS EAR/PLS OXIMETRY 1: CPT

## 2019-11-23 PROCEDURE — 74011636637 HC RX REV CODE- 636/637: Performed by: SURGERY

## 2019-11-23 PROCEDURE — 80076 HEPATIC FUNCTION PANEL: CPT

## 2019-11-23 PROCEDURE — 74011250636 HC RX REV CODE- 250/636: Performed by: SURGERY

## 2019-11-23 PROCEDURE — 74011250636 HC RX REV CODE- 250/636: Performed by: FAMILY MEDICINE

## 2019-11-23 PROCEDURE — 36415 COLL VENOUS BLD VENIPUNCTURE: CPT

## 2019-11-23 PROCEDURE — 84443 ASSAY THYROID STIM HORMONE: CPT

## 2019-11-23 PROCEDURE — 77010033678 HC OXYGEN DAILY

## 2019-11-23 RX ORDER — LANOLIN ALCOHOL/MO/W.PET/CERES
400 CREAM (GRAM) TOPICAL 2 TIMES DAILY
Status: DISCONTINUED | OUTPATIENT
Start: 2019-11-23 | End: 2019-11-25 | Stop reason: HOSPADM

## 2019-11-23 RX ORDER — MAGNESIUM SULFATE HEPTAHYDRATE 40 MG/ML
2 INJECTION, SOLUTION INTRAVENOUS ONCE
Status: COMPLETED | OUTPATIENT
Start: 2019-11-23 | End: 2019-11-23

## 2019-11-23 RX ADMIN — PRAVASTATIN SODIUM 40 MG: 40 TABLET ORAL at 21:12

## 2019-11-23 RX ADMIN — MAGNESIUM OXIDE TAB 400 MG (241.3 MG ELEMENTAL MG) 400 MG: 400 (241.3 MG) TAB at 09:03

## 2019-11-23 RX ADMIN — FENOFIBRATE 145 MG: 145 TABLET ORAL at 09:03

## 2019-11-23 RX ADMIN — SODIUM CHLORIDE 10 ML: 9 INJECTION INTRAMUSCULAR; INTRAVENOUS; SUBCUTANEOUS at 21:14

## 2019-11-23 RX ADMIN — ARFORMOTEROL TARTRATE 15 MCG: 15 SOLUTION RESPIRATORY (INHALATION) at 21:27

## 2019-11-23 RX ADMIN — BUDESONIDE 500 MCG: 0.5 INHALANT RESPIRATORY (INHALATION) at 21:27

## 2019-11-23 RX ADMIN — SODIUM CHLORIDE 10 ML: 9 INJECTION INTRAMUSCULAR; INTRAVENOUS; SUBCUTANEOUS at 06:00

## 2019-11-23 RX ADMIN — MORPHINE SULFATE 15 MG: 15 TABLET ORAL at 22:55

## 2019-11-23 RX ADMIN — BUDESONIDE 500 MCG: 0.5 INHALANT RESPIRATORY (INHALATION) at 07:58

## 2019-11-23 RX ADMIN — GUAIFENESIN 600 MG: 600 TABLET, EXTENDED RELEASE ORAL at 20:43

## 2019-11-23 RX ADMIN — CARVEDILOL 6.25 MG: 6.25 TABLET, FILM COATED ORAL at 16:03

## 2019-11-23 RX ADMIN — SODIUM CHLORIDE 10 ML: 9 INJECTION INTRAMUSCULAR; INTRAVENOUS; SUBCUTANEOUS at 21:12

## 2019-11-23 RX ADMIN — AMOXICILLIN AND CLAVULANATE POTASSIUM 1 TABLET: 875; 125 TABLET, FILM COATED ORAL at 09:03

## 2019-11-23 RX ADMIN — MAGNESIUM SULFATE HEPTAHYDRATE 2 G: 40 INJECTION, SOLUTION INTRAVENOUS at 08:10

## 2019-11-23 RX ADMIN — AMOXICILLIN AND CLAVULANATE POTASSIUM 1 TABLET: 875; 125 TABLET, FILM COATED ORAL at 20:43

## 2019-11-23 RX ADMIN — GUAIFENESIN 600 MG: 600 TABLET, EXTENDED RELEASE ORAL at 09:02

## 2019-11-23 RX ADMIN — HUMAN INSULIN 2 UNITS: 100 INJECTION, SOLUTION SUBCUTANEOUS at 17:38

## 2019-11-23 RX ADMIN — LISINOPRIL 5 MG: 5 TABLET ORAL at 09:03

## 2019-11-23 RX ADMIN — ASPIRIN 81 MG CHEWABLE TABLET 81 MG: 81 TABLET CHEWABLE at 09:02

## 2019-11-23 RX ADMIN — MAGNESIUM OXIDE TAB 400 MG (241.3 MG ELEMENTAL MG) 400 MG: 400 (241.3 MG) TAB at 18:00

## 2019-11-23 RX ADMIN — ARFORMOTEROL TARTRATE 15 MCG: 15 SOLUTION RESPIRATORY (INHALATION) at 07:58

## 2019-11-23 RX ADMIN — DULOXETINE HYDROCHLORIDE 60 MG: 60 CAPSULE, DELAYED RELEASE ORAL at 09:02

## 2019-11-23 RX ADMIN — EZETIMIBE 10 MG: 10 TABLET ORAL at 09:03

## 2019-11-23 RX ADMIN — CARVEDILOL 6.25 MG: 6.25 TABLET, FILM COATED ORAL at 07:21

## 2019-11-23 RX ADMIN — Medication 1 CAPSULE: at 09:03

## 2019-11-23 RX ADMIN — HUMAN INSULIN 2 UNITS: 100 INJECTION, SOLUTION SUBCUTANEOUS at 11:15

## 2019-11-23 RX ADMIN — ONDANSETRON 4 MG: 2 INJECTION INTRAMUSCULAR; INTRAVENOUS at 20:47

## 2019-11-23 RX ADMIN — THERA TABS 1 TABLET: TAB at 09:03

## 2019-11-23 NOTE — PROGRESS NOTES
Hospitalist Progress Note  Jeanne Dalton MD  Answering service: [phone] OR [phone] from in house phone      Date of Service:  2019  NAME:  Meghana Higginbotham  :  1940  MRN:  [id number]    Admission Summary:   Patient is a 68-year-old female with medical history significant for hypertension, GERD, type 2 diabetes mellitus, COPD, CADs/p PCI and stents and status post EGD in  remarkable for esophagitis who presents to the emergency department with a chief complaint of worsening abdominal pain. Patient reports ,over the past few months she has been having intermittent crampy, abdominal pain more on the epigastric area , however over the past week it gets worse and since 3 days it became constant and was associated with nausea and nonbloody vomiting and loose stool. She denies any fever, chills, shortness of breath, chest pain, palpitation, dark stool or melena, yellowish discoloration of the eyes, urinary or other bowel complaints. 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     In the emergency department, V/S were unremarkable. Lab: CBC and CMP were unremarkable. CT of the abdomen revealed New choledocholithiasis and CBD dilatation. .  Interval history / Subjective:   Patient seen and examined at bedside.    Feels better today, though still describes some abdominal pain,   tolerating current diet     Assessment & Plan:     choledocholithiasis and CBD dilatation  - mild leukocytosis , liver enzymes and lipase WNL  - CT revealed choledocholithiasis and CBD dilation , consistent with acute cholecystitis  ERCP done  with biliary stent placement  Lap cholecystectomy done   -switched from  IV zosyn to PO augmentin    Stable postop course     CAD s/p PCI and stents  - Continue home ASA, BB ACE and studies  Hypertension Continue home meds- Hydralazine as needed. DM2: with hyperglycemia- Hold oral meds- home insulin- SSI- Accu-Checks   COPD stable without exacerbation- cont nebs as needed  HLD- resume home statin med- JFQ=018  GERD- cont PPI     Patient's Baseline: Ambulates with walking  DVT ppx: SCD  Code status: Full  Disposition: TBD  Care plan discussed with patient/Family and nurse. Hospital Problems  Date Reviewed: 11/19/2019          Codes Class Noted POA    Dilation of common bile duct ICD-10-CM: K83.8  ICD-9-CM: 576.8  11/16/2019 Unknown            Review of Systems:   Pertinent items are mentioned in interval history. Vital Signs:    Last 24hrs VS reviewed since prior progress note. Most recent are:  Visit Vitals  /75   Pulse 77   Temp 98.6 °F (37 °C)   Resp 17   Ht 5' 4\" (1.626 m)   Wt 90.7 kg (200 lb)   SpO2 100%   BMI 34.33 kg/m²         Intake/Output Summary (Last 24 hours) at 11/22/2019 2211  Last data filed at 11/22/2019 1655  Gross per 24 hour   Intake    Output 2410 ml   Net -2410 ml        Physical Examination:   General:  Alert, oriented, distress with pain  Resp:  No accessory muscle use, Good sats  Abd:  Soft, IWNSTON drain x1, mild distension and tenderness  Extremities:  No cyanosis or clubbing, no significant edema  Neuro:  Grossly normal, no focal neuro deficits, follows commands   Psych:  Good insight, AAOx3, not agitated.     Data Review:    Review and/or order of clinical lab test  Review and/or order of tests in the radiology section of CPT  Review and/or order of tests in the medicine section of CPT  Labs:     Recent Labs     11/21/19 0445 11/20/19  0540   WBC 6.7 10.8   HGB 9.5* 10.2*   HCT 29.5* 32.3*    172     Recent Labs     11/21/19  0445 11/20/19  0540    140   K 3.9 4.4    107   CO2 31 30   BUN 21* 21*   CREA 0.93 1.01   GLU 82 148*   CA 9.4 9.5     Recent Labs     11/22/19  0210 11/21/19  0445 11/20/19  0540   SGOT 81* 53* 47*   * 96* 100*   * 115 87 TBILI 0.5 0.6 0.7   TP 5.2* 5.5* 5.7*   ALB 2.2* 2.3* 2.3*   GLOB 3.0 3.2 3.4     No results for input(s): INR, PTP, APTT, INREXT, INREXT in the last 72 hours. No results for input(s): FE, TIBC, PSAT, FERR in the last 72 hours. No results found for: FOL, RBCF   No results for input(s): PH, PCO2, PO2 in the last 72 hours. No results for input(s): CPK, CKNDX, TROIQ in the last 72 hours.     No lab exists for component: CPKMB  Lab Results   Component Value Date/Time    Cholesterol, total 191 09/12/2015 09:18 AM    HDL Cholesterol 45 09/12/2015 09:18 AM    LDL,Direct 121 (H) 07/22/2015 04:00 AM    LDL, calculated 109 (H) 09/12/2015 09:18 AM    Triglyceride 186 (H) 09/12/2015 09:18 AM    CHOL/HDL Ratio 5.3 (H) 07/22/2015 04:00 AM     Lab Results   Component Value Date/Time    Glucose (POC) 102 (H) 11/22/2019 04:42 PM    Glucose (POC) 153 (H) 11/22/2019 11:31 AM    Glucose (POC) 90 11/22/2019 07:26 AM    Glucose (POC) 70 11/22/2019 06:35 AM    Glucose (POC) 151 (H) 11/21/2019 09:31 PM     Lab Results   Component Value Date/Time    Color YELLOW/STRAW 11/16/2019 07:03 PM    Appearance CLOUDY (A) 11/16/2019 07:03 PM    Specific gravity 1.012 11/16/2019 07:03 PM    pH (UA) 7.5 11/16/2019 07:03 PM    Protein NEGATIVE  11/16/2019 07:03 PM    Glucose 100 (A) 11/16/2019 07:03 PM    Ketone NEGATIVE  11/16/2019 07:03 PM    Bilirubin NEGATIVE  11/16/2019 07:03 PM    Urobilinogen 1.0 11/16/2019 07:03 PM    Nitrites NEGATIVE  11/16/2019 07:03 PM    Leukocyte Esterase NEGATIVE  11/16/2019 07:03 PM    Epithelial cells FEW 11/16/2019 07:03 PM    Bacteria NEGATIVE  11/16/2019 07:03 PM    WBC 0-4 11/16/2019 07:03 PM    RBC 0-5 11/16/2019 07:03 PM     Medications Reviewed:     Current Facility-Administered Medications   Medication Dose Route Frequency    amoxicillin-clavulanate (AUGMENTIN) 875-125 mg per tablet 1 Tab  1 Tab Oral Q12H    [START ON 11/23/2019] insulin glargine (LANTUS) injection 27 Units  27 Units SubCUTAneous DAILY  guaiFENesin ER (MUCINEX) tablet 600 mg  600 mg Oral Q12H    lactulose (CHRONULAC) 10 gram/15 mL solution 45 mL  30 g Oral DAILY    acetaminophen (TYLENOL) tablet 500 mg  500 mg Oral Q4H PRN    morphine IR (MS IR) tablet 15 mg  15 mg Oral Q6H PRN    traMADol (ULTRAM) tablet 50 mg  50 mg Oral Q6H PRN    senna-docusate (PERICOLACE) 8.6-50 mg per tablet 1 Tab  1 Tab Oral BID    polyethylene glycol (MIRALAX) packet 17 g  17 g Oral DAILY    LORazepam (ATIVAN) injection 0.5 mg  0.5 mg IntraVENous Q6H PRN    sodium chloride (NS) flush 5-40 mL  5-40 mL IntraVENous Q8H    sodium chloride (NS) flush 5-40 mL  5-40 mL IntraVENous PRN    aspirin chewable tablet 81 mg  81 mg Oral DAILY    carvedilol (COREG) tablet 6.25 mg  6.25 mg Oral BID WITH MEALS    DULoxetine (CYMBALTA) capsule 60 mg  60 mg Oral DAILY    ezetimibe (ZETIA) tablet 10 mg  10 mg Oral DAILY    fenofibrate nanocrystallized (TRICOR) tablet 145 mg  145 mg Oral DAILY    lisinopril (PRINIVIL, ZESTRIL) tablet 5 mg  5 mg Oral DAILY    loratadine (CLARITIN) tablet 10 mg  10 mg Oral DAILY PRN    fish oil-omega-3 fatty acids 340-1,000 mg capsule 1 Cap  1 Cap Oral DAILY    pravastatin (PRAVACHOL) tablet 40 mg  40 mg Oral QHS    therapeutic multivitamin (THERAGRAN) tablet 1 Tab  1 Tab Oral DAILY    sodium chloride (NS) flush 5-40 mL  5-40 mL IntraVENous Q8H    sodium chloride (NS) flush 5-40 mL  5-40 mL IntraVENous PRN    ondansetron (ZOFRAN) injection 4 mg  4 mg IntraVENous Q4H PRN    insulin regular (NOVOLIN R, HUMULIN R) injection   SubCUTAneous AC&HS    glucose chewable tablet 16 g  4 Tab Oral PRN    glucagon (GLUCAGEN) injection 1 mg  1 mg IntraMUSCular PRN    dextrose 10% infusion 0-250 mL  0-250 mL IntraVENous PRN    arformoterol (BROVANA) neb solution 15 mcg  15 mcg Nebulization BID RT    And    budesonide (PULMICORT) 500 mcg/2 ml nebulizer suspension  500 mcg Nebulization BID RT    hydrALAZINE (APRESOLINE) 20 mg/mL injection 20 mg  20 mg IntraVENous Q6H PRN   ______________________________________________________________________  EXPECTED LENGTH OF STAY: 2d 12h  ACTUAL LENGTH OF STAY:          6               Nino Ayoub MD

## 2019-11-23 NOTE — PROGRESS NOTES
Hospitalist Progress Note  Dennis Davidson MD  Answering service: [phone] OR [phone] from in house phone      Date of Service:  2019  NAME:  Rogerio Salazar  :  1940  MRN:  [id number]    Admission Summary:   Patient is a 53-year-old female with medical history significant for hypertension, GERD, type 2 diabetes mellitus, COPD, CADs/p PCI and stents and status post EGD in  remarkable for esophagitis who presents to the emergency department with a chief complaint of worsening abdominal pain. Patient reports ,over the past few months she has been having intermittent crampy, abdominal pain more on the epigastric area , however over the past week it gets worse and since 3 days it became constant and was associated with nausea and nonbloody vomiting and loose stool. She denies any fever, chills, shortness of breath, chest pain, palpitation, dark stool or melena, yellowish discoloration of the eyes, urinary or other bowel complaints. 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     In the emergency department, V/S were unremarkable. Lab: CBC and CMP were unremarkable. CT of the abdomen revealed New choledocholithiasis and CBD dilatation. .  Interval history / Subjective:   Patient seen and examined at bedside.    Nausea today, still describes some abdominal pain,   Elevated CO2 on BMP and low mag today     Assessment & Plan:     choledocholithiasis and CBD dilatation  - mild leukocytosis , liver enzymes and lipase WNL  - CT revealed choledocholithiasis and CBD dilation , consistent with acute cholecystitis  ERCP done  with biliary stent placement  Lap cholecystectomy done   -switched from  IV zosyn to PO augmentin    Stable postop course but nausea today     CAD s/p PCI and stents  - Continue home ASA, BB ACE and studies  Hypertension Continue home meds- Hydralazine as needed. DM2: with hyperglycemia- Hold oral meds- home insulin- SSI- Accu-Checks   COPD - elevated CO2 on BMP this am  Cont oxygen at home dose, stable SO2- cont nebs as needed- CXR ordered  HLD- resume home statin med- TLT=698  GERD- cont PPI   Low mag- replete IV and PO    Patient's Baseline: Ambulates with walking  DVT ppx: SCD  Code status: Full  Disposition: TBD  Care plan discussed with patient/Family and nurse. Hospital Problems  Date Reviewed: 11/19/2019          Codes Class Noted POA    Dilation of common bile duct ICD-10-CM: K83.8  ICD-9-CM: 576.8  11/16/2019 Unknown            Review of Systems:   Pertinent items are mentioned in interval history. Vital Signs:    Last 24hrs VS reviewed since prior progress note. Most recent are:  Visit Vitals  /83   Pulse 70   Temp 97.6 °F (36.4 °C)   Resp 18   Ht 5' 4\" (1.626 m)   Wt 90.7 kg (200 lb)   SpO2 100%   BMI 34.33 kg/m²         Intake/Output Summary (Last 24 hours) at 11/23/2019 1036  Last data filed at 11/23/2019 0800  Gross per 24 hour   Intake 240 ml   Output 2100 ml   Net -1860 ml        Physical Examination:   General:  Alert, oriented, distress with pain  Resp:  No accessory muscle use, Good sats  Abd:  Soft, mild distension and tenderness  Extremities:  No cyanosis or clubbing, no significant edema  Neuro:  Grossly normal, no focal neuro deficits, follows commands   Psych:  Good insight, AAOx3, not agitated.     Data Review:    Review and/or order of clinical lab test  Review and/or order of tests in the radiology section of CPT  Review and/or order of tests in the medicine section of CPT  Labs:     Recent Labs     11/23/19  0500 11/21/19  0445   WBC 7.5 6.7   HGB 12.0 9.5*   HCT 36.5 29.5*    153     Recent Labs     11/23/19  0500 11/21/19  0445    139   K 4.4 3.9   CL 99 106   CO2 38* 31   BUN 12 21*   CREA 0.87 0.93   GLU 62* 82   CA 9.8 9.4   MG 1.2*  --      Recent Labs     11/23/19  0500 11/22/19  0210 11/21/19  0445   SGOT 41* 81* 53*   * 130* 96*   * 137* 115   TBILI 0.6 0.5 0.6   TP 6.1* 5.2* 5.5*   ALB 2.5* 2.2* 2.3*   GLOB 3.6 3.0 3.2     No results for input(s): INR, PTP, APTT, INREXT, INREXT in the last 72 hours. No results for input(s): FE, TIBC, PSAT, FERR in the last 72 hours. No results found for: FOL, RBCF   No results for input(s): PH, PCO2, PO2 in the last 72 hours. No results for input(s): CPK, CKNDX, TROIQ in the last 72 hours.     No lab exists for component: CPKMB  Lab Results   Component Value Date/Time    Cholesterol, total 191 09/12/2015 09:18 AM    HDL Cholesterol 45 09/12/2015 09:18 AM    LDL,Direct 121 (H) 07/22/2015 04:00 AM    LDL, calculated 109 (H) 09/12/2015 09:18 AM    Triglyceride 186 (H) 09/12/2015 09:18 AM    CHOL/HDL Ratio 5.3 (H) 07/22/2015 04:00 AM     Lab Results   Component Value Date/Time    Glucose (POC) 97 11/23/2019 06:44 AM    Glucose (POC) 64 (L) 11/23/2019 06:06 AM    Glucose (POC) 99 11/22/2019 10:21 PM    Glucose (POC) 102 (H) 11/22/2019 04:42 PM    Glucose (POC) 153 (H) 11/22/2019 11:31 AM     Lab Results   Component Value Date/Time    Color YELLOW/STRAW 11/16/2019 07:03 PM    Appearance CLOUDY (A) 11/16/2019 07:03 PM    Specific gravity 1.012 11/16/2019 07:03 PM    pH (UA) 7.5 11/16/2019 07:03 PM    Protein NEGATIVE  11/16/2019 07:03 PM    Glucose 100 (A) 11/16/2019 07:03 PM    Ketone NEGATIVE  11/16/2019 07:03 PM    Bilirubin NEGATIVE  11/16/2019 07:03 PM    Urobilinogen 1.0 11/16/2019 07:03 PM    Nitrites NEGATIVE  11/16/2019 07:03 PM    Leukocyte Esterase NEGATIVE  11/16/2019 07:03 PM    Epithelial cells FEW 11/16/2019 07:03 PM    Bacteria NEGATIVE  11/16/2019 07:03 PM    WBC 0-4 11/16/2019 07:03 PM    RBC 0-5 11/16/2019 07:03 PM     Medications Reviewed:     Current Facility-Administered Medications   Medication Dose Route Frequency    magnesium oxide (MAG-OX) tablet 400 mg  400 mg Oral BID    amoxicillin-clavulanate (AUGMENTIN) 875-125 mg per tablet 1 Tab  1 Tab Oral Q12H    insulin glargine (LANTUS) injection 27 Units  27 Units SubCUTAneous DAILY    guaiFENesin ER (MUCINEX) tablet 600 mg  600 mg Oral Q12H    lactulose (CHRONULAC) 10 gram/15 mL solution 45 mL  30 g Oral DAILY    acetaminophen (TYLENOL) tablet 500 mg  500 mg Oral Q4H PRN    morphine IR (MS IR) tablet 15 mg  15 mg Oral Q6H PRN    traMADol (ULTRAM) tablet 50 mg  50 mg Oral Q6H PRN    senna-docusate (PERICOLACE) 8.6-50 mg per tablet 1 Tab  1 Tab Oral BID    polyethylene glycol (MIRALAX) packet 17 g  17 g Oral DAILY    LORazepam (ATIVAN) injection 0.5 mg  0.5 mg IntraVENous Q6H PRN    sodium chloride (NS) flush 5-40 mL  5-40 mL IntraVENous Q8H    sodium chloride (NS) flush 5-40 mL  5-40 mL IntraVENous PRN    aspirin chewable tablet 81 mg  81 mg Oral DAILY    carvedilol (COREG) tablet 6.25 mg  6.25 mg Oral BID WITH MEALS    DULoxetine (CYMBALTA) capsule 60 mg  60 mg Oral DAILY    ezetimibe (ZETIA) tablet 10 mg  10 mg Oral DAILY    fenofibrate nanocrystallized (TRICOR) tablet 145 mg  145 mg Oral DAILY    lisinopril (PRINIVIL, ZESTRIL) tablet 5 mg  5 mg Oral DAILY    loratadine (CLARITIN) tablet 10 mg  10 mg Oral DAILY PRN    fish oil-omega-3 fatty acids 340-1,000 mg capsule 1 Cap  1 Cap Oral DAILY    pravastatin (PRAVACHOL) tablet 40 mg  40 mg Oral QHS    therapeutic multivitamin (THERAGRAN) tablet 1 Tab  1 Tab Oral DAILY    sodium chloride (NS) flush 5-40 mL  5-40 mL IntraVENous Q8H    sodium chloride (NS) flush 5-40 mL  5-40 mL IntraVENous PRN    ondansetron (ZOFRAN) injection 4 mg  4 mg IntraVENous Q4H PRN    insulin regular (NOVOLIN R, HUMULIN R) injection   SubCUTAneous AC&HS    glucose chewable tablet 16 g  4 Tab Oral PRN    glucagon (GLUCAGEN) injection 1 mg  1 mg IntraMUSCular PRN    dextrose 10% infusion 0-250 mL  0-250 mL IntraVENous PRN    arformoterol (BROVANA) neb solution 15 mcg  15 mcg Nebulization BID RT    And    budesonide (PULMICORT) 500 mcg/2 ml nebulizer suspension  500 mcg Nebulization BID RT    hydrALAZINE (APRESOLINE) 20 mg/mL injection 20 mg  20 mg IntraVENous Q6H PRN   ______________________________________________________________________  EXPECTED LENGTH OF STAY: 2d 12h  ACTUAL LENGTH OF STAY:          7               Uche Mojica MD

## 2019-11-23 NOTE — PROGRESS NOTES
Bedside shift change report given to MAXIMILIANO Vasquez (oncoming nurse) by Yashira Ramos RN (offgoing nurse). Report included the following information SBAR, Kardex, Intake/Output and MAR.

## 2019-11-24 LAB
ALBUMIN SERPL-MCNC: 2.3 G/DL (ref 3.5–5)
ALBUMIN/GLOB SERPL: 0.8 {RATIO} (ref 1.1–2.2)
ALP SERPL-CCNC: 116 U/L (ref 45–117)
ALT SERPL-CCNC: 68 U/L (ref 12–78)
ANION GAP SERPL CALC-SCNC: 1 MMOL/L (ref 5–15)
AST SERPL-CCNC: 22 U/L (ref 15–37)
BILIRUB DIRECT SERPL-MCNC: 0.2 MG/DL (ref 0–0.2)
BILIRUB SERPL-MCNC: 0.4 MG/DL (ref 0.2–1)
BUN SERPL-MCNC: 11 MG/DL (ref 6–20)
BUN/CREAT SERPL: 12 (ref 12–20)
CALCIUM SERPL-MCNC: 9.4 MG/DL (ref 8.5–10.1)
CHLORIDE SERPL-SCNC: 100 MMOL/L (ref 97–108)
CO2 SERPL-SCNC: 39 MMOL/L (ref 21–32)
CREAT SERPL-MCNC: 0.92 MG/DL (ref 0.55–1.02)
ERYTHROCYTE [DISTWIDTH] IN BLOOD BY AUTOMATED COUNT: 13.2 % (ref 11.5–14.5)
GLOBULIN SER CALC-MCNC: 3 G/DL (ref 2–4)
GLUCOSE BLD STRIP.AUTO-MCNC: 149 MG/DL (ref 65–100)
GLUCOSE BLD STRIP.AUTO-MCNC: 152 MG/DL (ref 65–100)
GLUCOSE BLD STRIP.AUTO-MCNC: 249 MG/DL (ref 65–100)
GLUCOSE BLD STRIP.AUTO-MCNC: 78 MG/DL (ref 65–100)
GLUCOSE SERPL-MCNC: 96 MG/DL (ref 65–100)
HCT VFR BLD AUTO: 31.9 % (ref 35–47)
HGB BLD-MCNC: 10.3 G/DL (ref 11.5–16)
MAGNESIUM SERPL-MCNC: 1.6 MG/DL (ref 1.6–2.4)
MCH RBC QN AUTO: 29.9 PG (ref 26–34)
MCHC RBC AUTO-ENTMCNC: 32.3 G/DL (ref 30–36.5)
MCV RBC AUTO: 92.7 FL (ref 80–99)
NRBC # BLD: 0 K/UL (ref 0–0.01)
NRBC BLD-RTO: 0 PER 100 WBC
PLATELET # BLD AUTO: 204 K/UL (ref 150–400)
PMV BLD AUTO: 9.7 FL (ref 8.9–12.9)
POTASSIUM SERPL-SCNC: 4.2 MMOL/L (ref 3.5–5.1)
PROT SERPL-MCNC: 5.3 G/DL (ref 6.4–8.2)
RBC # BLD AUTO: 3.44 M/UL (ref 3.8–5.2)
SERVICE CMNT-IMP: ABNORMAL
SERVICE CMNT-IMP: NORMAL
SODIUM SERPL-SCNC: 140 MMOL/L (ref 136–145)
WBC # BLD AUTO: 6.5 K/UL (ref 3.6–11)

## 2019-11-24 PROCEDURE — 77010033678 HC OXYGEN DAILY

## 2019-11-24 PROCEDURE — 74011000250 HC RX REV CODE- 250: Performed by: FAMILY MEDICINE

## 2019-11-24 PROCEDURE — 94760 N-INVAS EAR/PLS OXIMETRY 1: CPT

## 2019-11-24 PROCEDURE — 74011000250 HC RX REV CODE- 250: Performed by: SURGERY

## 2019-11-24 PROCEDURE — 94640 AIRWAY INHALATION TREATMENT: CPT

## 2019-11-24 PROCEDURE — 36415 COLL VENOUS BLD VENIPUNCTURE: CPT

## 2019-11-24 PROCEDURE — 74011250637 HC RX REV CODE- 250/637: Performed by: SURGERY

## 2019-11-24 PROCEDURE — 94664 DEMO&/EVAL PT USE INHALER: CPT

## 2019-11-24 PROCEDURE — 83735 ASSAY OF MAGNESIUM: CPT

## 2019-11-24 PROCEDURE — 74011636637 HC RX REV CODE- 636/637: Performed by: SURGERY

## 2019-11-24 PROCEDURE — 80048 BASIC METABOLIC PNL TOTAL CA: CPT

## 2019-11-24 PROCEDURE — 74011250637 HC RX REV CODE- 250/637: Performed by: FAMILY MEDICINE

## 2019-11-24 PROCEDURE — 65270000032 HC RM SEMIPRIVATE

## 2019-11-24 PROCEDURE — 80076 HEPATIC FUNCTION PANEL: CPT

## 2019-11-24 PROCEDURE — 82962 GLUCOSE BLOOD TEST: CPT

## 2019-11-24 PROCEDURE — 85027 COMPLETE CBC AUTOMATED: CPT

## 2019-11-24 RX ORDER — IPRATROPIUM BROMIDE AND ALBUTEROL SULFATE 2.5; .5 MG/3ML; MG/3ML
3 SOLUTION RESPIRATORY (INHALATION)
Status: DISCONTINUED | OUTPATIENT
Start: 2019-11-24 | End: 2019-11-25 | Stop reason: HOSPADM

## 2019-11-24 RX ORDER — IPRATROPIUM BROMIDE AND ALBUTEROL SULFATE 2.5; .5 MG/3ML; MG/3ML
3 SOLUTION RESPIRATORY (INHALATION)
Status: DISCONTINUED | OUTPATIENT
Start: 2019-11-25 | End: 2019-11-25 | Stop reason: HOSPADM

## 2019-11-24 RX ORDER — IPRATROPIUM BROMIDE AND ALBUTEROL SULFATE 2.5; .5 MG/3ML; MG/3ML
3 SOLUTION RESPIRATORY (INHALATION)
Status: DISCONTINUED | OUTPATIENT
Start: 2019-11-24 | End: 2019-11-24

## 2019-11-24 RX ADMIN — BUDESONIDE 500 MCG: 0.5 INHALANT RESPIRATORY (INHALATION) at 08:47

## 2019-11-24 RX ADMIN — HUMAN INSULIN 5 UNITS: 100 INJECTION, SOLUTION SUBCUTANEOUS at 12:08

## 2019-11-24 RX ADMIN — IPRATROPIUM BROMIDE AND ALBUTEROL SULFATE 3 ML: .5; 3 SOLUTION RESPIRATORY (INHALATION) at 12:42

## 2019-11-24 RX ADMIN — GUAIFENESIN 600 MG: 600 TABLET, EXTENDED RELEASE ORAL at 21:44

## 2019-11-24 RX ADMIN — IPRATROPIUM BROMIDE AND ALBUTEROL SULFATE 3 ML: .5; 3 SOLUTION RESPIRATORY (INHALATION) at 15:16

## 2019-11-24 RX ADMIN — HUMAN INSULIN 2 UNITS: 100 INJECTION, SOLUTION SUBCUTANEOUS at 17:17

## 2019-11-24 RX ADMIN — BUDESONIDE 500 MCG: 0.5 INHALANT RESPIRATORY (INHALATION) at 20:59

## 2019-11-24 RX ADMIN — MAGNESIUM OXIDE TAB 400 MG (241.3 MG ELEMENTAL MG) 400 MG: 400 (241.3 MG) TAB at 17:18

## 2019-11-24 RX ADMIN — Medication 1 CAPSULE: at 09:48

## 2019-11-24 RX ADMIN — EZETIMIBE 10 MG: 10 TABLET ORAL at 09:00

## 2019-11-24 RX ADMIN — THERA TABS 1 TABLET: TAB at 09:48

## 2019-11-24 RX ADMIN — MORPHINE SULFATE 15 MG: 15 TABLET ORAL at 07:15

## 2019-11-24 RX ADMIN — PRAVASTATIN SODIUM 40 MG: 40 TABLET ORAL at 21:44

## 2019-11-24 RX ADMIN — IPRATROPIUM BROMIDE AND ALBUTEROL SULFATE 3 ML: .5; 3 SOLUTION RESPIRATORY (INHALATION) at 20:59

## 2019-11-24 RX ADMIN — AMOXICILLIN AND CLAVULANATE POTASSIUM 1 TABLET: 875; 125 TABLET, FILM COATED ORAL at 09:48

## 2019-11-24 RX ADMIN — MORPHINE SULFATE 15 MG: 15 TABLET ORAL at 17:25

## 2019-11-24 RX ADMIN — SODIUM CHLORIDE 10 ML: 9 INJECTION INTRAMUSCULAR; INTRAVENOUS; SUBCUTANEOUS at 21:44

## 2019-11-24 RX ADMIN — ASPIRIN 81 MG CHEWABLE TABLET 81 MG: 81 TABLET CHEWABLE at 09:48

## 2019-11-24 RX ADMIN — MAGNESIUM OXIDE TAB 400 MG (241.3 MG ELEMENTAL MG) 400 MG: 400 (241.3 MG) TAB at 09:48

## 2019-11-24 RX ADMIN — FENOFIBRATE 145 MG: 145 TABLET ORAL at 09:48

## 2019-11-24 RX ADMIN — CARVEDILOL 6.25 MG: 6.25 TABLET, FILM COATED ORAL at 07:09

## 2019-11-24 RX ADMIN — SODIUM CHLORIDE 10 ML: 9 INJECTION INTRAMUSCULAR; INTRAVENOUS; SUBCUTANEOUS at 07:09

## 2019-11-24 RX ADMIN — CARVEDILOL 6.25 MG: 6.25 TABLET, FILM COATED ORAL at 17:18

## 2019-11-24 RX ADMIN — LISINOPRIL 5 MG: 5 TABLET ORAL at 09:48

## 2019-11-24 RX ADMIN — AMOXICILLIN AND CLAVULANATE POTASSIUM 1 TABLET: 875; 125 TABLET, FILM COATED ORAL at 21:44

## 2019-11-24 RX ADMIN — IPRATROPIUM BROMIDE AND ALBUTEROL SULFATE 3 ML: .5; 3 SOLUTION RESPIRATORY (INHALATION) at 08:47

## 2019-11-24 RX ADMIN — GUAIFENESIN 600 MG: 600 TABLET, EXTENDED RELEASE ORAL at 09:48

## 2019-11-24 RX ADMIN — STANDARDIZED SENNA CONCENTRATE AND DOCUSATE SODIUM 1 TABLET: 8.6; 5 TABLET ORAL at 17:18

## 2019-11-24 RX ADMIN — DULOXETINE HYDROCHLORIDE 60 MG: 60 CAPSULE, DELAYED RELEASE ORAL at 09:48

## 2019-11-24 NOTE — PROGRESS NOTES
Hospitalist Progress Note  Inderjit Bettencourt MD  Answering service: [phone] OR [phone] from in house phone      Date of Service:  2019  NAME:  Lashawn Min  :  1940  MRN:  [id number]    Admission Summary:   Patient is a 61-year-old female with medical history significant for hypertension, GERD, type 2 diabetes mellitus, COPD, CADs/p PCI and stents and status post EGD in  remarkable for esophagitis who presents to the emergency department with a chief complaint of worsening abdominal pain. Patient reports ,over the past few months she has been having intermittent crampy, abdominal pain more on the epigastric area , however over the past week it gets worse and since 3 days it became constant and was associated with nausea and nonbloody vomiting and loose stool. She denies any fever, chills, shortness of breath, chest pain, palpitation, dark stool or melena, yellowish discoloration of the eyes, urinary or other bowel complaints. 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     In the emergency department, V/S were unremarkable. Lab: CBC and CMP were unremarkable. CT of the abdomen revealed New choledocholithiasis and CBD dilatation. .  Interval history / Subjective:   Patient seen and examined at bedside.    Nausea today, still describes some abdominal pain,   Elevated CO2 on BMP and low mag today     Assessment & Plan:     choledocholithiasis and CBD dilatation  - mild leukocytosis , liver enzymes and lipase WNL  - CT revealed choledocholithiasis and CBD dilation , consistent with acute cholecystitis  ERCP done  with biliary stent placement  Lap cholecystectomy done   -switched from  IV zosyn to PO augmentin    Stable postop course - not much nausea today     CAD s/p PCI and stents  - Continue home ASA, BB ACE and studies  Hypertension Continue home meds- Hydralazine as needed. DM2: with hyperglycemia- Hold oral meds- home insulin- SSI- Accu-Checks   COPD - elevated CO2 on BMP this am- resume scheduled nebs q4hrs  Cont oxygen at home dose, stable SO2- cont nebs as needed- CXR ordered  HLD- resume home statin med- XYG=097  GERD- cont PPI   Low mag- replete IV and PO    Patient's Baseline: Ambulates with walking  DVT ppx: SCD  Code status: Full  Disposition: TBD  Care plan discussed with patient/Family and nurse. Hospital Problems  Date Reviewed: 11/19/2019          Codes Class Noted POA    Dilation of common bile duct ICD-10-CM: K83.8  ICD-9-CM: 576.8  11/16/2019 Unknown            Review of Systems:   Pertinent items are mentioned in interval history. Vital Signs:    Last 24hrs VS reviewed since prior progress note. Most recent are:  Visit Vitals  /62   Pulse 78   Temp 98.7 °F (37.1 °C)   Resp 18   Ht 5' 4\" (1.626 m)   Wt 90.7 kg (200 lb)   SpO2 98%   BMI 34.33 kg/m²         Intake/Output Summary (Last 24 hours) at 11/24/2019 0955  Last data filed at 11/23/2019 1755  Gross per 24 hour   Intake 600 ml   Output 1100 ml   Net -500 ml        Physical Examination:   General:  Alert, oriented, distress with pain  Resp:  No accessory muscle use, Good sats  Abd:  Soft, mild distension and tenderness  Extremities:  No cyanosis or clubbing, no significant edema  Neuro:  Grossly normal, no focal neuro deficits, follows commands   Psych:  Good insight, AAOx3, not agitated.     Data Review:    Review and/or order of clinical lab test  Review and/or order of tests in the radiology section of CPT  Review and/or order of tests in the medicine section of CPT  Labs:     Recent Labs     11/24/19  0200 11/23/19  0500   WBC 6.5 7.5   HGB 10.3* 12.0   HCT 31.9* 36.5    212     Recent Labs     11/24/19  0200 11/23/19  0500    140   K 4.2 4.4    99   CO2 39* 38*   BUN 11 12   CREA 0.92 0.87   GLU 96 62*   CA 9.4 9.8 MG 1.6 1.2*     Recent Labs     11/24/19  0200 11/23/19  0500 11/22/19  0210   SGOT 22 41* 81*   ALT 68 107* 130*    135* 137*   TBILI 0.4 0.6 0.5   TP 5.3* 6.1* 5.2*   ALB 2.3* 2.5* 2.2*   GLOB 3.0 3.6 3.0     No results for input(s): INR, PTP, APTT, INREXT, INREXT in the last 72 hours. No results for input(s): FE, TIBC, PSAT, FERR in the last 72 hours. No results found for: FOL, RBCF   No results for input(s): PH, PCO2, PO2 in the last 72 hours. No results for input(s): CPK, CKNDX, TROIQ in the last 72 hours.     No lab exists for component: CPKMB  Lab Results   Component Value Date/Time    Cholesterol, total 191 09/12/2015 09:18 AM    HDL Cholesterol 45 09/12/2015 09:18 AM    LDL,Direct 121 (H) 07/22/2015 04:00 AM    LDL, calculated 109 (H) 09/12/2015 09:18 AM    Triglyceride 186 (H) 09/12/2015 09:18 AM    CHOL/HDL Ratio 5.3 (H) 07/22/2015 04:00 AM     Lab Results   Component Value Date/Time    Glucose (POC) 78 11/24/2019 06:27 AM    Glucose (POC) 186 (H) 11/23/2019 08:55 PM    Glucose (POC) 179 (H) 11/23/2019 04:39 PM    Glucose (POC) 172 (H) 11/23/2019 10:48 AM    Glucose (POC) 97 11/23/2019 06:44 AM     Lab Results   Component Value Date/Time    Color YELLOW/STRAW 11/16/2019 07:03 PM    Appearance CLOUDY (A) 11/16/2019 07:03 PM    Specific gravity 1.012 11/16/2019 07:03 PM    pH (UA) 7.5 11/16/2019 07:03 PM    Protein NEGATIVE  11/16/2019 07:03 PM    Glucose 100 (A) 11/16/2019 07:03 PM    Ketone NEGATIVE  11/16/2019 07:03 PM    Bilirubin NEGATIVE  11/16/2019 07:03 PM    Urobilinogen 1.0 11/16/2019 07:03 PM    Nitrites NEGATIVE  11/16/2019 07:03 PM    Leukocyte Esterase NEGATIVE  11/16/2019 07:03 PM    Epithelial cells FEW 11/16/2019 07:03 PM    Bacteria NEGATIVE  11/16/2019 07:03 PM    WBC 0-4 11/16/2019 07:03 PM    RBC 0-5 11/16/2019 07:03 PM     Medications Reviewed:     Current Facility-Administered Medications   Medication Dose Route Frequency    albuterol-ipratropium (DUO-NEB) 2.5 MG-0.5 MG/3 ML  3 mL Nebulization Q4H RT    magnesium oxide (MAG-OX) tablet 400 mg  400 mg Oral BID    amoxicillin-clavulanate (AUGMENTIN) 875-125 mg per tablet 1 Tab  1 Tab Oral Q12H    insulin glargine (LANTUS) injection 27 Units  27 Units SubCUTAneous DAILY    guaiFENesin ER (MUCINEX) tablet 600 mg  600 mg Oral Q12H    lactulose (CHRONULAC) 10 gram/15 mL solution 45 mL  30 g Oral DAILY    acetaminophen (TYLENOL) tablet 500 mg  500 mg Oral Q4H PRN    morphine IR (MS IR) tablet 15 mg  15 mg Oral Q6H PRN    traMADol (ULTRAM) tablet 50 mg  50 mg Oral Q6H PRN    senna-docusate (PERICOLACE) 8.6-50 mg per tablet 1 Tab  1 Tab Oral BID    polyethylene glycol (MIRALAX) packet 17 g  17 g Oral DAILY    LORazepam (ATIVAN) injection 0.5 mg  0.5 mg IntraVENous Q6H PRN    sodium chloride (NS) flush 5-40 mL  5-40 mL IntraVENous Q8H    sodium chloride (NS) flush 5-40 mL  5-40 mL IntraVENous PRN    aspirin chewable tablet 81 mg  81 mg Oral DAILY    carvedilol (COREG) tablet 6.25 mg  6.25 mg Oral BID WITH MEALS    DULoxetine (CYMBALTA) capsule 60 mg  60 mg Oral DAILY    ezetimibe (ZETIA) tablet 10 mg  10 mg Oral DAILY    fenofibrate nanocrystallized (TRICOR) tablet 145 mg  145 mg Oral DAILY    lisinopril (PRINIVIL, ZESTRIL) tablet 5 mg  5 mg Oral DAILY    loratadine (CLARITIN) tablet 10 mg  10 mg Oral DAILY PRN    fish oil-omega-3 fatty acids 340-1,000 mg capsule 1 Cap  1 Cap Oral DAILY    pravastatin (PRAVACHOL) tablet 40 mg  40 mg Oral QHS    therapeutic multivitamin (THERAGRAN) tablet 1 Tab  1 Tab Oral DAILY    sodium chloride (NS) flush 5-40 mL  5-40 mL IntraVENous Q8H    sodium chloride (NS) flush 5-40 mL  5-40 mL IntraVENous PRN    ondansetron (ZOFRAN) injection 4 mg  4 mg IntraVENous Q4H PRN    insulin regular (NOVOLIN R, HUMULIN R) injection   SubCUTAneous AC&HS    glucose chewable tablet 16 g  4 Tab Oral PRN    glucagon (GLUCAGEN) injection 1 mg  1 mg IntraMUSCular PRN    dextrose 10% infusion 0-250 mL 0-250 mL IntraVENous PRN    arformoterol (BROVANA) neb solution 15 mcg  15 mcg Nebulization BID RT    And    budesonide (PULMICORT) 500 mcg/2 ml nebulizer suspension  500 mcg Nebulization BID RT    hydrALAZINE (APRESOLINE) 20 mg/mL injection 20 mg  20 mg IntraVENous Q6H PRN   ______________________________________________________________________  EXPECTED LENGTH OF STAY: 2d 12h  ACTUAL LENGTH OF STAY:          8               Li Herrera MD

## 2019-11-24 NOTE — ROUTINE PROCESS
Bedside and Verbal shift change report given to Keyur Mascorro (oncoming nurse) by Donte Bhakta RN (offgoing nurse). Report included the following information SBAR, Kardex, Intake/Output, MAR and Recent Results.

## 2019-11-25 VITALS
SYSTOLIC BLOOD PRESSURE: 121 MMHG | HEIGHT: 64 IN | OXYGEN SATURATION: 99 % | WEIGHT: 200 LBS | DIASTOLIC BLOOD PRESSURE: 73 MMHG | TEMPERATURE: 98.6 F | BODY MASS INDEX: 34.15 KG/M2 | HEART RATE: 82 BPM | RESPIRATION RATE: 18 BRPM

## 2019-11-25 LAB
ALBUMIN SERPL-MCNC: 2.2 G/DL (ref 3.5–5)
ALBUMIN/GLOB SERPL: 0.8 {RATIO} (ref 1.1–2.2)
ALP SERPL-CCNC: 94 U/L (ref 45–117)
ALT SERPL-CCNC: 45 U/L (ref 12–78)
AST SERPL-CCNC: 14 U/L (ref 15–37)
BILIRUB DIRECT SERPL-MCNC: 0.2 MG/DL (ref 0–0.2)
BILIRUB SERPL-MCNC: 0.4 MG/DL (ref 0.2–1)
GLOBULIN SER CALC-MCNC: 2.9 G/DL (ref 2–4)
GLUCOSE BLD STRIP.AUTO-MCNC: 107 MG/DL (ref 65–100)
GLUCOSE BLD STRIP.AUTO-MCNC: 205 MG/DL (ref 65–100)
PROT SERPL-MCNC: 5.1 G/DL (ref 6.4–8.2)
SERVICE CMNT-IMP: ABNORMAL
SERVICE CMNT-IMP: ABNORMAL
TSH SERPL DL<=0.05 MIU/L-ACNC: 5.02 UIU/ML (ref 0.36–3.74)

## 2019-11-25 PROCEDURE — 74011250637 HC RX REV CODE- 250/637: Performed by: SURGERY

## 2019-11-25 PROCEDURE — 97530 THERAPEUTIC ACTIVITIES: CPT

## 2019-11-25 PROCEDURE — 82962 GLUCOSE BLOOD TEST: CPT

## 2019-11-25 PROCEDURE — 36415 COLL VENOUS BLD VENIPUNCTURE: CPT

## 2019-11-25 PROCEDURE — 77010033678 HC OXYGEN DAILY

## 2019-11-25 PROCEDURE — 74011636637 HC RX REV CODE- 636/637: Performed by: FAMILY MEDICINE

## 2019-11-25 PROCEDURE — 80076 HEPATIC FUNCTION PANEL: CPT

## 2019-11-25 PROCEDURE — 94760 N-INVAS EAR/PLS OXIMETRY 1: CPT

## 2019-11-25 PROCEDURE — 97535 SELF CARE MNGMENT TRAINING: CPT

## 2019-11-25 PROCEDURE — 74011000250 HC RX REV CODE- 250: Performed by: SURGERY

## 2019-11-25 PROCEDURE — 74011000250 HC RX REV CODE- 250: Performed by: FAMILY MEDICINE

## 2019-11-25 PROCEDURE — 94640 AIRWAY INHALATION TREATMENT: CPT

## 2019-11-25 PROCEDURE — 74011636637 HC RX REV CODE- 636/637: Performed by: SURGERY

## 2019-11-25 PROCEDURE — 84443 ASSAY THYROID STIM HORMONE: CPT

## 2019-11-25 PROCEDURE — 97116 GAIT TRAINING THERAPY: CPT

## 2019-11-25 PROCEDURE — 74011250637 HC RX REV CODE- 250/637: Performed by: FAMILY MEDICINE

## 2019-11-25 RX ORDER — MORPHINE SULFATE 15 MG/1
7.5-15 TABLET ORAL
Qty: 20 TAB | Refills: 0 | Status: SHIPPED | OUTPATIENT
Start: 2019-11-25 | End: 2019-11-28

## 2019-11-25 RX ORDER — OMEPRAZOLE 20 MG/1
20 CAPSULE, DELAYED RELEASE ORAL
Qty: 90 CAP | Refills: 0 | Status: SHIPPED | OUTPATIENT
Start: 2019-11-25

## 2019-11-25 RX ORDER — ACETAMINOPHEN 500 MG
500 TABLET ORAL
Qty: 30 TAB | Refills: 0 | Status: SHIPPED
Start: 2019-11-25

## 2019-11-25 RX ORDER — ALBUTEROL SULFATE 0.83 MG/ML
2.5 SOLUTION RESPIRATORY (INHALATION) 4 TIMES DAILY
Qty: 1 NEBULE | Refills: 0 | Status: ON HOLD
Start: 2019-11-25 | End: 2021-04-26 | Stop reason: SDUPTHER

## 2019-11-25 RX ORDER — METFORMIN HYDROCHLORIDE 500 MG/1
1000 TABLET, FILM COATED, EXTENDED RELEASE ORAL 2 TIMES DAILY
Qty: 60 TAB | Refills: 0 | Status: SHIPPED | OUTPATIENT
Start: 2019-11-25 | End: 2020-05-08 | Stop reason: CLARIF

## 2019-11-25 RX ORDER — AMOXICILLIN AND CLAVULANATE POTASSIUM 875; 125 MG/1; MG/1
1 TABLET, FILM COATED ORAL EVERY 12 HOURS
Qty: 8 TAB | Refills: 0 | Status: SHIPPED | OUTPATIENT
Start: 2019-11-25 | End: 2020-05-08 | Stop reason: CLARIF

## 2019-11-25 RX ORDER — FENOFIBRATE 160 MG/1
160 TABLET ORAL DAILY
Qty: 30 TAB | Refills: 0 | Status: SHIPPED
Start: 2019-11-25

## 2019-11-25 RX ORDER — INSULIN GLARGINE 100 [IU]/ML
25 INJECTION, SOLUTION SUBCUTANEOUS DAILY
Qty: 1 PEN | Refills: 0 | Status: SHIPPED | OUTPATIENT
Start: 2019-11-25

## 2019-11-25 RX ORDER — LANOLIN ALCOHOL/MO/W.PET/CERES
400 CREAM (GRAM) TOPICAL 2 TIMES DAILY
Qty: 60 TAB | Refills: 1 | Status: SHIPPED | OUTPATIENT
Start: 2019-11-25

## 2019-11-25 RX ORDER — PRAVASTATIN SODIUM 40 MG/1
40 TABLET ORAL
Qty: 30 TAB | Refills: 0 | Status: SHIPPED
Start: 2019-11-25

## 2019-11-25 RX ORDER — POLYETHYLENE GLYCOL 3350 17 G/17G
17 POWDER, FOR SOLUTION ORAL DAILY
Qty: 30 PACKET | Refills: 0 | Status: SHIPPED | OUTPATIENT
Start: 2019-11-26 | End: 2019-12-26

## 2019-11-25 RX ORDER — AZITHROMYCIN 250 MG/1
250 TABLET, FILM COATED ORAL
Qty: 6 TAB | Refills: 0 | Status: SHIPPED
Start: 2019-11-25 | End: 2021-04-26

## 2019-11-25 RX ORDER — RANITIDINE 150 MG/1
150 CAPSULE ORAL
Qty: 30 CAP | Refills: 0 | Status: SHIPPED
Start: 2019-11-25 | End: 2020-05-08 | Stop reason: CLARIF

## 2019-11-25 RX ORDER — SUCRALFATE 1 G/1
1 TABLET ORAL 4 TIMES DAILY
Qty: 120 TAB | Refills: 0 | Status: SHIPPED
Start: 2019-11-25 | End: 2019-12-26

## 2019-11-25 RX ORDER — AMOXICILLIN 250 MG
2 CAPSULE ORAL 2 TIMES DAILY
Qty: 60 TAB | Refills: 0 | Status: SHIPPED | OUTPATIENT
Start: 2019-11-25

## 2019-11-25 RX ADMIN — Medication 1 CAPSULE: at 10:14

## 2019-11-25 RX ADMIN — DULOXETINE HYDROCHLORIDE 60 MG: 60 CAPSULE, DELAYED RELEASE ORAL at 10:15

## 2019-11-25 RX ADMIN — LACTULOSE 45 ML: 20 SOLUTION ORAL at 10:13

## 2019-11-25 RX ADMIN — STANDARDIZED SENNA CONCENTRATE AND DOCUSATE SODIUM 1 TABLET: 8.6; 5 TABLET ORAL at 10:15

## 2019-11-25 RX ADMIN — ACETAMINOPHEN 500 MG: 500 TABLET ORAL at 00:18

## 2019-11-25 RX ADMIN — HUMAN INSULIN 3 UNITS: 100 INJECTION, SOLUTION SUBCUTANEOUS at 11:55

## 2019-11-25 RX ADMIN — MAGNESIUM OXIDE TAB 400 MG (241.3 MG ELEMENTAL MG) 400 MG: 400 (241.3 MG) TAB at 10:15

## 2019-11-25 RX ADMIN — LISINOPRIL 5 MG: 5 TABLET ORAL at 10:14

## 2019-11-25 RX ADMIN — ASPIRIN 81 MG CHEWABLE TABLET 81 MG: 81 TABLET CHEWABLE at 10:16

## 2019-11-25 RX ADMIN — CARVEDILOL 6.25 MG: 6.25 TABLET, FILM COATED ORAL at 07:17

## 2019-11-25 RX ADMIN — BUDESONIDE 500 MCG: 0.5 INHALANT RESPIRATORY (INHALATION) at 08:15

## 2019-11-25 RX ADMIN — IPRATROPIUM BROMIDE AND ALBUTEROL SULFATE 3 ML: .5; 3 SOLUTION RESPIRATORY (INHALATION) at 12:38

## 2019-11-25 RX ADMIN — AMOXICILLIN AND CLAVULANATE POTASSIUM 1 TABLET: 875; 125 TABLET, FILM COATED ORAL at 10:15

## 2019-11-25 RX ADMIN — GUAIFENESIN 600 MG: 600 TABLET, EXTENDED RELEASE ORAL at 10:15

## 2019-11-25 RX ADMIN — IPRATROPIUM BROMIDE AND ALBUTEROL SULFATE 3 ML: .5; 3 SOLUTION RESPIRATORY (INHALATION) at 08:15

## 2019-11-25 RX ADMIN — THERA TABS 1 TABLET: TAB at 10:15

## 2019-11-25 RX ADMIN — POLYETHYLENE GLYCOL 3350 17 G: 17 POWDER, FOR SOLUTION ORAL at 10:14

## 2019-11-25 RX ADMIN — EZETIMIBE 10 MG: 10 TABLET ORAL at 10:15

## 2019-11-25 RX ADMIN — INSULIN GLARGINE 27 UNITS: 100 INJECTION, SOLUTION SUBCUTANEOUS at 10:21

## 2019-11-25 RX ADMIN — FENOFIBRATE 145 MG: 145 TABLET ORAL at 10:15

## 2019-11-25 NOTE — PROGRESS NOTES
Problem: Self Care Deficits Care Plan (Adult)  Goal: *Acute Goals and Plan of Care (Insert Text)  Description    FUNCTIONAL STATUS PRIOR TO ADMISSION: Patient was modified independent using a rollator for functional mobility. HOME SUPPORT: The patient lived alone with family to provide assistance. Occupational Therapy Goals  Initiated 11/20/2019  1. Patient will perform grooming standing at sink with modified independence within 7 day(s). 2.  Patient will perform lower body dressing with modified independence within 7 day(s). 3.  Patient will perform bathing with modified independence within 7 day(s). 4.  Patient will perform toilet transfers with modified independence within 7 day(s). 5.  Patient will perform all aspects of toileting with modified independence within 7 day(s). 6.  Patient will participate in upper extremity therapeutic exercise/activities with independence for 10 minutes within 7 day(s). 7.  Patient will utilize energy conservation techniques during functional activities with verbal cues within 7 day(s). Outcome: Progressing Towards Goal    OCCUPATIONAL THERAPY TREATMENT  Patient: Melida العلي (12 y.o. female)  Date: 11/25/2019  Diagnosis: Dilation of common bile duct [K83.8]   <principal problem not specified>  Procedure(s) (LRB):  CHOLECYSTECTOMY LAPAROSCOPIC (N/A) 6 Days Post-Op  Precautions: Fall  Chart, occupational therapy assessment, plan of care, and goals were reviewed. ASSESSMENT  Patient continues with skilled OT services and is progressing towards goals. Patient received in bed and agreeable to EOB treatment, citing having been up in chair all morning and just returning to bed. Patient reports she has been using the [facility] with supervision. Patient able to complete grooming sitting EOB IND after setup. Patient able to reach to feet to reinier/doff socks on EOB. Patient returned to supine at end of session with call bell in reach, RN aware and all needs met.  Will continue to follow. Current Level of Function Impacting Discharge (ADLs): up min A for ADLs    Other factors to consider for discharge: was IND/mod I at baseline         PLAN :  Patient continues to benefit from skilled intervention to address the above impairments. Continue treatment per established plan of care. to address goals. Recommend with staff: Recommend with nursing patient to complete as able in order to maintain strength, endurance and independence: ADLs with supervision/setup, OOB to chair 3x/day and mobilizing to the bathroom for toileting with 1 assist. Thank you for your assistance. Recommend next OT session: full ADL routine    Recommendation for discharge: (in order for the patient to meet his/her long term goals)  Therapy up to 5 days/week in SNF setting    This discharge recommendation:  Has been made in collaboration with the attending provider and/or case management    IF patient discharges home will need the following DME: to be determined (TBD)       SUBJECTIVE:   Patient stated I have been doing just about everything on my own since really soon after the surgery, it's been great.     OBJECTIVE DATA SUMMARY:   Cognitive/Behavioral Status:  Neurologic State: Alert  Orientation Level: Oriented X4  Cognition: Appropriate for age attention/concentration; Follows commands  Perception: Appears intact  Perseveration: No perseveration noted  Safety/Judgement: Awareness of environment    Functional Mobility and Transfers for ADLs:  Bed Mobility:  Supine to Sit: Supervision  Sit to Supine: Supervision  Scooting: Supervision    Transfers:  Sit to Stand: Contact guard assistance    Balance:  Sitting: Intact  Standing: Intact; With support  Standing - Static: Constant support;Good  Standing - Dynamic : Fair    ADL Intervention:    Grooming  Washing Face: Set-up  Brushing Teeth: Set-up    Lower Body Dressing Assistance  Socks: Set-up  Leg Crossed Method Used: No  Position Performed: Seated edge of bed         Cognitive Retraining  Safety/Judgement: Awareness of environment      Activity Tolerance:   Good  Please refer to the flowsheet for vital signs taken during this treatment.     After treatment patient left in no apparent distress:   Supine in bed, Call bell within reach, and RN aware     COMMUNICATION/COLLABORATION:   The patients plan of care was discussed with: Physical Therapist and Registered Nurse    Monae Chatterjee OT  Time Calculation: 13 mins

## 2019-11-25 NOTE — PROGRESS NOTES
[facility]  Daily Progress Note    Admit Date: 2019  Post-Operative Day: 6 from Procedure(s):  CHOLECYSTECTOMY LAPAROSCOPIC     Subjective:     Last 24 hrs: Pain well controlled. Tolerating PO, + flatus. No BM yet, taking bowel regimen. Getting OOB with PT.        Objective:     Blood pressure 125/66, pulse 74, temperature 97.8 °F (36.6 °C), resp. rate 18, height 5' 4\" (1.626 m), weight 90.7 kg (200 lb), SpO2 98 %. Temp (24hrs), Av °F (36.7 °C), Min:97.7 °F (36.5 °C), Max:98.6 °F (37 °C)      _____________________  Physical Exam:     Alert and Oriented, lying in bed, no acute distress. Cardiovascular: RRR  Abdomen: soft, NT. Incisions healing well. Assessment:   Active Problems:    Dilation of common bile duct (2019)      S/p laparoscopic cholecystectomy      Plan:     Doing well. From surgical standpoint, can DC when ready  F/U in office        Kemar Madison, [address] [facility] [place] [address],  [facility], [address] [place] [address]  [phone]    Data Review:    Recent Labs     19  0200 19  0500   WBC 6.5 7.5   HGB 10.3* 12.0   HCT 31.9* 36.5    212     Recent Labs     19  0500 19  0200 19  0500   NA  --  140 140   K  --  4.2 4.4   CL  --  100 99   CO2  --  39* 38*   GLU  --  96 62*   BUN  --  11 12   CREA  --  0.92 0.87   CA  --  9.4 9.8   MG  --  1.6 1.2*   ALB 2.2* 2.3* 2.5*   TBILI 0.4 0.4 0.6   SGOT 14* 22 41*   ALT 45 68 107*     No results for input(s): AML, LPSE in the last 72 hours.         ______________________  Medications:    Current Facility-Administered Medications   Medication Dose Route Frequency    albuterol-ipratropium (DUO-NEB) 2.5 MG-0.5 MG/3 ML  3 mL Nebulization QID RT    albuterol-ipratropium (DUO-NEB) 2.5 MG-0.5 MG/3 ML  3 mL Nebulization Q4H PRN    magnesium oxide (MAG-OX) tablet 400 mg  400 mg Oral BID    amoxicillin-clavulanate (AUGMENTIN) 875-125 mg per tablet 1 Tab  1 Tab Oral Q12H    insulin glargine (LANTUS) injection 27 Units  27 Units SubCUTAneous DAILY    guaiFENesin ER (MUCINEX) tablet 600 mg  600 mg Oral Q12H    lactulose (CHRONULAC) 10 gram/15 mL solution 45 mL  30 g Oral DAILY    acetaminophen (TYLENOL) tablet 500 mg  500 mg Oral Q4H PRN    morphine IR (MS IR) tablet 15 mg  15 mg Oral Q6H PRN    traMADol (ULTRAM) tablet 50 mg  50 mg Oral Q6H PRN    senna-docusate (PERICOLACE) 8.6-50 mg per tablet 1 Tab  1 Tab Oral BID    polyethylene glycol (MIRALAX) packet 17 g  17 g Oral DAILY    LORazepam (ATIVAN) injection 0.5 mg  0.5 mg IntraVENous Q6H PRN    sodium chloride (NS) flush 5-40 mL  5-40 mL IntraVENous Q8H    sodium chloride (NS) flush 5-40 mL  5-40 mL IntraVENous PRN    aspirin chewable tablet 81 mg  81 mg Oral DAILY    carvedilol (COREG) tablet 6.25 mg  6.25 mg Oral BID WITH MEALS    DULoxetine (CYMBALTA) capsule 60 mg  60 mg Oral DAILY    ezetimibe (ZETIA) tablet 10 mg  10 mg Oral DAILY    fenofibrate nanocrystallized (TRICOR) tablet 145 mg  145 mg Oral DAILY    lisinopril (PRINIVIL, ZESTRIL) tablet 5 mg  5 mg Oral DAILY    loratadine (CLARITIN) tablet 10 mg  10 mg Oral DAILY PRN    fish oil-omega-3 fatty acids 340-1,000 mg capsule 1 Cap  1 Cap Oral DAILY    pravastatin (PRAVACHOL) tablet 40 mg  40 mg Oral QHS    therapeutic multivitamin (THERAGRAN) tablet 1 Tab  1 Tab Oral DAILY    sodium chloride (NS) flush 5-40 mL  5-40 mL IntraVENous Q8H    sodium chloride (NS) flush 5-40 mL  5-40 mL IntraVENous PRN    ondansetron (ZOFRAN) injection 4 mg  4 mg IntraVENous Q4H PRN    insulin regular (NOVOLIN R, HUMULIN R) injection   SubCUTAneous AC&HS    glucose chewable tablet 16 g  4 Tab Oral PRN    glucagon (GLUCAGEN) injection 1 mg  1 mg IntraMUSCular PRN    dextrose 10% infusion 0-250 mL  0-250 mL IntraVENous PRN    arformoterol (BROVANA) neb solution 15 mcg  15 mcg Nebulization BID RT    And    budesonide (PULMICORT) 500 mcg/2 ml nebulizer suspension  500 mcg Nebulization BID RT    hydrALAZINE (APRESOLINE) 20 mg/mL injection 20 mg  20 mg IntraVENous Q6H PRN

## 2019-11-25 NOTE — PROGRESS NOTES
Problem: Mobility Impaired (Adult and Pediatric)  Goal: *Acute Goals and Plan of Care (Insert Text)  Description  FUNCTIONAL STATUS PRIOR TO ADMISSION: Patient was modified independent with rollator in her home PTA. Pt did not drive. Children assist with grocery shopping and appointments. HOME SUPPORT PRIOR TO ADMISSION: The patient lived alone with children (x4) in the area. Pt reports completing her ADLs independently. Physical Therapy Goals  Initiated 11/20/2019  1. Patient will move from supine to sit and sit to supine  in bed with supervision/set-up within 7 day(s). 2.  Patient will transfer from bed to chair and chair to bed with supervision/set-up using the least restrictive device within 7 day(s). 3.  Patient will perform sit to stand with supervision/set-up within 7 day(s). 4.  Patient will ambulate with minimal assistance/contact guard assist for 75 feet with the least restrictive device within 7 day(s). 5.  Patient has a ramp per home needs. Outcome: Progressing Towards Goal   PHYSICAL THERAPY TREATMENT  Patient: Rohan Sheikh (66 y.o. female)  Date: 11/25/2019  Diagnosis: Dilation of common bile duct [K83.8]   <principal problem not specified>  Procedure(s) (LRB):  CHOLECYSTECTOMY LAPAROSCOPIC (N/A) 6 Days Post-Op  Precautions: Fall  Chart, physical therapy assessment, plan of care and goals were reviewed. ASSESSMENT  Patient continues with skilled PT services and is progressing towards goals. Pt continues to be limited by decreased tolerance to activity, generalized weakness, and decreased functional mobility. Pt with complaints of increased R trunk pain, but able to tolerate PT session well. Pt able to advance gait distance with RW with CGA. SpO2 desaturated to 88% on 3L post gait, but able to recover to 92% with pursed lip breathing.  Pt remains below baseline mobility status and will benefit from SNF placement upon discharge to continue therapy efforts in order to maximize functional outcomes. .     Current Level of Function Impacting Discharge (mobility/balance): CGA transfers with RW, gait training x 40 feet with RW    Other factors to consider for discharge: lives alone         PLAN :  Patient continues to benefit from skilled intervention to address the above impairments. Continue treatment per established plan of care. to address goals. Recommendation for discharge: (in order for the patient to meet his/her long term goals)  Therapy up to 5 days/week in SNF setting    This discharge recommendation:  Has been made in collaboration with the attending provider and/or case management         SUBJECTIVE:   Patient stated It's getting a little easier.     OBJECTIVE DATA SUMMARY:   Critical Behavior:  Neurologic State: Alert  Orientation Level: Oriented X4  Cognition: Appropriate decision making, Appropriate for age attention/concentration, Appropriate safety awareness  Safety/Judgement: Fall prevention  Functional Mobility Training:  Bed Mobility:     Supine to Sit: Stand-by assistance              Transfers:  Sit to Stand: Contact guard assistance  Stand to Sit: Contact guard assistance                             Balance:  Sitting: Intact  Standing: Intact; With support  Standing - Static: Constant support;Good  Standing - Dynamic : Fair  Ambulation/Gait Training:  Distance (ft): 40 Feet (ft)  Assistive Device: Gait belt;Walker, rolling(3L of oxygen)  Ambulation - Level of Assistance: Contact guard assistance        Gait Abnormalities: Decreased step clearance;Trunk sway increased        Base of Support: Widened     Speed/[place]lin: Pace decreased (<100 feet/min)  Step Length: Right shortened;Left shortened            Therapeutic Exercises:   Sit<>stand x 5 reps from bedside chair with emphasis on proper hand placement and slow, controlled descent  Pain Rating:      Activity Tolerance:   Good, desaturates with exertion and requires oxygen, and requires rest breaks  Please refer to the flowsheet for vital signs taken during this treatment.     After treatment patient left in no apparent distress:   Sitting in chair and Call bell within reach    COMMUNICATION/COLLABORATION:   The patients plan of care was discussed with: Registered Nurse    Jovita Ortez PT, DPT   Time Calculation: 25 mins

## 2019-11-25 NOTE — DISCHARGE SUMMARY
Discharge Summary       PATIENT ID: Meghana Higginbotham  MRN: [id number]   YOB: 1940    DATE OF ADMISSION: 11/16/2019  5:40 PM    DATE OF DISCHARGE: 11/25/19 10:45  PRIMARY CARE PROVIDER: Wagner Rizo MD     ATTENDING PHYSICIAN: Mary Wang  DISCHARGING PROVIDER: Jeanne Dalton MD    To contact this individual call [phone] and ask the  to page. If unavailable ask to be transferred the Adult Hospitalist Department. CONSULTATIONS: IP CONSULT TO GASTROENTEROLOGY  IP CONSULT TO GENERAL SURGERY    PROCEDURES/SURGERIES: Procedure(s):  [address] [facility] [address] COURSE:   Patient is a 57-year-old female with medical history significant for hypertension, GERD, type 2 diabetes mellitus, COPD, CADs/p PCI and stents and status post EGD in 7/19 remarkable for esophagitis who presents to the emergency department with a chief complaint of worsening abdominal pain.  Patient reports ,over the past few months she has been having intermittent crampy, abdominal pain more on the epigastric area , however over the past week it gets worse and since 3 days it became constant and was associated with nausea and nonbloody vomiting and loose stool.  She denies any fever, chills, shortness of breath, chest pain, palpitation, dark stool or melena, yellowish discoloration of the eyes, urinary or other bowel complaints. 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 In the emergency department, V/S were unremarkable.  Lab: CBC and CMP were unremarkable.  CT of the abdomen revealed New choledocholithiasis and CBD dilatation. [facility]     DISCHARGE DIAGNOSES / PLAN:      choledocholithiasis and CBD dilatation  - mild leukocytosis , liver enzymes and lipase WNL  - CT revealed choledocholithiasis and CBD dilation , consistent with acute cholecystitis  ERCP done 11/18 with biliary stent placement  Lap cholecystectomy done 11/19  -switched from  IV zosyn to PO augmentin    Stable postop course - not much nausea today     CAD s/p PCI and stents  - Continue home ASA, BB ACE   Hypertension stable on home meds- Hydralazine as needed. DM2: resume home regimen on DC   COPD - chronic resp failure- stable without exacerbation  - resume oxygen 3L via NC and home resp meds  HLD- resume home cholesterol meds- ARB=946  GERD- cont PPI/ H2 blocker/carafate   Low mag- replete IV and cont PO on DC     ADDITIONAL CARE RECOMMENDATIONS:   Discharge orders  1) low fat/diabetic diet  2) Wound care to LAMAR drain site left upper abdomen- clean area around site with betadine or chlorhexidine, cover with sterile gauze and clean dressing- change q48hrs  3) OXYGEN - chronically at 3L via NC  4) continue OT/PT  5) monitor blood sugars at least twice daily  6) continue nebulizer treatments- scheduled at least 4times daily  7) Call [phone] to schedule Surgery follow-up appointment for 2-3 weeks after hospital discharge     PENDING TEST RESULTS:   At the time of discharge the following test results are still pending: none    FOLLOW UP APPOINTMENTS:    Follow-up Information     Follow up With Specialties Details Why Contact Merissa Guzman[facility], Physician Assistant Go on 12/17/2019 at 9:30 [address] [facility] 150      Edwin Posadas MD [address] [address] [phone]               DIET: see my DC instructions    ACTIVITY: Activity as tolerated, per OT/PT    WOUND CARE: see instructions above    EQUIPMENT needed: none    DISCHARGE MEDICATIONS:  Current Discharge Medication List      START taking these medications    Details   acetaminophen (TYLENOL) 500 mg tablet Take 1 Tab by mouth every four (4) hours as needed for Pain or Fever.   Qty: 30 Tab, Refills: 0      amoxicillin-clavulanate (AUGMENTIN) 875-125 mg per tablet Take 1 Tab by mouth every twelve (12) hours. Qty: 8 Tab, Refills: 0      fenofibrate (LOFIBRA) 160 mg tablet Take 1 Tab by mouth daily. Qty: 30 Tab, Refills: 0      magnesium oxide (MAG-OX) 400 mg tablet Take 1 Tab by mouth two (2) times a day. Qty: 60 Tab, Refills: 1      polyethylene glycol (MIRALAX) 17 gram packet Take 1 Packet by mouth daily for 30 days. Qty: 30 Packet, Refills: 0      senna-docusate (PERICOLACE) 8.6-50 mg per tablet Take 2 Tabs by mouth two (2) times a day. Qty: 60 Tab, Refills: 0      morphine IR (MS IR) 15 mg tablet Take 0.5-1 Tabs by mouth every six (6) hours as needed for Pain for up to 3 days. Max Daily Amount: 60 mg.  Qty: 20 Tab, Refills: 0    Associated Diagnoses: Dilation of common bile duct      azithromycin (ZITHROMAX) 250 mg tablet Take 1 Tab by mouth every Monday, Wednesday, Friday. Qty: 6 Tab, Refills: 0      metFORMIN (GLUMETZA ER) 500 mg TG24 24 hour tablet Take 1,000 mg by mouth two (2) times a day. Qty: 60 Tab, Refills: 0      raNITIdine hcl 150 mg capsule Take 150 mg by mouth nightly. Qty: 30 Cap, Refills: 0      sucralfate (CARAFATE) 1 gram tablet Take 1 Tab by mouth four (4) times daily for 31 days. Qty: 120 Tab, Refills: 0         CONTINUE these medications which have CHANGED    Details   albuterol (PROVENTIL VENTOLIN) 2.5 mg /3 mL (0.083 %) nebu 3 mL by Nebulization route four (4) times daily. Qty: 1 Nebule, Refills: 0    Associated Diagnoses: Bronchitis      glycopyrrolate-formoterol (BEVESPI AEROSPHERE) 9-4.8 mcg HFAA Take 2 Puffs by inhalation two (2) times a day. Qty: 1 Inhaler, Refills: 0      pravastatin (PRAVACHOL) 40 mg tablet Take 1 Tab by mouth nightly. Qty: 30 Tab, Refills: 0      omeprazole (PRILOSEC) 20 mg capsule Take 1 Cap by mouth nightly. Qty: 90 Cap, Refills: 0      LANTUS SOLOSTAR U-100 INSULIN 100 unit/mL (3 mL) inpn 25 Units by SubCUTAneous route daily.   Qty: 1 Pen, Refills: 0         CONTINUE these medications which have NOT CHANGED Details   ezetimibe (ZETIA) 10 mg tablet       glimepiride (AMARYL) 2 mg tablet TAKE 3 TABLETS BY MOUTH DAILY AS DIRECTED  Refills: 5      ACCU-CHEK SHELIA PLUS TEST STRP strip TEST BLOOD SUAGR TWICE A DAY  Refills: 5      lisinopril (PRINIVIL, ZESTRIL) 10 mg tablet Take 0.5 Tabs by mouth daily. Qty: 30 Tab, Refills: 6      DULoxetine (CYMBALTA) 60 mg capsule Take 1 capsule by mouth  every day  Qty: 90 Cap, Refills: 0      therapeutic multivitamin (THERA) tablet Take 1 Tab by mouth daily. aspirin 81 mg chewable tablet Take 1 Tab by mouth daily. Qty: 30 Tab, Refills: 6      carvedilol (COREG) 6.25 mg tablet Take 1 Tab by mouth two (2) times daily (with meals). Qty: 60 Tab, Refills: 3      co-enzyme Q-10 (CO Q-10) 50 mg capsule Take 50 mg by mouth daily. albuterol (PROVENTIL HFA) 90 mcg/Actuation inhaler Take 2 Puffs by inhalation every four (4) hours as needed. Indications: BRONCHOSPASM PREVENTION      omega-3 fatty acids-vitamin e (FISH OIL) 1,000 mg Cap Take 1 Cap by mouth daily. cholesterol         STOP taking these medications       fenofibrate nanocrystallized (TRICOR) 145 mg tablet Comments:   Reason for Stopping:         mometasone (NASONEX) 50 mcg/actuation nasal spray Comments:   Reason for Stopping:         loratadine (CLARITIN) 10 mg tablet Comments:   Reason for Stopping:         fluticasone-salmeterol (ADVAIR DISKUS) 100-50 mcg/dose diskus inhaler Comments:   Reason for Stopping:         tiotropium (SPIRIVA) 18 mcg inhalation capsule Comments:   Reason for Stopping:                 NOTIFY YOUR PHYSICIAN FOR ANY OF THE FOLLOWING:   Fever over 101 degrees for 24 hours. Chest pain, shortness of breath, fever, chills, nausea, vomiting, diarrhea, change in mentation, falling, weakness, bleeding. Severe pain or pain not relieved by medications. Or, any other signs or symptoms that you may have questions about.     DISPOSITION:    Home With:   OT  PT  HH  RN      X SNF    Independent/assisted living    Hospice    Other:       PATIENT CONDITION AT DISCHARGE:     Functional status    Poor    X Deconditioned     Independent      Cognition   X  Lucid     Forgetful     Dementia      Catheters/lines (plus indication)    Rhodes     PICC     PEG    X None      Code status   X  Full code     DNR      PHYSICAL EXAMINATION AT DISCHARGE:  Patient Vitals for the past 24 hrs:   Temp Pulse Resp BP SpO2   11/25/19 0816     98 %   11/25/19 0743 97.8 °F (36.6 °C) 74 18 125/66 98 %   11/25/19 0717  77  125/60    11/24/19 2355 97.7 °F (36.5 °C) 76 18 140/67 98 %   11/24/19 2100     97 %   11/24/19 1522 98.6 °F (37 °C) 75 18 152/71 100 %   11/24/19 1104 98.6 °F (37 °C) 70 18 106/54 96 %     General:          Alert, cooperative, no distress, appears stated age. HEENT:           Atraumatic, anicteric sclerae, pink conjunctivae                          No oral ulcers, mucosa moist, throat clear, dentition fair  Neck:               Supple, symmetrical  Lungs:             Clear to auscultation bilaterally. No Wheezing or Rhonchi. No rales. Chest wall:      No tenderness  No Accessory muscle use. Heart:              Regular  rhythm,  No  murmur   No edema  Abdomen:        Soft,mild-tenderness. Not distended. LAMAR drain site without erythema  Extremities:     No cyanosis. No clubbing,                            Skin turgor normal, Capillary refill normal  Skin:                Not pale. Not Jaundiced  No rashes   Psych:             Not anxious or agitated.   Neurologic:      Alert, moves all extremities, answers questions appropriately and responds to commands   Recent Results (from the past 24 hour(s))   GLUCOSE, POC    Collection Time: 11/24/19 10:57 AM   Result Value Ref Range    Glucose (POC) 249 (H) 65 - 100 mg/dL    Performed by Marcos Fatima    GLUCOSE, POC    Collection Time: 11/24/19  4:29 PM   Result Value Ref Range    Glucose (POC) 152 (H) 65 - 100 mg/dL    Performed by Raina Tanner POC Collection Time: 11/24/19  9:21 PM   Result Value Ref Range    Glucose (POC) 149 (H) 65 - 100 mg/dL    Performed by Mica Hoover    HEPATIC FUNCTION PANEL    Collection Time: 11/25/19  5:00 AM   Result Value Ref Range    Protein, total 5.1 (L) 6.4 - 8.2 g/dL    Albumin 2.2 (L) 3.5 - 5.0 g/dL    Globulin 2.9 2.0 - 4.0 g/dL    A-G Ratio 0.8 (L) 1.1 - 2.2      Bilirubin, total 0.4 0.2 - 1.0 MG/DL    Bilirubin, direct 0.2 0.0 - 0.2 MG/DL    Alk.  phosphatase 94 45 - 117 U/L    AST (SGOT) 14 (L) 15 - 37 U/L    ALT (SGPT) 45 12 - 78 U/L   TSH 3RD GENERATION    Collection Time: 11/25/19  5:00 AM   Result Value Ref Range    TSH 5.02 (H) 0.36 - 3.74 uIU/mL   GLUCOSE, POC    Collection Time: 11/25/19  6:16 AM   Result Value Ref Range    Glucose (POC) 107 (H) 65 - 100 mg/dL    Performed by Sudeep Gracia          CHRONIC MEDICAL DIAGNOSES:  Problem List as of 11/25/2019 Date Reviewed: 11/19/2019          Codes Class Noted - Resolved    Dilation of common bile duct ICD-10-CM: K83.8  ICD-9-CM: 576.8  11/16/2019 - Present        Dizziness and giddiness ICD-10-CM: R42  ICD-9-CM: 780.4  7/21/2015 - Present        COPD exacerbation ([facility].) ICD-10-CM: J44.1  ICD-9-CM: 491.21  1/1/2013 - Present        Depression ICD-10-CM: F32.9  ICD-9-CM: 311  2/29/2012 - Present        Vitamin D deficiency ICD-10-CM: E55.9  ICD-9-CM: 268.9  2/29/2012 - Present        Chronic low back pain ICD-10-CM: M54.5, G89.29  ICD-9-CM: 724.2, 338.29  6/8/2011 - Present        DM (diabetes mellitus) ([facility] ICD-10-CM: E11.9  ICD-9-CM: 250.00  6/4/2010 - Present        Hyperlipidemia ICD-10-CM: E78.5  ICD-9-CM: 272.4  6/4/2010 - Present        HTN (hypertension) ICD-10-CM: I10  ICD-9-CM: 401.9  6/4/2010 - Present        RESOLVED: Chest pain ICD-10-CM: R07.9  ICD-9-CM: 786.50  11/13/2016 - 11/16/2016        RESOLVED: Tobacco use disorder ICD-10-CM: W96.824  ICD-9-CM: 305.1  8/27/2010 - 12/24/2013              Greater than 30 minutes were spent with the patient on counseling and coordination of care    Signed:   Chris Gee MD  11/25/2019  10:45 AM

## 2019-11-25 NOTE — PROGRESS NOTES
Bedside shift change report given to Lis (oncoming nurse) by Dennis Montilla (offgoing nurse). Report included the following information SBAR.

## 2019-11-25 NOTE — DISCHARGE INSTRUCTIONS
Discharge orders  1) low fat/diabetic diet  2) Wound care to LAMAR drain site left upper abdomen- clean area around site with betadine or chlorhexidine, cover with sterile gauze and clean dressing- change q48hrs  3) OXYGEN - chronically at 3L via NC  4) continue OT/PT  5) monitor blood sugars at least twice daily  6) continue nebulizer treatments- scheduled at least 4times daily  7) Call [phone] to schedule Surgery follow-up appointment for 2-3 weeks after hospital discharge       [facility]  Discharge Instructions    Last Procedure: CHOLECYSTECTOMY LAPAROSCOPIC      FOLLOW-UP:  Follow-up with Dr. Lovett Gowers or Nurse Practitioner in 10-14 days. Call office at [phone] to schedule an appointment at your convenience. Please arrive by 20 minutes before scheduled appointment time to complete paperwork. Make sure to bring your ID card and insurance information. We are located at [facility] in the [facility]. Call your physician if you have persistent fevers greater than 101 accompanied by fatigue, sweats or chills, drainage from your wound that is not clear or looks infected, increasing abdominal pain, problems urinating, or persistent nausea/vomiting. ACTIVITY:  You may want to hug a pillow when coughing and sneezing to add additional support to the surgical area(s) which will decrease pain during these times. You are encouraged to walk and engage in light activity for the next two weeks. You should not lift more than 20 pounds during this time frame as it could put you at increased risk for a post-operative hernia. Twenty pounds is roughly equivalent to a plastic bag of groceries. · Most people are able to return to work within 1 to 2 weeks after surgery. · You may shower 24 hours after surgery. Pat the cut (incision) dry. Do not take a bath for the first week. · No driving while you are taking narcotics.       DIET:  You may eat any foods that you can tolerate. You may find it helpful to eat smaller sized, light meals more frequently for the first few days following surgery. It is a good idea to eat a high fiber diet and take in plenty of fluids to prevent constipation. If you do become constipated you may want to take a mild laxative or take ducolax tablets on a daily basis until your bowel habits are regular. Constipation can be very uncomfortable, along with straining, after recent abdominal surgery. WOUND CARE INSTRUCTIONS:   You may shower at home. Do not soak in a bath tub or swimming pool for one week after surgery. If clothing rubs against the wound or causes irritation and the wound is not draining you may cover it with a dry dressing during the daytime. Try to keep the wound dry and avoid ointments on the wound unless directed to do so. If the wound becomes bright red and painful or starts to drain infected material that is not clear, please contact your physician immediately. You should also call if you begin to drain fluid that is thin and greenish-brown from the wound and appears to look like bile. If the wound though is mildly pink and has a thick firm ridge underneath it, this is normal, and is referred to as a healing ridge. This will resolve over the next 4-6 weeks. MEDICATIONS:  Try to take narcotic medications and anti-inflammatory medications, such as tylenol, ibuprofen, naprosyn, etc., with food. This will minimize stomach upset from the medication. Should you develop nausea and vomiting from the pain medication, or develop a rash, please discontinue the medication and contact your physician. You should not drive, make important decisions, or operate machinery when taking narcotic pain medication. · It is important that you take the medication exactly as they are prescribed.    · Keep your medication in the bottles provided by the pharmacist and keep a list of the medication names, dosages, and times to be taken in your wallet. · Do not take other medications without consulting your doctor. QUESTIONS:  Please feel free to call the office ([phone]) if you have any questions, and they will be glad to assist you. Cholecystectomy: What to Expect at [address] [place] [address]  After your surgery, it is normal to feel weak and tired for several days after you return home. Your belly may be swollen. If you had laparoscopic surgery, you may also have pain in your shoulder for about 24 hours. You may have gas or need to burp a lot at first, and a few people get diarrhea. The diarrhea usually goes away in 2 to 4 weeks, but it may last longer. How quickly you recover depends on whether you had a laparoscopic or open surgery. · For a laparoscopic surgery, most people can go back to work or their normal routine in 1 to 2 weeks, but it may take longer, depending on the type of work you do. · For an open surgery, it will probably take 4 to 6 weeks before you get back to your normal routine. This care sheet gives you a general idea about how long it will take for you to recover. However, each person recovers at a different pace. Follow the steps below to get better as quickly as possible. How can you care for yourself at home? Activity    · Rest when you feel tired. Getting enough sleep will help you recover.     · Try to walk each day. Start out by walking a little more than you did the day before. Gradually increase the amount you walk. Walking boosts blood flow and helps prevent pneumonia and constipation.     · For about 2 to 4 weeks, avoid lifting anything that would make you strain. This may include a child, heavy grocery bags and milk containers, a heavy briefcase or backpack, cat litter or dog food bags, or a vacuum .     · Avoid strenuous activities, such as biking, jogging, weightlifting, and aerobic exercise, until your doctor says it is okay.     · You may shower 48 hours after surgery.  Pat the cuts (incisions) dry. Do not take a bath for the first 2 weeks, or until your doctor tells you it is okay.     · You may drive when you are no longer taking pain medicine and can quickly move your foot from the gas pedal to the brake. You must also be able to sit comfortably for a long period of time, even if you do not plan to go far. You might get caught in traffic.     · For a laparoscopic surgery, most people can go back to work or their normal routine in 1 to 2 weeks, but it may take longer. For an open surgery, it will probably take 4 to 6 weeks before you get back to your normal routine.     · Your doctor will tell you when you can have sex again.    Diet    · Eat smaller meals more often instead of fewer larger meals. You can eat a normal diet, but avoid eating fatty foods for about 1 month. Fatty foods include hamburger, whole milk, cheese, and many snack foods. If your stomach is upset, try bland, low-fat foods like plain rice, broiled chicken, toast, and yogurt.     · Drink plenty of fluids (unless your doctor tells you not to).   · If you have diarrhea, try avoiding spicy foods, dairy products, fatty foods, and alcohol. You can also watch to see if specific foods cause it, and stop eating them. If the diarrhea continues for more than 2 weeks, talk to your doctor.     · You may notice that your bowel movements are not regular right after your surgery. This is common. Try to avoid constipation and straining with bowel movements. You may want to take a fiber supplement every day. If you have not had a bowel movement after a couple of days, ask your doctor about taking a mild laxative. Medicines    · Your doctor will tell you if and when you can restart your medicines. He or she will also give you instructions about taking any new medicines.     · If you take blood thinners, such as warfarin (Coumadin), clopidogrel (Plavix), or aspirin, be sure to talk to your doctor.  He or she will tell you if and when to start taking those medicines again. Make sure that you understand exactly what your doctor wants you to do.     · Take pain medicines exactly as directed. ? If the doctor gave you a prescription medicine for pain, take it as prescribed. ? If you are not taking a prescription pain medicine, take an over-the-counter medicine such as acetaminophen (Tylenol), ibuprofen (Advil, Motrin), or naproxen (Aleve). Read and follow all instructions on the label. ? Do not take two or more pain medicines at the same time unless the doctor told you to. Many pain medicines contain acetaminophen, which is Tylenol. Too much Tylenol can be harmful.     · If you think your pain medicine is making you sick to your stomach:  ? Take your medicine after meals (unless your doctor tells you not to). ? Ask your doctor for a different pain medicine.     · If your doctor prescribed antibiotics, take them as directed. Do not stop taking them just because you feel better. You need to take the full course of antibiotics. Incision care    · If you have strips of tape on the incision, or cut, leave the tape on for a week or until it falls off.     · After 24 to 48 hours, wash the area daily with warm, soapy water, and pat it dry.     · You may have staples to hold the cut together. Keep them dry until your doctor takes them out. This is usually in 7 to 10 days.     · Keep the area clean and dry. You may cover it with a gauze bandage if it weeps or rubs against clothing. Change the bandage every day.    Ice    · To reduce swelling and pain, put ice or a cold pack on your belly for 10 to 20 minutes at a time. Do this every 1 to 2 hours. Put a thin cloth between the ice and your skin. Follow-up care is a key part of your treatment and safety. Be sure to make and go to all appointments, and call your doctor if you are having problems. It's also a good idea to know your test results and keep a list of the medicines you take.   When should you call for help? Call 911 anytime you think you may need emergency care. For example, call if:    · You passed out (lost consciousness).     · You are short of breath. Myers Peat your doctor now or seek immediate medical care if:    · You are sick to your stomach and cannot drink fluids.     · You have pain that does not get better when you take your pain medicine.     · You cannot pass stools or gas.     · You have signs of infection, such as:  ? Increased pain, swelling, warmth, or redness. ? Red streaks leading from the incision. ? Pus draining from the incision. ? A fever.     · Bright red blood has soaked through the bandage over your incision.     · You have loose stitches, or your incision comes open.     · You have signs of a blood clot in your leg (called a deep vein thrombosis), such as:  ? Pain in your calf, back of knee, thigh, or groin. ? Redness and swelling in your leg or groin.    Watch closely for any changes in your health, and be sure to contact your doctor if you have any problems. Where can you learn more? Go to http://moses-dafne.info/. Enter 351 79 648 in the search box to learn more about \"Cholecystectomy: What to Expect at Home. \"  Current as of: November 7, 2018  Content Version: 12.2  © 9627-3792 [employer]. Care instructions adapted under license by [employer] (which disclaims liability or warranty for this information). If you have questions about a medical condition or this instruction, always ask your healthcare professional. [place] [address] any warranty or liability for your use of this information.

## 2019-11-25 NOTE — PROGRESS NOTES
Bedside and Verbal shift change report given to 67 Natalia Knapp (oncoming nurse) by Tram Meraz RN (offgoing nurse). Report included the following information SBAR.

## 2019-11-25 NOTE — PROGRESS NOTES
[facility]:    Patient has discharge orders. CM confirmed acceptance at [facility]. Patient will be in room 609. Son in room and notified of plan. [facility] to  patient at 2pm.    RN notified/AVS updated.     Call Report to [address], RN/CRM

## 2019-12-02 NOTE — CDMP QUERY
Pt admitted with choledocholithiasis and CBD dilatation, noted to have COPD and chronic home O2 use. After further study, is it possible to determine if you were evaluating and/or treating any of the following:  Chronic respiratory failure  COPD only- no chronic respiratory failure  Other, please specify  Clinically unable to determine The medical record reflects the following: 
   Risk Factors: COPD, chronic home O2 Clinical Indicators: 11/18 CM notes pt on 3L O2 at home  DCS- COPD - resume oxygen 3L via NC and home resp meds  pt on 3L O2 via NC during hospital stay Treatment: O2 3L NC during hospitalization and d/c on same Thank you, Mitch Avilez RN 
[facility] 
[phone]

## 2020-05-08 RX ORDER — AZELASTINE HCL 205.5 UG/1
SPRAY NASAL DAILY
COMMUNITY

## 2020-05-08 RX ORDER — SUCRALFATE 1 G/1
1 TABLET ORAL 4 TIMES DAILY
COMMUNITY

## 2020-05-08 RX ORDER — ONDANSETRON 4 MG/1
4 TABLET, ORALLY DISINTEGRATING ORAL
COMMUNITY

## 2020-05-08 NOTE — PERIOP NOTES
[facility]  Preoperative Instructions        Surgery Date 5/15/20          Time of Arrival 0830    1. On the day of your surgery, please report to the Surgical Services Registration Desk and sign in at your designated time. The Surgery Center is located to the right of the Emergency Room. 2. You must have someone with you to drive you home. You should not drive a car for 24 hours following surgery. Please make arrangements for a friend or family member to stay with you for the first 24 hours after your surgery. 3. Do not have anything to eat or drink (including water, gum, mints, coffee, juice) after midnight ? 5/14/20? Yossi Founds ? This may not apply to medications prescribed by your physician. ?(Please note below the special instructions with medications to take the morning of your procedure.)    4. We recommend you do not drink any alcoholic beverages for 24 hours before and after your surgery. 5. Contact your surgeons office for instructions on the following medications: non-steroidal anti-inflammatory drugs (i.e. Advil, Aleve), vitamins, and supplements. (Some surgeons will want you to stop these medications prior to surgery and others may allow you to take them)  **If you are currently taking Plavix, Coumadin, Aspirin and/or other blood-thinning agents, contact your surgeon for instructions. ** Your surgeon will partner with the physician prescribing these medications to determine if it is safe to stop or if you need to continue taking. Please do not stop taking these medications without instructions from your surgeon    6. Wear comfortable clothes. Wear glasses instead of contacts. Do not bring any money or jewelry. Please bring picture ID, insurance card, and any prearranged co-payment or hospital payment. Do not wear make-up, particularly mascara the morning of your surgery. Do not wear nail polish, particularly if you are having foot /hand surgery.   Wear your hair loose or down, no ponytails, buns, tung pins or clips. All body piercings must be removed. Please shower with antibacterial soap for three consecutive days before and on the morning of surgery, but do not apply any lotions, powders or deodorants after the shower on the day of surgery. Please use a fresh towels after each shower. Please sleep in clean clothes and change bed linens the night before surgery. Please do not shave for 48 hours prior to surgery. Shaving of the face is acceptable. 7. You should understand that if you do not follow these instructions your surgery may be cancelled. If your physical condition changes (I.e. fever, cold or flu) please contact your surgeon as soon as possible. 8. It is important that you be on time. If a situation occurs where you may be late, please call [phone] (OR Holding Area). 9. If you have any questions and or problems, please call [phone] (Pre-admission Testing). 10. Your surgery time may be subject to change. You will receive a phone call the evening prior if your time changes. 11.  If having outpatient surgery, you must have someone to drive you here, stay with you during the duration of your stay, and to drive you home at time of discharge. 12.   In an effort to improve the efficiency, privacy, and safety for all of our Pre-op patients visitors are not allowed in the Holding area. Once you arrive and are registered your family/visitors will be asked to remain in your vehicle. The Pre-op staff will call you when they are ready for you to enter the building and will explain to you and your family/visitors that the Pre-op phase is beginning. At this time, if your procedure is outpatient, your family member will be asked to stay in their vehicle until the surgery is complete and you are ready for discharge.  If you are going to be admitted after your surgery, once you are called to come inside the building, your family will be able to leave the parking lot. At this time the staff will also ask for your designated spokesperson information in the event that the physician or staff need to provide an update or obtain any pertinent information. The designated spokesperson will be notified if the physician needs to speak to family during the pre-operative phase.and/or in the post op phase. Special Instructions: do not need to stop aspirin until DOS    TAKE ALL MEDICATIONS DAY OF SURGERY EXCEPT:  Aspirin, glimepiride, take 5 units of Lantus, lisinopril,     I understand a pre-operative phone call will be made to verify my surgery time. In the event that I am not available, I give permission for a message to be left on my answering service and/or with another person?   Yes [phone]         ___________________      __________   _________    (Signature of Patient)             (Witness)                (Date and Time)

## 2020-05-08 NOTE — PERIOP NOTES
Spoke to Jonelle Fitzgerald in MD office, patient is to hold aspiring on the morning of procedure only.

## 2020-05-12 ENCOUNTER — HOSPITAL ENCOUNTER (OUTPATIENT)
Dept: PREADMISSION TESTING | Age: 80
Discharge: HOME | End: 2020-05-12
Attending: INTERNAL MEDICINE
Payer: MEDICARE

## 2020-05-12 PROCEDURE — 87635 SARS-COV-2 COVID-19 AMP PRB: CPT

## 2020-05-15 ENCOUNTER — ANESTHESIA (OUTPATIENT)
Dept: SURGERY | Age: 80
End: 2020-05-15
Payer: MEDICARE

## 2020-05-15 ENCOUNTER — APPOINTMENT (OUTPATIENT)
Dept: GENERAL RADIOLOGY | Age: 80
End: 2020-05-15
Attending: INTERNAL MEDICINE
Payer: MEDICARE

## 2020-05-15 ENCOUNTER — HOSPITAL ENCOUNTER (OUTPATIENT)
Age: 80
Setting detail: OUTPATIENT SURGERY
Discharge: HOME | End: 2020-05-15
Attending: INTERNAL MEDICINE | Admitting: INTERNAL MEDICINE
Payer: MEDICARE

## 2020-05-15 ENCOUNTER — ANESTHESIA EVENT (OUTPATIENT)
Dept: SURGERY | Age: 80
End: 2020-05-15
Payer: MEDICARE

## 2020-05-15 VITALS
OXYGEN SATURATION: 98 % | WEIGHT: 187.83 LBS | HEART RATE: 80 BPM | RESPIRATION RATE: 19 BRPM | SYSTOLIC BLOOD PRESSURE: 149 MMHG | TEMPERATURE: 98.2 F | BODY MASS INDEX: 31.29 KG/M2 | DIASTOLIC BLOOD PRESSURE: 80 MMHG | HEIGHT: 65 IN

## 2020-05-15 LAB
GLUCOSE BLD STRIP.AUTO-MCNC: 231 MG/DL (ref 65–100)
GLUCOSE BLD STRIP.AUTO-MCNC: 238 MG/DL (ref 65–100)
SARS-COV-2, COV2NT: NOT DETECTED
SERVICE CMNT-IMP: ABNORMAL
SERVICE CMNT-IMP: ABNORMAL
SPECIMEN SOURCE, FCOV2M: NORMAL

## 2020-05-15 PROCEDURE — 74011250636 HC RX REV CODE- 250/636: Performed by: NURSE ANESTHETIST, CERTIFIED REGISTERED

## 2020-05-15 PROCEDURE — 77030018836 HC SOL IRR NACL ICUM -A: Performed by: INTERNAL MEDICINE

## 2020-05-15 PROCEDURE — 77030012596 HC SPHNTOM BILI BSC -E: Performed by: INTERNAL MEDICINE

## 2020-05-15 PROCEDURE — 74011636320 HC RX REV CODE- 636/320: Performed by: INTERNAL MEDICINE

## 2020-05-15 PROCEDURE — 77030019988 HC FCPS ENDOSC DISP BSC -B: Performed by: INTERNAL MEDICINE

## 2020-05-15 PROCEDURE — C1769 GUIDE WIRE: HCPCS | Performed by: INTERNAL MEDICINE

## 2020-05-15 PROCEDURE — 76210000006 HC OR PH I REC 0.5 TO 1 HR: Performed by: INTERNAL MEDICINE

## 2020-05-15 PROCEDURE — 74330 X-RAY BILE/PANC ENDOSCOPY: CPT

## 2020-05-15 PROCEDURE — 74011000250 HC RX REV CODE- 250: Performed by: INTERNAL MEDICINE

## 2020-05-15 PROCEDURE — 77030040922 HC BLNKT HYPOTHRM STRY -A

## 2020-05-15 PROCEDURE — 82962 GLUCOSE BLOOD TEST: CPT

## 2020-05-15 PROCEDURE — 76060000033 HC ANESTHESIA 1 TO 1.5 HR: Performed by: INTERNAL MEDICINE

## 2020-05-15 PROCEDURE — 77030008684 HC TU ET CUF COVD -B: Performed by: ANESTHESIOLOGY

## 2020-05-15 PROCEDURE — 88305 TISSUE EXAM BY PATHOLOGIST: CPT

## 2020-05-15 PROCEDURE — 74011000258 HC RX REV CODE- 258: Performed by: INTERNAL MEDICINE

## 2020-05-15 PROCEDURE — 74011250637 HC RX REV CODE- 250/637: Performed by: INTERNAL MEDICINE

## 2020-05-15 PROCEDURE — 77030012894

## 2020-05-15 PROCEDURE — 77030040361 HC SLV COMPR DVT MDII -B: Performed by: INTERNAL MEDICINE

## 2020-05-15 PROCEDURE — 77030009038 HC CATH BILI STN RTVR BSC -C: Performed by: INTERNAL MEDICINE

## 2020-05-15 PROCEDURE — 74011250636 HC RX REV CODE- 250/636: Performed by: ANESTHESIOLOGY

## 2020-05-15 PROCEDURE — 77030007288 HC DEV LOK BILI BSC -A: Performed by: INTERNAL MEDICINE

## 2020-05-15 PROCEDURE — 76010000149 HC OR TIME 1 TO 1.5 HR: Performed by: INTERNAL MEDICINE

## 2020-05-15 PROCEDURE — 76210000026 HC REC RM PH II 1 TO 1.5 HR: Performed by: INTERNAL MEDICINE

## 2020-05-15 PROCEDURE — 74011000250 HC RX REV CODE- 250: Performed by: NURSE ANESTHETIST, CERTIFIED REGISTERED

## 2020-05-15 RX ORDER — NALOXONE HYDROCHLORIDE 0.4 MG/ML
0.4 INJECTION, SOLUTION INTRAMUSCULAR; INTRAVENOUS; SUBCUTANEOUS
Status: ACTIVE | OUTPATIENT
Start: 2020-05-15 | End: 2020-05-15

## 2020-05-15 RX ORDER — SODIUM CHLORIDE, SODIUM LACTATE, POTASSIUM CHLORIDE, CALCIUM CHLORIDE 600; 310; 30; 20 MG/100ML; MG/100ML; MG/100ML; MG/100ML
125 INJECTION, SOLUTION INTRAVENOUS CONTINUOUS
Status: DISCONTINUED | OUTPATIENT
Start: 2020-05-15 | End: 2020-05-15 | Stop reason: HOSPADM

## 2020-05-15 RX ORDER — LIDOCAINE HYDROCHLORIDE 20 MG/ML
INJECTION, SOLUTION EPIDURAL; INFILTRATION; INTRACAUDAL; PERINEURAL AS NEEDED
Status: DISCONTINUED | OUTPATIENT
Start: 2020-05-15 | End: 2020-05-15 | Stop reason: HOSPADM

## 2020-05-15 RX ORDER — OXYCODONE HYDROCHLORIDE 5 MG/1
5 TABLET ORAL AS NEEDED
Status: DISCONTINUED | OUTPATIENT
Start: 2020-05-15 | End: 2020-05-15 | Stop reason: HOSPADM

## 2020-05-15 RX ORDER — DIPHENHYDRAMINE HYDROCHLORIDE 50 MG/ML
12.5 INJECTION, SOLUTION INTRAMUSCULAR; INTRAVENOUS AS NEEDED
Status: DISCONTINUED | OUTPATIENT
Start: 2020-05-15 | End: 2020-05-15 | Stop reason: HOSPADM

## 2020-05-15 RX ORDER — MIDAZOLAM HYDROCHLORIDE 1 MG/ML
1 INJECTION, SOLUTION INTRAMUSCULAR; INTRAVENOUS AS NEEDED
Status: DISCONTINUED | OUTPATIENT
Start: 2020-05-15 | End: 2020-05-15 | Stop reason: HOSPADM

## 2020-05-15 RX ORDER — LIDOCAINE HYDROCHLORIDE 10 MG/ML
0.1 INJECTION, SOLUTION EPIDURAL; INFILTRATION; INTRACAUDAL; PERINEURAL AS NEEDED
Status: DISCONTINUED | OUTPATIENT
Start: 2020-05-15 | End: 2020-05-15 | Stop reason: HOSPADM

## 2020-05-15 RX ORDER — PROPOFOL 10 MG/ML
INJECTION, EMULSION INTRAVENOUS AS NEEDED
Status: DISCONTINUED | OUTPATIENT
Start: 2020-05-15 | End: 2020-05-15 | Stop reason: HOSPADM

## 2020-05-15 RX ORDER — ROCURONIUM BROMIDE 10 MG/ML
INJECTION, SOLUTION INTRAVENOUS AS NEEDED
Status: DISCONTINUED | OUTPATIENT
Start: 2020-05-15 | End: 2020-05-15 | Stop reason: HOSPADM

## 2020-05-15 RX ORDER — SODIUM CHLORIDE 0.9 % (FLUSH) 0.9 %
5-40 SYRINGE (ML) INJECTION AS NEEDED
Status: DISCONTINUED | OUTPATIENT
Start: 2020-05-15 | End: 2020-05-15 | Stop reason: HOSPADM

## 2020-05-15 RX ORDER — ACETAMINOPHEN 325 MG/1
650 TABLET ORAL ONCE
Status: COMPLETED | OUTPATIENT
Start: 2020-05-15 | End: 2020-05-15

## 2020-05-15 RX ORDER — ONDANSETRON 2 MG/ML
4 INJECTION INTRAMUSCULAR; INTRAVENOUS AS NEEDED
Status: DISCONTINUED | OUTPATIENT
Start: 2020-05-15 | End: 2020-05-15 | Stop reason: HOSPADM

## 2020-05-15 RX ORDER — SODIUM CHLORIDE 0.9 % (FLUSH) 0.9 %
5-40 SYRINGE (ML) INJECTION EVERY 8 HOURS
Status: DISCONTINUED | OUTPATIENT
Start: 2020-05-15 | End: 2020-05-15 | Stop reason: HOSPADM

## 2020-05-15 RX ORDER — FENTANYL CITRATE 50 UG/ML
50 INJECTION, SOLUTION INTRAMUSCULAR; INTRAVENOUS AS NEEDED
Status: DISCONTINUED | OUTPATIENT
Start: 2020-05-15 | End: 2020-05-15 | Stop reason: HOSPADM

## 2020-05-15 RX ORDER — ONDANSETRON 2 MG/ML
INJECTION INTRAMUSCULAR; INTRAVENOUS AS NEEDED
Status: DISCONTINUED | OUTPATIENT
Start: 2020-05-15 | End: 2020-05-15 | Stop reason: HOSPADM

## 2020-05-15 RX ORDER — MIDAZOLAM HYDROCHLORIDE 1 MG/ML
.25-5 INJECTION, SOLUTION INTRAMUSCULAR; INTRAVENOUS
Status: ACTIVE | OUTPATIENT
Start: 2020-05-15 | End: 2020-05-15

## 2020-05-15 RX ORDER — FENTANYL CITRATE 50 UG/ML
INJECTION, SOLUTION INTRAMUSCULAR; INTRAVENOUS AS NEEDED
Status: DISCONTINUED | OUTPATIENT
Start: 2020-05-15 | End: 2020-05-15 | Stop reason: HOSPADM

## 2020-05-15 RX ORDER — PHENYLEPHRINE HCL IN 0.9% NACL 0.4MG/10ML
SYRINGE (ML) INTRAVENOUS AS NEEDED
Status: DISCONTINUED | OUTPATIENT
Start: 2020-05-15 | End: 2020-05-15 | Stop reason: HOSPADM

## 2020-05-15 RX ORDER — DEXAMETHASONE SODIUM PHOSPHATE 4 MG/ML
INJECTION, SOLUTION INTRA-ARTICULAR; INTRALESIONAL; INTRAMUSCULAR; INTRAVENOUS; SOFT TISSUE AS NEEDED
Status: DISCONTINUED | OUTPATIENT
Start: 2020-05-15 | End: 2020-05-15 | Stop reason: HOSPADM

## 2020-05-15 RX ORDER — MIDAZOLAM HYDROCHLORIDE 1 MG/ML
INJECTION, SOLUTION INTRAMUSCULAR; INTRAVENOUS AS NEEDED
Status: DISCONTINUED | OUTPATIENT
Start: 2020-05-15 | End: 2020-05-15 | Stop reason: HOSPADM

## 2020-05-15 RX ORDER — FENTANYL CITRATE 50 UG/ML
25 INJECTION, SOLUTION INTRAMUSCULAR; INTRAVENOUS
Status: ACTIVE | OUTPATIENT
Start: 2020-05-15 | End: 2020-05-15

## 2020-05-15 RX ORDER — ATROPINE SULFATE 0.1 MG/ML
0.5 INJECTION INTRAVENOUS
Status: DISCONTINUED | OUTPATIENT
Start: 2020-05-15 | End: 2020-05-15 | Stop reason: HOSPADM

## 2020-05-15 RX ORDER — EPINEPHRINE 0.1 MG/ML
1 INJECTION INTRACARDIAC; INTRAVENOUS
Status: DISCONTINUED | OUTPATIENT
Start: 2020-05-15 | End: 2020-05-15 | Stop reason: HOSPADM

## 2020-05-15 RX ORDER — DEXTROMETHORPHAN/PSEUDOEPHED 2.5-7.5/.8
1.2 DROPS ORAL
Status: DISCONTINUED | OUTPATIENT
Start: 2020-05-15 | End: 2020-05-15 | Stop reason: HOSPADM

## 2020-05-15 RX ORDER — FLUMAZENIL 0.1 MG/ML
0.2 INJECTION INTRAVENOUS
Status: ACTIVE | OUTPATIENT
Start: 2020-05-15 | End: 2020-05-15

## 2020-05-15 RX ORDER — GLYCOPYRROLATE 0.2 MG/ML
INJECTION INTRAMUSCULAR; INTRAVENOUS AS NEEDED
Status: DISCONTINUED | OUTPATIENT
Start: 2020-05-15 | End: 2020-05-15 | Stop reason: HOSPADM

## 2020-05-15 RX ORDER — MORPHINE SULFATE 10 MG/ML
2 INJECTION, SOLUTION INTRAMUSCULAR; INTRAVENOUS
Status: DISCONTINUED | OUTPATIENT
Start: 2020-05-15 | End: 2020-05-15 | Stop reason: HOSPADM

## 2020-05-15 RX ORDER — NEOSTIGMINE METHYLSULFATE 1 MG/ML
INJECTION, SOLUTION INTRAVENOUS AS NEEDED
Status: DISCONTINUED | OUTPATIENT
Start: 2020-05-15 | End: 2020-05-15 | Stop reason: HOSPADM

## 2020-05-15 RX ORDER — SODIUM CHLORIDE, SODIUM LACTATE, POTASSIUM CHLORIDE, CALCIUM CHLORIDE 600; 310; 30; 20 MG/100ML; MG/100ML; MG/100ML; MG/100ML
INJECTION, SOLUTION INTRAVENOUS
Status: DISCONTINUED | OUTPATIENT
Start: 2020-05-15 | End: 2020-05-15 | Stop reason: HOSPADM

## 2020-05-15 RX ORDER — EPHEDRINE SULFATE/0.9% NACL/PF 50 MG/5 ML
SYRINGE (ML) INTRAVENOUS AS NEEDED
Status: DISCONTINUED | OUTPATIENT
Start: 2020-05-15 | End: 2020-05-15 | Stop reason: HOSPADM

## 2020-05-15 RX ORDER — SODIUM CHLORIDE 9 MG/ML
25 INJECTION, SOLUTION INTRAVENOUS CONTINUOUS
Status: DISCONTINUED | OUTPATIENT
Start: 2020-05-15 | End: 2020-05-15 | Stop reason: HOSPADM

## 2020-05-15 RX ORDER — MIDAZOLAM HYDROCHLORIDE 1 MG/ML
0.5 INJECTION, SOLUTION INTRAMUSCULAR; INTRAVENOUS
Status: DISCONTINUED | OUTPATIENT
Start: 2020-05-15 | End: 2020-05-15 | Stop reason: HOSPADM

## 2020-05-15 RX ORDER — HYDROMORPHONE HYDROCHLORIDE 1 MG/ML
0.2 INJECTION, SOLUTION INTRAMUSCULAR; INTRAVENOUS; SUBCUTANEOUS
Status: DISCONTINUED | OUTPATIENT
Start: 2020-05-15 | End: 2020-05-15 | Stop reason: HOSPADM

## 2020-05-15 RX ORDER — SODIUM CHLORIDE 9 MG/ML
75 INJECTION, SOLUTION INTRAVENOUS CONTINUOUS
Status: DISCONTINUED | OUTPATIENT
Start: 2020-05-15 | End: 2020-05-15 | Stop reason: HOSPADM

## 2020-05-15 RX ORDER — SODIUM CHLORIDE, SODIUM LACTATE, POTASSIUM CHLORIDE, CALCIUM CHLORIDE 600; 310; 30; 20 MG/100ML; MG/100ML; MG/100ML; MG/100ML
25 INJECTION, SOLUTION INTRAVENOUS CONTINUOUS
Status: DISCONTINUED | OUTPATIENT
Start: 2020-05-15 | End: 2020-05-15 | Stop reason: HOSPADM

## 2020-05-15 RX ORDER — FENTANYL CITRATE 50 UG/ML
25 INJECTION, SOLUTION INTRAMUSCULAR; INTRAVENOUS
Status: DISCONTINUED | OUTPATIENT
Start: 2020-05-15 | End: 2020-05-15 | Stop reason: HOSPADM

## 2020-05-15 RX ORDER — SUCCINYLCHOLINE CHLORIDE 20 MG/ML
INJECTION INTRAMUSCULAR; INTRAVENOUS AS NEEDED
Status: DISCONTINUED | OUTPATIENT
Start: 2020-05-15 | End: 2020-05-15 | Stop reason: HOSPADM

## 2020-05-15 RX ADMIN — FENTANYL CITRATE 50 MCG: 50 INJECTION, SOLUTION INTRAMUSCULAR; INTRAVENOUS at 11:20

## 2020-05-15 RX ADMIN — ROCURONIUM BROMIDE 10 MG: 10 INJECTION INTRAVENOUS at 11:21

## 2020-05-15 RX ADMIN — SUCCINYLCHOLINE CHLORIDE 160 MG: 20 INJECTION, SOLUTION INTRAMUSCULAR; INTRAVENOUS at 11:22

## 2020-05-15 RX ADMIN — PROPOFOL 150 MG: 10 INJECTION, EMULSION INTRAVENOUS at 11:21

## 2020-05-15 RX ADMIN — Medication 3 AMPULE: at 10:05

## 2020-05-15 RX ADMIN — CEFOTETAN DISODIUM 1 G: 1 INJECTION, POWDER, FOR SOLUTION INTRAMUSCULAR; INTRAVENOUS at 11:24

## 2020-05-15 RX ADMIN — SODIUM CHLORIDE, SODIUM LACTATE, POTASSIUM CHLORIDE, AND CALCIUM CHLORIDE 25 ML/HR: 600; 310; 30; 20 INJECTION, SOLUTION INTRAVENOUS at 10:04

## 2020-05-15 RX ADMIN — MIDAZOLAM HYDROCHLORIDE 2 MG: 1 INJECTION, SOLUTION INTRAMUSCULAR; INTRAVENOUS at 11:16

## 2020-05-15 RX ADMIN — Medication 3 MG: at 11:58

## 2020-05-15 RX ADMIN — FENTANYL CITRATE 50 MCG: 50 INJECTION, SOLUTION INTRAMUSCULAR; INTRAVENOUS at 11:21

## 2020-05-15 RX ADMIN — Medication 15 MG: at 11:32

## 2020-05-15 RX ADMIN — Medication 80 MCG: at 11:30

## 2020-05-15 RX ADMIN — ACETAMINOPHEN 650 MG: 325 TABLET, FILM COATED ORAL at 10:04

## 2020-05-15 RX ADMIN — SODIUM CHLORIDE, POTASSIUM CHLORIDE, SODIUM LACTATE AND CALCIUM CHLORIDE: 600; 310; 30; 20 INJECTION, SOLUTION INTRAVENOUS at 11:00

## 2020-05-15 RX ADMIN — PROPOFOL 50 MG: 10 INJECTION, EMULSION INTRAVENOUS at 11:50

## 2020-05-15 RX ADMIN — GLYCOPYRROLATE 0.4 MG: 0.2 INJECTION, SOLUTION INTRAMUSCULAR; INTRAVENOUS at 11:58

## 2020-05-15 RX ADMIN — DEXAMETHASONE SODIUM PHOSPHATE 4 MG: 4 INJECTION, SOLUTION INTRAMUSCULAR; INTRAVENOUS at 11:36

## 2020-05-15 RX ADMIN — ROCURONIUM BROMIDE 30 MG: 10 INJECTION INTRAVENOUS at 11:30

## 2020-05-15 RX ADMIN — LIDOCAINE HYDROCHLORIDE 60 MG: 20 INJECTION, SOLUTION EPIDURAL; INFILTRATION; INTRACAUDAL; PERINEURAL at 11:21

## 2020-05-15 RX ADMIN — Medication 80 MCG: at 11:34

## 2020-05-15 RX ADMIN — ONDANSETRON HYDROCHLORIDE 4 MG: 2 INJECTION, SOLUTION INTRAMUSCULAR; INTRAVENOUS at 11:36

## 2020-05-15 NOTE — H&P
Gastroenterology Outpatient History and Physical    Patient: Linda Small    Physician: Debbe Duane, MD    Chief Complaint: Rosado's esophagus and CBD stones  History of Present Illness: 68yo F with Rosado's esophagus and CBD stones    History:  Past Medical History:   Diagnosis Date    Anemia NEC     Arthritis     Asthma     CAD (coronary artery disease)     NSTEMI following PCI    [facility]culus of kidney 2001    [facility]    Cancer ([facility] [address]     skin    Chronic pain     degenerative disc disease, lower right back    COPD     Depression     Diabetes ([address]     GERD (gastroesophageal reflux disease)     Hx of mammogram 2017    [address] [facility] - No mammographic evidence of malignancy - annual follow up recommended     Hypertension     2005 Dx    Joint pain     Morbid obesity ([address]     Psychiatric disorder     depression    Rheumatic fever     as a child    Sleep apnea     on CPAP    Sleep disorder       Past Surgical History:   Procedure Laterality Date    CARDIAC SURG PROCEDURE UNLIST      4 stents    COLONOSCOPY N/A 2019    COLONOSCOPY performed by Geovanna Abraham MD at [address] [place] [address]  2019         HX APPENDECTOMY      in     HX CHOLECYSTECTOMY      with stent placement    HX CORONARY STENT PLACEMENT  dec 2013    HX HYSTERECTOMY      HX LITHOTRIPSY      HX TRABECULECTOMY      HX TUBAL LIGATION      UPPER GI ENDOSCOPY,BIOPSY  2019           Social History     Socioeconomic History    Marital status:      Spouse name: Not on file    Number of children: Not on file    Years of education: Not on file    Highest education level: Not on file   Tobacco Use    Smoking status: Former Smoker     Packs/day: 1.00     Years: 45.00     Pack years: 45.00     Last attempt to quit: 2013     Years since quittin.3    Smokeless tobacco: Never Used   Substance and Sexual Activity    Alcohol use: No    Drug use: No    Sexual activity: Never      Family History   Problem Relation Age of Onset   Thalia Iglesias Arthritis-rheumatoid Sister     Osteoporosis Sister     Diabetes Brother     Stroke Brother     Elevated Lipids Mother     Alcohol abuse Father     Elevated Lipids Father     Cancer Sister     Diabetes Sister     Cancer Sister     Diabetes Brother     Diabetes Brother     Heart Disease Brother     Diabetes Brother     Diabetes Brother     Heart Disease Brother     Diabetes Maternal Aunt       Patient Active Problem List   Diagnosis Code    DM (diabetes mellitus) ([facility] [address] E11.9    Hyperlipidemia E78.5    HTN (hypertension) I10    Chronic low back pain M54.5, G89.29    Depression F32.9    Vitamin D deficiency E55.9    COPD exacerbation (HCC) J44.1    Dizziness and giddiness R42    Dilation of common bile duct K83.8       Allergies: Allergies   Allergen Reactions    Codeine Shortness of Breath    Crestor [Rosuvastatin] Other (comments)     Leg pains and could not walk    Lipitor [Atorvastatin] Myalgia    Tetanus Vaccines And Toxoid Swelling     Medications:   Prior to Admission medications    Medication Sig Start Date End Date Taking? Authorizing Provider   sucralfate (CARAFATE) 1 gram tablet Take 1 g by mouth four (4) times daily. Yes Provider, Historical   azelastine (ASTEPRO) 0.15 % (205.5 mcg) by Both Nostrils route daily. Yes Provider, Historical   albuterol (PROVENTIL VENTOLIN) 2.5 mg /3 mL (0.083 %) nebu 3 mL by Nebulization route four (4) times daily. 11/25/19  Yes Olivia Esquivel MD   glycopyrrolate-formoterol (BEVESPI AEROSPHERE) 9-4.8 mcg HFAA Take 2 Puffs by inhalation two (2) times a day. 11/25/19  Yes Olivia Esquivel MD   pravastatin (PRAVACHOL) 40 mg tablet Take 1 Tab by mouth nightly. Patient taking differently: Take 80 mg by mouth nightly. 11/25/19  Yes Olivia Esquivel MD   fenofibrate (LOFIBRA) 160 mg tablet Take 1 Tab by mouth daily.  11/25/19  Yes Olivia Esquivel MD   magnesium oxide (MAG-OX) 400 mg tablet Take 1 Tab by mouth two (2) times a day. Patient taking differently: Take 400 mg by mouth as needed. 11/25/19  Yes Olivia Esquivel MD   omeprazole (PRILOSEC) 20 mg capsule Take 1 Cap by mouth nightly. 11/25/19  Yes Olivia Esquivel MD   senna-docusate (PERICOLACE) 8.6-50 mg per tablet Take 2 Tabs by mouth two (2) times a day. Patient taking differently: Take 2 Tabs by mouth as needed. 11/25/19  Yes Olivia Esquivel MD   azithromycin [facility]) 250 mg tablet Take 1 Tab by mouth every Monday, Wednesday, Friday. 11/25/19  Yes Olivia Esquivel MD   LANTUS SOLOSTAR U-100 INSULIN 100 unit/mL (3 mL) inpn 25 Units by SubCUTAneous route daily. Patient taking differently: 10 Units by SubCUTAneous route daily. 11/25/19  Yes Olivia Esquivel MD   ezetimibe (ZETIA) 10 mg tablet Take 10 mg by mouth nightly. 4/25/18  Yes Provider, Historical   glimepiride (AMARYL) 2 mg tablet 2 mg two (2) times a day. 4/14/18  Yes Provider, Historical   ACCU-CHEK SHELIA PLUS TEST STRP strip TEST BLOOD SUAGR TWICE A DAY 3/24/18  Yes Provider, Historical   lisinopril (PRINIVIL, ZESTRIL) 10 mg tablet Take 0.5 Tabs by mouth daily. 11/20/16  Yes Arabella Ga MD   DULoxetine (CYMBALTA) 60 mg capsule Take 1 capsule by mouth  every day 11/25/15  Yes Tamanna Crockett MD   aspirin 81 mg chewable tablet Take 1 Tab by mouth daily. 12/20/13  Yes Kapil Falcon MD   carvedilol (COREG) 6.25 mg tablet Take 1 Tab by mouth two (2) times daily (with meals). 12/20/13  Yes Kapil Falcon MD   albuterol (PROVENTIL HFA) 90 mcg/Actuation inhaler Take 2 Puffs by inhalation every four (4) hours as needed. Indications: BRONCHOSPASM PREVENTION 6/8/11  Yes Provider, Historical   omega-3 fatty acids-vitamin e (FISH OIL) 1,000 mg Cap Take 1 Cap by mouth daily. cholesterol 8/28/10  Yes Provider, Historical   ondansetron (Zofran ODT) 4 mg disintegrating tablet Take 4 mg by mouth every eight (8) hours as needed for Nausea or Vomiting.     Provider, Historical acetaminophen (TYLENOL) 500 mg tablet Take 1 Tab by mouth every four (4) hours as needed for Pain or Fever. 11/25/19   Yaritza Johnson MD     Physical Exam:   Vital Signs: Blood pressure (!) 149/104, pulse 99, temperature 98 °F (36.7 °C), resp. rate 21, height 5' 5.4\" (1.661 m), weight 85.2 kg (187 lb 13.3 oz), SpO2 99 %.   General: well developed, well nourished   HEENT: unremarkable   Heart: regular rhythm no mumur    Lungs: clear   Abdominal:  benign   Neurological: unremarkable   Extremities: no edema     Findings/Diagnosis: Rosado's esophagus and CBD stones  Plan of Care/Planned Procedure: EGD with bx and ERCP with conscious/deep sedation    Signed:  Vic Sharp MD 5/15/2020

## 2020-05-15 NOTE — PROCEDURES
NAME:  Salma Franz   :   1940   MRN:   [id number]     [facility]  Date/Time:  5/15/2020 12:12 PM    Procedure Type:   ERCP with biliary sphincterotomy, biliary stone removal, biliary stent removal ; EGD with biopsy    Indications: common bile duct stone, Rosado's esophagus  Pre-operative Diagnosis: see indication above  Post-operative Diagnosis:  See findings below  : Hans Forrest MD  Referring Provider:     Rebecca Baig MD    Exam:  Airway: clear, no airway problems anticipated  Heart: RRR, without gallops or rubs  Lungs: clear bilaterally without wheezes, crackles, or rhonchi  Abdomen: soft, nontender, nondistended, bowel sounds present  Mental Status: awake, alert and oriented to person, place and time    Sedation:  General anesthesia  Procedure Details:  After informed consent was obtained with all risks and benefits of procedure explained, the patient was taken to the fluoroscopy suite and placed in the supine position. Upon sequential sedation as per above, the Olympus upper endoscope GIF-180 was inserted and followed later by the Olympus duodenoscope [id number]   was inserted via the mouthpeice and carefully advanced to the second portion of the duodenum. The quality of visualization was excellent. The duodenoscope was withdrawn into a short position. Findings:   Endoscopic:   -Normal esophageal mucosa; biopsied to exclude Rosado's esophagus  -Normal stomach  -Normal duodenum  Ampulla:previous sphincterotomy  protruding stent  Cholangiogram: -Persistent dilation to 12mm with distal filling defect consistent with bile duct stone  Pancreatogram:not performed    Specimen Removed:  #1 distal esophagus/g-e junction  Complications: None. EBL:  None. Interventions:    Pancreatic: none  Biliary: After biopsies are obtained of  the distal esophagus, the previously placed stent, which is encrusted in thick sludge and debris, is removed with snare device.   Prompt wire guided cannulation is achieved with Dreamtome sphincterotome with position confirmed fluoroscopically and with bile aspirate. Contrast is injected demonstrating persistent dilatation of the common bile duct to 12mm with distal debris and filling defect felt consistent with stone. The previous sphincterotomy is extended without bleeding. Exchange is performed over wire for the 9-12mm biliary balloon which extracts some sludge and debris. There remained a persistent filling defect in the distal common bile duct consistent with a cuboid stone. Exchange for a 12-15mm biliary extraction balloon is performed  and the last stone is successfuly removed from the bile duct with the 15mm balloon noted to pass through the sphincterotomy. The entire endoscopic apparatus is removed and the patient is noted to tolerate the procedure well. Impression:    -Normal esophageal mucosa; biopsied to exclude Rosado's esophagus  -Normal stomach  -Normal duodenum  -The previously placed stent, which is encrusted in thick sludge and debris, is removed with snare device. Prompt wire guided cannulation is achieved with Dreamtome sphincterotome with position confirmed -Contrast is injected demonstrating persistent dilatation of the common bile duct to 12mm with distal debris and filling defect felt consistent with stone, prompting extension of the previous sphincterotomy  -A 9-12mm biliary balloon is used to extract some sludge and debris. -A persistent filling defect in the distal common bile duct consistent with a cuboid stone is successfuly removed from the bile duct with a 12-15mm balloon, that is noted to pass through the sphincterotomy. Recommendations:    -Clear liquid diet today and resume regular diet tomorrow as tolerated. -Resume normal medication(s).   -NO aspirin/NSAIDs for 10 days   -No follow-up needed; my offie will contact you regarding biopsy results       Discharge Disposition:  Home following recovery in PACU    Nellie MD Nancy

## 2020-05-15 NOTE — DISCHARGE INSTRUCTIONS
Ken Tali  [id number]  1940    ERCP DISCHARGE INSTRUCTIONS  Discomfort:  Sore throat- throat lozenges or warm salt water gargle  redness at IV site- apply warm compress to area; if redness or soreness persist- contact your physician  Gaseous discomfort- walking, belching will help relieve any discomfort  You may not operate a vehicle for 12 hours  You may not engage in an occupation involving machinery or appliances for rest of today  You may not drink alcoholic beverages for at least 12 hours  Avoid making any critical decisions for at least 24 hour  DIET  You may have minimal sips at this time-- do not eat or drink for two hours. You may eat and drink clear liquids the remainder of the day after 3pm today  You may resume your regular diet tomorrow- however -  remember your colon is empty and a heavy meal will produce gas. Avoid these foods:  vegetables, fried / greasy foods, carbonated drinks    MEDICATIONS:        ACTIVITY  You may resume your normal daily activities until tomorrow AM;  Spend the remainder of the day resting -  avoid any strenuous activity. CALL M.D. ANY SIGN OF   Increasing pain, nausea, vomiting  Abdominal distension (swelling)  New increased bleeding (oral or rectal)  Fever (chills)  Pain in chest area  Bloody discharge from nose or mouth  Shortness of breath    You may not take any Advil, Aspirin, Ibuprofen, Motrin, Aleve, or Goodys for 10 days, ONLY  Tylenol as needed for pain. IMPRESSION:  -Normal esophageal mucosa; biopsied to exclude Rosado's esophagus  -Normal stomach  -Normal duodenum  -The previously placed stent, which is encrusted in thick sludge and debris, is removed with snare device.   Prompt wire guided cannulation is achieved with Dreamtome sphincterotome with position confirmed -Contrast is injected demonstrating persistent dilatation of the common bile duct to 12mm with distal debris and filling defect felt consistent with stone, prompting extension of the previous sphincterotomy  -A 9-12mm biliary balloon is used to extract some sludge and debris. -A persistent filling defect in the distal common bile duct consistent with a cuboid stone is successfuly removed from the bile duct with a 12-15mm balloon, that is noted to pass through the sphincterotomy.      -Clear liquid diet today and resume regular diet tomorrow as tolerated. -Resume normal medication(s). -NO aspirin/NSAIDs for 10 days   -No follow-up needed; my offie will contact you regarding biopsy results     Follow-up Instructions:  -Call Dr. Maddie Welch for the results of procedure / biopsy in 7-10 days  -Telephone # [phone]  -Clear liquid diet today and resume regular diet tomorrow as tolerated. -Resume normal medication(s).   -NO aspirin/NSAIDs for 10 days   -No follow-up needed; my office will contact you regarding biopsy results   -Repeat upper endoscopy in 5 years    Radha Alex MD

## 2020-05-15 NOTE — PROGRESS NOTES
0900-Pt has not arrived. Called pt, pt was being admitted at the wrong location. On her way here now.  Will notify team.

## 2020-05-15 NOTE — ANESTHESIA PREPROCEDURE EVALUATION
Anesthetic History   No history of anesthetic complications            Review of Systems / Medical History  Patient summary reviewed, nursing notes reviewed and pertinent labs reviewed    Pulmonary    COPD: severe        Asthma     Comments: O2 2L NC   FEV 1 1.18   Neuro/Psych         Psychiatric history     Cardiovascular    Hypertension          CAD and cardiac stents    Exercise tolerance: <4 METS     GI/Hepatic/Renal     GERD    Renal disease: CRI and stones       Endo/Other    Diabetes: poorly controlled, type 2    Morbid obesity and arthritis     Other Findings            Physical Exam    Airway  Mallampati: III  TM Distance: 4 - 6 cm  Neck ROM: normal range of motion   Mouth opening: Normal     Cardiovascular    Rhythm: regular  Rate: normal         Dental    Dentition: Caps/crowns, Poor dentition and Bridges     Pulmonary  Breath sounds clear to auscultation      :bilateral         Abdominal  Abdominal exam normal       Other Findings   Comments: On O2         Anesthetic Plan    ASA: 3  Anesthesia type: general          Induction: Intravenous  Anesthetic plan and risks discussed with: Patient

## 2020-05-15 NOTE — ANESTHESIA POSTPROCEDURE EVALUATION
Post-Anesthesia Evaluation and Assessment    Patient: Shayy Hicks MRN: [id number]  SSN: [id number]    YOB: 1940  Age: 70 y.o. Sex: female       Cardiovascular Function/Vital Signs  Visit Vitals  /87   Pulse 79   Temp 36.3 °C (97.4 °F)   Resp 17   Ht 5' 5.4\" (1.661 m)   Wt 85.2 kg (187 lb 13.3 oz)   SpO2 98%   BMI 30.88 kg/m²       Patient is status post General anesthesia for Procedure(s):  ENDOSCOPIC RETROGRADE CHOLANGIOPANCREATOGRAPHY (ERCP) AND EGD, BIOPSY, SPINCTEROTOMY, BALLOON SWEEP STONE EXRACTION AND STENT REMOVAL. Nausea/Vomiting: None    Postoperative hydration reviewed and adequate. Pain:  Pain Scale 1: Numeric (0 - 10) (05/15/20 1245)  Pain Intensity 1: 0 (05/15/20 1245)   Managed    Neurological Status:   Neuro (WDL): Exceptions to WDL (05/15/20 1227)  Neuro  Neurologic State: Drowsy; Eyes open spontaneously (05/15/20 1227)  Orientation Level: Oriented to person;Disoriented to place; Disoriented to situation;Disoriented to time (05/15/20 1227)  Cognition: Follows commands;Poor safety awareness (05/15/20 1227)  Speech: Clear;Delayed responses (05/15/20 1227)  LUE Motor Response: Purposeful;Spontaneous  (05/15/20 1227)  LLE Motor Response: Purposeful;Spontaneous  (05/15/20 1227)  RUE Motor Response: Purposeful;Spontaneous  (05/15/20 1227)  RLE Motor Response: Purposeful;Spontaneous  (05/15/20 1227)   At baseline    Mental Status and Level of Consciousness: Alert and oriented to person, place, and time    Pulmonary Status:   O2 Device: Nasal cannula (05/15/20 1245)   Adequate oxygenation and airway patent    Complications related to anesthesia: None    Post-anesthesia assessment completed. No concerns    Signed By: Jennifer Alvarenga MD     May 15, 2020              Procedure(s):  ENDOSCOPIC RETROGRADE CHOLANGIOPANCREATOGRAPHY (ERCP) AND EGD, BIOPSY, SPINCTEROTOMY, BALLOON SWEEP STONE EXRACTION AND STENT REMOVAL.     general    <BSHSIANPOST>    Vitals Value Taken Time   /87 5/15/2020 12:45 PM   Temp 36.3 °C (97.4 °F) 5/15/2020 12:27 PM   Pulse 86 5/15/2020 12:52 PM   Resp 12 5/15/2020 12:52 PM   SpO2 98 % 5/15/2020 12:52 PM   Vitals shown include unvalidated device data.

## 2020-05-15 NOTE — PERIOP NOTES
2876 -PT'S COVID TEST UNAVAILABLE AT THIS TIME - DR. ZHU AWARE AND REQUESTS TO PRECEDE WITH PROCEDURE. PT [address]. PT DENIES S/S OF COVID - NO FEVER, NO N/V AND NO DIARRHEA. PT DOES HAVE SOB ON EXERTION AS PER BASELINE AND WEARS O2 3LNC OTC.      0984 - DR. TOLEDO AWARE OF  - NO TREATMENT AT THIS TIME.

## 2020-07-15 ENCOUNTER — HOSPITAL ENCOUNTER (OUTPATIENT)
Dept: NUCLEAR MEDICINE | Age: 80
Discharge: HOME | End: 2020-07-15
Attending: INTERNAL MEDICINE
Payer: MEDICARE

## 2020-07-15 ENCOUNTER — HOSPITAL ENCOUNTER (OUTPATIENT)
Dept: ULTRASOUND IMAGING | Age: 80
Discharge: HOME | End: 2020-07-15
Attending: INTERNAL MEDICINE
Payer: MEDICARE

## 2020-07-15 DIAGNOSIS — E21.3 HYPERPARATHYROIDISM (HCC): ICD-10-CM

## 2020-07-15 PROCEDURE — 76536 US EXAM OF HEAD AND NECK: CPT

## 2020-07-15 PROCEDURE — 78070 PARATHYROID PLANAR IMAGING: CPT

## 2020-12-14 ENCOUNTER — VIRTUAL VISIT (OUTPATIENT)
Dept: SLEEP MEDICINE | Age: 80
End: 2020-12-14
Payer: MEDICARE

## 2020-12-14 DIAGNOSIS — I10 ESSENTIAL HYPERTENSION: ICD-10-CM

## 2020-12-14 DIAGNOSIS — G47.33 OSA (OBSTRUCTIVE SLEEP APNEA): Primary | ICD-10-CM

## 2020-12-14 DIAGNOSIS — U07.1 COVID-19: ICD-10-CM

## 2020-12-14 DIAGNOSIS — Z86.59 H/O: DEPRESSION: ICD-10-CM

## 2020-12-14 DIAGNOSIS — J44.9 CHRONIC OBSTRUCTIVE PULMONARY DISEASE, UNSPECIFIED COPD TYPE (HCC): ICD-10-CM

## 2020-12-14 PROCEDURE — G9717 DOC PT DX DEP/BP F/U NT REQ: HCPCS | Performed by: INTERNAL MEDICINE

## 2020-12-14 PROCEDURE — G8536 NO DOC ELDER MAL SCRN: HCPCS | Performed by: INTERNAL MEDICINE

## 2020-12-14 PROCEDURE — G8427 DOCREV CUR MEDS BY ELIG CLIN: HCPCS | Performed by: INTERNAL MEDICINE

## 2020-12-14 PROCEDURE — G8756 NO BP MEASURE DOC: HCPCS | Performed by: INTERNAL MEDICINE

## 2020-12-14 PROCEDURE — 1090F PRES/ABSN URINE INCON ASSESS: CPT | Performed by: INTERNAL MEDICINE

## 2020-12-14 PROCEDURE — 99214 OFFICE O/P EST MOD 30 MIN: CPT | Performed by: INTERNAL MEDICINE

## 2020-12-14 PROCEDURE — G8419 CALC BMI OUT NRM PARAM NOF/U: HCPCS | Performed by: INTERNAL MEDICINE

## 2020-12-14 PROCEDURE — G8399 PT W/DXA RESULTS DOCUMENT: HCPCS | Performed by: INTERNAL MEDICINE

## 2020-12-14 PROCEDURE — 1101F PT FALLS ASSESS-DOCD LE1/YR: CPT | Performed by: INTERNAL MEDICINE

## 2020-12-14 NOTE — PATIENT INSTRUCTIONS
217 [facility]., [place]. 1668 [facility], [address] Tel.  [phone] Fax. 100 [facility] 60 1001 [facility], [address] Tel.  [phone] Fax. [phone] 9250 [address] Tel.  [phone] Fax. [phone] Learning About CPAP for Sleep Apnea What is CPAP? CPAP is a small machine that you use at home every night while you sleep. It increases air pressure in your throat to keep your airway open. When you have sleep apnea, this can help you sleep better so you feel much better. CPAP stands for \"continuous positive airway pressure. \" The CPAP machine will have one of the following: · A mask that covers your nose and mouth · Prongs that fit into your nose · A mask that covers your nose only, the most common type. This type is called NCPAP. The N stands for \"nasal.\" Why is it done? CPAP is usually the best treatment for obstructive sleep apnea. It is the first treatment choice and the most widely used. Your doctor may suggest CPAP if you have: · Moderate to severe sleep apnea. · Sleep apnea and coronary artery disease (CAD) or heart failure. How does it help? · CPAP can help you have more normal sleep, so you feel less sleepy and more alert during the daytime. · CPAP may help keep heart failure or other heart problems from getting worse. · NCPAP may help lower your blood pressure. · If you use CPAP, your bed partner may also sleep better because you are not snoring or restless. What are the side effects? Some people who use CPAP have: · A dry or stuffy nose and a sore throat. · Irritated skin on the face. · Sore eyes. · Bloating. If you have any of these problems, work with your doctor to fix them. Here are some things you can try: · Be sure the mask or nasal prongs fit well. · See if your doctor can adjust the pressure of your CPAP. · If your nose is dry, try a humidifier. · If your nose is runny or stuffy, try decongestant medicine or a steroid nasal spray. If these things do not help, you might try a different type of machine. Some machines have air pressure that adjusts on its own. Others have air pressures that are different when you breathe in than when you breathe out. This may reduce discomfort caused by too much pressure in your nose. Where can you learn more? Go to BeachMint.be Enter X131 in the search box to learn more about \"Learning About CPAP for Sleep Apnea. \"  
© 5807-1816 Healthwise, Incorporated. Care instructions adapted under license by [facility] (which disclaims liability or warranty for this information). This care instruction is for use with your licensed healthcare professional. If you have questions about a medical condition or this instruction, always ask your healthcare professional. [address] any warranty or liability for your use of this information. Content Version: 2.9.72530; Last Revised: January 11, 2010 PROPER SLEEP HYGIENE What to avoid · Do not have drinks with caffeine, such as coffee or black tea, for 8 hours before bed. · Do not smoke or use other types of tobacco near bedtime. Nicotine is a stimulant and can keep you awake. · Avoid drinking alcohol late in the evening, because it can cause you to wake in the middle of the night. · Do not eat a big meal close to bedtime. If you are hungry, eat a light snack. · Do not drink a lot of water close to bedtime, because the need to urinate may wake you up during the night. · Do not read or watch TV in bed. Use the bed only for sleeping and sexual activity. What to try · Go to bed at the same time every night, and wake up at the same time every morning. Do not take naps during the day. · Keep your bedroom quiet, dark, and cool. · Get regular exercise, but not within 3 to 4 hours of your bedtime. Greg [place] · Sleep on a comfortable pillow and mattress. · If watching the clock makes you anxious, turn it facing away from you so you cannot see the time. · If you worry when you lie down, start a worry book. Well before bedtime, write down your worries, and then set the book and your concerns aside. · Try meditation or other relaxation techniques before you go to bed. · If you cannot fall asleep, get up and go to another room until you feel sleepy. Do something relaxing. Repeat your bedtime routine before you go to bed again. · Make your house quiet and calm about an hour before bedtime. Turn down the lights, turn off the TV, log off the computer, and turn down the volume on music. This can help you relax after a busy day. Drowsy Driving: The [employer] cites drowsiness as a causing factor in more than 882,699 police reported crashes annually, resulting in 76,000 injuries and 1,500 deaths. Other surveys suggest 55% of people polled have driven while drowsy in the past year, 23% had fallen asleep but not crashed, 3% crashed, and 2% had and accident due to drowsy driving. Who is at risk? Young Drivers: One study of drowsy driving accidents states that 55% of the drivers were under 25 years. Of those, 75% were male. Shift Workers and Travelers: People who work overnight or travel across time zones frequently are at higher risk of experiencing Circadian Rhythm Disorders. They are trying to work and function when their body is programed to sleep. Sleep Deprived: Lack of sleep has a serious impact on your ability to pay attention or focus on a task. Consistently getting less than the average of 8 hours your body needs creates partial or cumulative sleep deprivation.   
Untreated Sleep Disorders: Sleep Apnea, Narcolepsy, R.L.S., and other sleep disorders (untreated) prevent a person from getting enough restful sleep. This leads to excessive daytime sleepiness and increases the risk for drowsy driving accidents by up to 7 times. Medications / Alcohol: Even over the counter medications can cause drowsiness. Medications that impair a drivers attention should have a warning label. Alcohol naturally makes you sleepy and on its own can cause accidents. Combined with excessive drowsiness its effects are amplified. Signs of Drowsy Driving: * You don't remember driving the last few miles * You may drift out of your boyd * You are unable to focus and your thoughts wander * You may yawn more often than normal 
 * You have difficulty keeping your eyes open / nodding off * Missing traffic signs, speeding, or tailgating Prevention-  
Good sleep hygiene, lifestyle and behavioral choices have the most impact on drowsy driving. There is no substitute for sleep and the average person requires 8 hours nightly. If you find yourself driving drowsy, stop and sleep. Consider the sleep hygiene tips provided during your visit as well. Medication Refill Policy: Refills for all medications require 1 week advance notice. Please have your pharmacy fax a refill request. We are unable to fax, or call in \"controled substance\" medications and you will need to pick these prescriptions up from our office. [employer] Activation Thank you for requesting access to [employer]. Please follow the instructions below to securely access and download your online medical record. [employer] allows you to send messages to your doctor, view your test results, renew your prescriptions, schedule appointments, and more. How Do I Sign Up? 1. In your internet browser, go to [email or web address]. 2. Click on the First Time User? Click Here link in the Sign In box. You will see the New Member Sign Up page. 3. Enter your [employer] Access Code exactly as it appears below.  You will not need to use this code after youve completed the sign-up process. If you do not sign up before the expiration date, you must request a new code. [employer] Access Code: [email or web address] Expires: 2021  9:27 AM (This is the date your [employer] access code will ) 4. Enter the last four digits of your Social Security Number (xxxx) and Date of Birth (mm/dd/yyyy) as indicated and click Submit. You will be taken to the next sign-up page. 5. Create a [employer] ID. This will be your [employer] login ID and cannot be changed, so think of one that is secure and easy to remember. 6. Create a [employer] password. You can change your password at any time. 7. Enter your Password Reset Question and Answer. This can be used at a later time if you forget your password. 8. Enter your e-mail address. You will receive e-mail notification when new information is available in [address]. 9. Click Sign Up. You can now view and download portions of your medical record. 10. Click the Download Summary menu link to download a portable copy of your medical information. Additional Information If you have questions, please call [phone]. Remember, [employer] is NOT to be used for urgent needs. For medical emergencies, dial 911.

## 2020-12-15 ENCOUNTER — DOCUMENTATION ONLY (OUTPATIENT)
Dept: SLEEP MEDICINE | Age: 80
End: 2020-12-15

## 2021-03-04 ENCOUNTER — DOCUMENTATION ONLY (OUTPATIENT)
Dept: SLEEP MEDICINE | Age: 81
End: 2021-03-04

## 2021-03-04 NOTE — PROGRESS NOTES
Follow-up call to reschedule titration study. Patient still concerned about exposure to COVID as family member has tested positive for the virus presently. Will check back with her in about a month.

## 2021-04-20 ENCOUNTER — APPOINTMENT (OUTPATIENT)
Dept: GENERAL RADIOLOGY | Age: 81
DRG: 286 | End: 2021-04-20
Attending: EMERGENCY MEDICINE
Payer: MEDICARE

## 2021-04-20 ENCOUNTER — HOSPITAL ENCOUNTER (INPATIENT)
Age: 81
LOS: 6 days | Discharge: HOME HEALTH | DRG: 286 | End: 2021-04-26
Attending: EMERGENCY MEDICINE | Admitting: FAMILY MEDICINE
Payer: MEDICARE

## 2021-04-20 ENCOUNTER — APPOINTMENT (OUTPATIENT)
Dept: ULTRASOUND IMAGING | Age: 81
DRG: 286 | End: 2021-04-20
Attending: FAMILY MEDICINE
Payer: MEDICARE

## 2021-04-20 DIAGNOSIS — R07.9 ACUTE CHEST PAIN: Primary | ICD-10-CM

## 2021-04-20 DIAGNOSIS — I20.0 UNSTABLE ANGINA (HCC): ICD-10-CM

## 2021-04-20 DIAGNOSIS — J40 BRONCHITIS: ICD-10-CM

## 2021-04-20 DIAGNOSIS — E08.10 DIABETES MELLITUS DUE TO UNDERLYING CONDITION WITH KETOACIDOSIS WITHOUT COMA, WITHOUT LONG-TERM CURRENT USE OF INSULIN (HCC): ICD-10-CM

## 2021-04-20 LAB
ALBUMIN SERPL-MCNC: 3.7 G/DL (ref 3.5–5)
ALBUMIN/GLOB SERPL: 1.4 {RATIO} (ref 1.1–2.2)
ALP SERPL-CCNC: 76 U/L (ref 45–117)
ALT SERPL-CCNC: 22 U/L (ref 12–78)
ANION GAP SERPL CALC-SCNC: 4 MMOL/L (ref 5–15)
AST SERPL-CCNC: 12 U/L (ref 15–37)
ATRIAL RATE: 82 BPM
BASOPHILS # BLD: 0 K/UL (ref 0–0.1)
BASOPHILS NFR BLD: 0 % (ref 0–1)
BILIRUB SERPL-MCNC: 0.5 MG/DL (ref 0.2–1)
BUN SERPL-MCNC: 21 MG/DL (ref 6–20)
BUN/CREAT SERPL: 16 (ref 12–20)
CALCIUM SERPL-MCNC: 9.9 MG/DL (ref 8.5–10.1)
CALCULATED P AXIS, ECG09: 72 DEGREES
CALCULATED R AXIS, ECG10: 48 DEGREES
CALCULATED T AXIS, ECG11: 91 DEGREES
CHLORIDE SERPL-SCNC: 105 MMOL/L (ref 97–108)
CO2 SERPL-SCNC: 29 MMOL/L (ref 21–32)
COMMENT, HOLDF: NORMAL
CREAT SERPL-MCNC: 1.32 MG/DL (ref 0.55–1.02)
DIAGNOSIS, 93000: NORMAL
DIFFERENTIAL METHOD BLD: ABNORMAL
EOSINOPHIL # BLD: 0.1 K/UL (ref 0–0.4)
EOSINOPHIL NFR BLD: 1 % (ref 0–7)
ERYTHROCYTE [DISTWIDTH] IN BLOOD BY AUTOMATED COUNT: 13.4 % (ref 11.5–14.5)
EST. AVERAGE GLUCOSE BLD GHB EST-MCNC: 200 MG/DL
GLOBULIN SER CALC-MCNC: 2.7 G/DL (ref 2–4)
GLUCOSE BLD STRIP.AUTO-MCNC: 180 MG/DL (ref 65–100)
GLUCOSE SERPL-MCNC: 322 MG/DL (ref 65–100)
HBA1C MFR BLD: 8.6 % (ref 4–5.6)
HCT VFR BLD AUTO: 39.3 % (ref 35–47)
HGB BLD-MCNC: 13.3 G/DL (ref 11.5–16)
IMM GRANULOCYTES # BLD AUTO: 0 K/UL (ref 0–0.04)
IMM GRANULOCYTES NFR BLD AUTO: 1 % (ref 0–0.5)
LYMPHOCYTES # BLD: 1.2 K/UL (ref 0.8–3.5)
LYMPHOCYTES NFR BLD: 23 % (ref 12–49)
MCH RBC QN AUTO: 30.7 PG (ref 26–34)
MCHC RBC AUTO-ENTMCNC: 33.8 G/DL (ref 30–36.5)
MCV RBC AUTO: 90.8 FL (ref 80–99)
MONOCYTES # BLD: 0.7 K/UL (ref 0–1)
MONOCYTES NFR BLD: 12 % (ref 5–13)
NEUTS SEG # BLD: 3.3 K/UL (ref 1.8–8)
NEUTS SEG NFR BLD: 63 % (ref 32–75)
NRBC # BLD: 0 K/UL (ref 0–0.01)
NRBC BLD-RTO: 0 PER 100 WBC
P-R INTERVAL, ECG05: 124 MS
PLATELET # BLD AUTO: 154 K/UL (ref 150–400)
PMV BLD AUTO: 10.1 FL (ref 8.9–12.9)
POTASSIUM SERPL-SCNC: 4 MMOL/L (ref 3.5–5.1)
PROT SERPL-MCNC: 6.4 G/DL (ref 6.4–8.2)
Q-T INTERVAL, ECG07: 344 MS
QRS DURATION, ECG06: 78 MS
QTC CALCULATION (BEZET), ECG08: 401 MS
RBC # BLD AUTO: 4.33 M/UL (ref 3.8–5.2)
SAMPLES BEING HELD,HOLD: NORMAL
SERVICE CMNT-IMP: ABNORMAL
SODIUM SERPL-SCNC: 138 MMOL/L (ref 136–145)
TROPONIN I SERPL-MCNC: <0.05 NG/ML
TROPONIN I SERPL-MCNC: <0.05 NG/ML
TSH SERPL DL<=0.05 MIU/L-ACNC: 2.53 UIU/ML (ref 0.36–3.74)
VENTRICULAR RATE, ECG03: 82 BPM
WBC # BLD AUTO: 5.3 K/UL (ref 3.6–11)

## 2021-04-20 PROCEDURE — 76770 US EXAM ABDO BACK WALL COMP: CPT

## 2021-04-20 PROCEDURE — 84443 ASSAY THYROID STIM HORMONE: CPT

## 2021-04-20 PROCEDURE — 80053 COMPREHEN METABOLIC PANEL: CPT

## 2021-04-20 PROCEDURE — 36415 COLL VENOUS BLD VENIPUNCTURE: CPT

## 2021-04-20 PROCEDURE — 71046 X-RAY EXAM CHEST 2 VIEWS: CPT

## 2021-04-20 PROCEDURE — 85025 COMPLETE CBC W/AUTO DIFF WBC: CPT

## 2021-04-20 PROCEDURE — 83036 HEMOGLOBIN GLYCOSYLATED A1C: CPT

## 2021-04-20 PROCEDURE — 84484 ASSAY OF TROPONIN QUANT: CPT

## 2021-04-20 PROCEDURE — 74011250636 HC RX REV CODE- 250/636: Performed by: FAMILY MEDICINE

## 2021-04-20 PROCEDURE — 82962 GLUCOSE BLOOD TEST: CPT

## 2021-04-20 PROCEDURE — 93005 ELECTROCARDIOGRAM TRACING: CPT

## 2021-04-20 PROCEDURE — 99285 EMERGENCY DEPT VISIT HI MDM: CPT

## 2021-04-20 PROCEDURE — 65660000000 HC RM CCU STEPDOWN

## 2021-04-20 PROCEDURE — 74011250637 HC RX REV CODE- 250/637: Performed by: FAMILY MEDICINE

## 2021-04-20 RX ORDER — CARVEDILOL 6.25 MG/1
6.25 TABLET ORAL 2 TIMES DAILY WITH MEALS
Status: DISCONTINUED | OUTPATIENT
Start: 2021-04-20 | End: 2021-04-22

## 2021-04-20 RX ORDER — INSULIN LISPRO 100 [IU]/ML
INJECTION, SOLUTION INTRAVENOUS; SUBCUTANEOUS
Status: DISCONTINUED | OUTPATIENT
Start: 2021-04-20 | End: 2021-04-26 | Stop reason: HOSPADM

## 2021-04-20 RX ORDER — IPRATROPIUM BROMIDE AND ALBUTEROL SULFATE 2.5; .5 MG/3ML; MG/3ML
3 SOLUTION RESPIRATORY (INHALATION)
Status: DISCONTINUED | OUTPATIENT
Start: 2021-04-20 | End: 2021-04-22

## 2021-04-20 RX ORDER — MAGNESIUM SULFATE 100 %
4 CRYSTALS MISCELLANEOUS AS NEEDED
Status: DISCONTINUED | OUTPATIENT
Start: 2021-04-20 | End: 2021-04-26 | Stop reason: HOSPADM

## 2021-04-20 RX ORDER — SODIUM CHLORIDE 0.9 % (FLUSH) 0.9 %
5-40 SYRINGE (ML) INJECTION AS NEEDED
Status: DISCONTINUED | OUTPATIENT
Start: 2021-04-20 | End: 2021-04-26 | Stop reason: HOSPADM

## 2021-04-20 RX ORDER — SODIUM CHLORIDE 0.9 % (FLUSH) 0.9 %
5-40 SYRINGE (ML) INJECTION EVERY 8 HOURS
Status: DISCONTINUED | OUTPATIENT
Start: 2021-04-20 | End: 2021-04-26 | Stop reason: HOSPADM

## 2021-04-20 RX ORDER — LISINOPRIL 10 MG/1
5 TABLET ORAL DAILY
Status: DISCONTINUED | OUTPATIENT
Start: 2021-04-21 | End: 2021-04-20

## 2021-04-20 RX ORDER — DEXTROSE 50 % IN WATER (D50W) INTRAVENOUS SYRINGE
25-50 AS NEEDED
Status: DISCONTINUED | OUTPATIENT
Start: 2021-04-20 | End: 2021-04-26 | Stop reason: HOSPADM

## 2021-04-20 RX ORDER — EZETIMIBE 10 MG/1
10 TABLET ORAL
Status: DISCONTINUED | OUTPATIENT
Start: 2021-04-20 | End: 2021-04-26 | Stop reason: HOSPADM

## 2021-04-20 RX ORDER — SUCRALFATE 1 G/1
1 TABLET ORAL 4 TIMES DAILY
Status: DISCONTINUED | OUTPATIENT
Start: 2021-04-20 | End: 2021-04-26 | Stop reason: HOSPADM

## 2021-04-20 RX ORDER — PANTOPRAZOLE SODIUM 40 MG/1
40 TABLET, DELAYED RELEASE ORAL
Status: DISCONTINUED | OUTPATIENT
Start: 2021-04-21 | End: 2021-04-26 | Stop reason: HOSPADM

## 2021-04-20 RX ORDER — HEPARIN SODIUM 5000 [USP'U]/ML
5000 INJECTION, SOLUTION INTRAVENOUS; SUBCUTANEOUS EVERY 8 HOURS
Status: DISCONTINUED | OUTPATIENT
Start: 2021-04-20 | End: 2021-04-20

## 2021-04-20 RX ORDER — NITROGLYCERIN 0.4 MG/1
0.4 TABLET SUBLINGUAL
Status: DISCONTINUED | OUTPATIENT
Start: 2021-04-20 | End: 2021-04-26 | Stop reason: HOSPADM

## 2021-04-20 RX ORDER — DULOXETIN HYDROCHLORIDE 60 MG/1
60 CAPSULE, DELAYED RELEASE ORAL DAILY
Status: DISCONTINUED | OUTPATIENT
Start: 2021-04-21 | End: 2021-04-26 | Stop reason: HOSPADM

## 2021-04-20 RX ORDER — FENOFIBRATE 145 MG/1
145 TABLET, COATED ORAL DAILY
Status: DISCONTINUED | OUTPATIENT
Start: 2021-04-21 | End: 2021-04-22

## 2021-04-20 RX ORDER — SODIUM CHLORIDE 9 MG/ML
75 INJECTION, SOLUTION INTRAVENOUS CONTINUOUS
Status: DISCONTINUED | OUTPATIENT
Start: 2021-04-20 | End: 2021-04-22

## 2021-04-20 RX ORDER — GUAIFENESIN 100 MG/5ML
81 LIQUID (ML) ORAL DAILY
Status: DISCONTINUED | OUTPATIENT
Start: 2021-04-21 | End: 2021-04-26 | Stop reason: HOSPADM

## 2021-04-20 RX ADMIN — EZETIMIBE 10 MG: 10 TABLET ORAL at 22:38

## 2021-04-20 RX ADMIN — CARVEDILOL 6.25 MG: 6.25 TABLET, FILM COATED ORAL at 17:58

## 2021-04-20 RX ADMIN — SODIUM CHLORIDE 75 ML/HR: 9 INJECTION, SOLUTION INTRAVENOUS at 17:58

## 2021-04-20 RX ADMIN — SUCRALFATE 1 G: 1 TABLET ORAL at 22:38

## 2021-04-20 NOTE — Clinical Note
Right radial artery. Accessed successfully.  6FR X 10 CM SLENDER GLIDESHEATH INSERTED RIGHT RADIAL ARTERY

## 2021-04-20 NOTE — H&P
History & Physical    Primary Care Provider: Tanner Reynoso MD  Source of Information: Patient     History of Presenting Illness:   Viridiana Hall is a 68 y.o. female who presents with chest pain. History was probably obtained from the patient, patient reports that she has extensive cardiac history and doctors with Dr. Milton Guadalupe, she reports that she has history of COPD and chronic respiratory failure and is on 3 L oxygen at baseline. Patient reports that she woke up this morning and had sudden onset of left-sided chest pain associated with diaphoresis and lightheadedness. Patient reports that she tried to go to the bathroom but felt extremely lightheaded, got concerned and called her children for help, patient was brought to the hospital for further management and evaluation. Patient reports that she is undergoing evaluation because at some point of time she was found to have hypercalcemia and therefore she is set up for parathyroidectomy needs per her endocrinologist.  Patient denies any other complaints or problems. .  Reports that she has been taking her medications on a regular basis    The patient denies any Headache, blurry vision, sore throat, trouble swallowing, trouble with speech, cough, fever, chills, N/V/D, abd pain, urinary symptoms, constipation, recent travels, sick contacts, focal or generalized neurological symptoms,  falls, injuries, rashes, contact with COVID-19 diagnosed patients, hematemesis, melena, hemoptysis, hematuria, rashes, denies starting any new medications and denies any other concerns or problems besides as mentioned above. Review of Systems:  A comprehensive review of systems was negative except for that written in the History of Present Illness.    All other systems reviewed, pertinent positives and negatives noted in HPI    Past Medical History:   Diagnosis Date    Anemia NEC     Arthritis     Asthma     CAD (coronary artery disease)     NSTEMI following PCI    Calculus of kidney 7-    [facility]    Cancer ([facility] [address]     skin    Chronic pain     degenerative disc disease, lower right back    COPD     Depression     Diabetes [facility]     GERD (gastroesophageal reflux disease)     Hx of mammogram 11/29/2017    [address] [facility] - No mammographic evidence of malignancy - annual follow up recommended     Hypertension     2005 Dx    Joint pain     Morbid obesity ([facility] [address]     Psychiatric disorder     depression    Rheumatic fever     as a child    Sleep apnea     on CPAP    Sleep disorder       Past Surgical History:   Procedure Laterality Date    COLONOSCOPY N/A 7/30/2019    COLONOSCOPY performed by Aditya Doherty MD at [address] [place] [address]  7/30/2019         HX APPENDECTOMY      in 30's    HX CHOLECYSTECTOMY      with stent placement    HX CORONARY STENT PLACEMENT  dec 2013    HX HYSTERECTOMY      HX LITHOTRIPSY      HX TRABECULECTOMY      HX TUBAL LIGATION      MS CARDIAC SURG PROCEDURE UNLIST      4 stents    MS ERCP REMOVE CALCULI/DEBRIS BILIARY/PANCREAS DUCT  5/15/2020         MS ERCP REMOVE FOREIGN BODY/STENT BILIARY/PANC DUCT  5/15/2020         MS ERCP W/SPHINCTEROTOMY/PAPILLOTOMY  5/15/2020         UPPER GI ENDOSCOPY,BIOPSY  7/30/2019         UPPER GI ENDOSCOPY,BIOPSY  5/15/2020          Prior to Admission medications    Medication Sig Start Date End Date Taking? Authorizing Provider   sucralfate (CARAFATE) 1 gram tablet Take 1 g by mouth four (4) times daily. Provider, Historical   ondansetron (Zofran ODT) 4 mg disintegrating tablet Take 4 mg by mouth every eight (8) hours as needed for Nausea or Vomiting. Provider, Historical   azelastine (ASTEPRO) 0.15 % (205.5 mcg) by Both Nostrils route daily. Provider, Historical   albuterol (PROVENTIL VENTOLIN) 2.5 mg /3 mL (0.083 %) nebu 3 mL by Nebulization route four (4) times daily.  11/25/19   Serina Swartz MD glycopyrrolate-formoterol (BEVESPI AEROSPHERE) 9-4.8 mcg HFAA Take 2 Puffs by inhalation two (2) times a day. 11/25/19   Gustavo Henderson MD   pravastatin (PRAVACHOL) 40 mg tablet Take 1 Tab by mouth nightly. Patient taking differently: Take 80 mg by mouth nightly. 11/25/19   Gustavo Henderson MD   acetaminophen (TYLENOL) 500 mg tablet Take 1 Tab by mouth every four (4) hours as needed for Pain or Fever. 11/25/19   Gustavo Henderson MD   fenofibrate (LOFIBRA) 160 mg tablet Take 1 Tab by mouth daily. 11/25/19   Gustavo Henderson MD   magnesium oxide (MAG-OX) 400 mg tablet Take 1 Tab by mouth two (2) times a day. Patient taking differently: Take 400 mg by mouth as needed. 11/25/19   Gustavo Henderson MD   omeprazole (PRILOSEC) 20 mg capsule Take 1 Cap by mouth nightly. 11/25/19   Gustavo Henderson MD   senna-docusate (PERICOLACE) 8.6-50 mg per tablet Take 2 Tabs by mouth two (2) times a day. Patient taking differently: Take 2 Tabs by mouth as needed. 11/25/19   Gustavo Henderson MD   [facility]) 250 mg tablet Take 1 Tab by mouth every Monday, Wednesday, Friday. 11/25/19   MD ANANYA Zafar U-100 INSULIN 100 unit/mL (3 mL) inpn 25 Units by SubCUTAneous route daily. Patient taking differently: 10 Units by SubCUTAneous route daily. 11/25/19   Gustavo Henderson MD   ezetimibe (ZETIA) 10 mg tablet Take 10 mg by mouth nightly. 4/25/18   Provider, Historical   glimepiride (AMARYL) 2 mg tablet 2 mg two (2) times a day. 4/14/18   Provider, Historical   ACCU-CHEK SHELIA PLUS TEST STRP strip TEST BLOOD SUAGR TWICE A DAY 3/24/18   Provider, Historical   lisinopril (PRINIVIL, ZESTRIL) 10 mg tablet Take 0.5 Tabs by mouth daily. 11/20/16   Fernando Pinzon MD   DULoxetine (CYMBALTA) 60 mg capsule Take 1 capsule by mouth  every day 11/25/15   Luanne Julien MD   aspirin 81 mg chewable tablet Take 1 Tab by mouth daily.  12/20/13   Hetal Jones MD   carvedilol (COREG) 6.25 mg tablet Take 1 Tab by mouth two (2) times daily (with meals). 12/20/13   Hortencia Jones MD   albuterol (PROVENTIL HFA) 90 mcg/Actuation inhaler Take 2 Puffs by inhalation every four (4) hours as needed. Indications: BRONCHOSPASM PREVENTION 6/8/11   Provider, Historical   omega-3 fatty acids-vitamin e (FISH OIL) 1,000 mg Cap Take 1 Cap by mouth daily. cholesterol 8/28/10   Provider, Historical     Allergies   Allergen Reactions    Codeine Shortness of Breath    Crestor [Rosuvastatin] Other (comments)     Leg pains and could not walk    Lipitor [Atorvastatin] Myalgia    Tetanus Vaccines And Toxoid Swelling      Family History   Problem Relation Age of Onset   [facility] Arthritis-rheumatoid Sister     Osteoporosis Sister     Diabetes Brother     Stroke Brother     Elevated Lipids Mother     Alcohol abuse Father     Elevated Lipids Father     Cancer Sister     Diabetes Sister     Cancer Sister     Diabetes Brother     Diabetes Brother     Heart Disease Brother     Diabetes Brother     Diabetes Brother     Heart Disease Brother     Diabetes Maternal Aunt       Family history was discussed with the patient, all pertinent and relevant details are mentioned as above, no other pertinent and relevant family history was noted on my discussion with the patient.   Patient specifically denies any history of Gaucher disease in the family  SOCIAL HISTORY:  Patient resides:  Independently X   Assisted Living    SNF    With family care       Smoking history:   None    Former X   Chronic      Alcohol history:   None    Social X   Chronic      Ambulates:   Independently X   w/cane    w/walker    w/wc    CODE STATUS:  DNR    Full X   Other      Objective:     Physical Exam:     Visit Vitals  BP (!) 126/55   Pulse 72   Temp 98 °F (36.7 °C)   Resp 18   SpO2 96%    O2 Flow Rate (L/min): 3 l/min O2 Device: Nasal cannula    General : alert x 3, awake, no acute distress, resting in bed, pleasant /female, appears to be stated age  HEENT: PEERL, EOMI, moist mucus membrane, TM clear  Neck: supple, no JVD, no meningeal signs  Chest: Clear to auscultation bilaterally   CVS: S1 S2 heard, Capillary refill less than 2 seconds  Abd: soft/ Non tender, non distended, BS physiological,   Ext: no clubbing, no cyanosis, no edema, brisk 2+ DP pulses  Neuro/Psych: pleasant mood and affect, CN 2-12 grossly intact, sensory grossly within normal limit, Strength 5/5 in all extremities, DTR 1+ x 4  Skin: warm     EKG: Normal sinus rhythm with nonspecific ST changes      Data Review:     Recent Days:  Recent Labs     04/20/21  1238   WBC 5.3   HGB 13.3   HCT 39.3        Recent Labs     04/20/21  1238      K 4.0      CO2 29   *   BUN 21*   CREA 1.32*   CA 9.9   ALB 3.7   ALT 22     No results for input(s): PH, PCO2, PO2, HCO3, FIO2 in the last 72 hours.     24 Hour Results:  Recent Results (from the past 24 hour(s))   EKG, 12 LEAD, INITIAL    Collection Time: 04/20/21 12:34 PM   Result Value Ref Range    Ventricular Rate 82 BPM    Atrial Rate 82 BPM    P-R Interval 124 ms    QRS Duration 78 ms    Q-T Interval 344 ms    QTC Calculation (Bezet) 401 ms    Calculated P Axis 72 degrees    Calculated R Axis 48 degrees    Calculated T Axis 91 degrees    Diagnosis       Normal sinus rhythm  T wave abnormality, consider lateral ischemia  When compared with ECG of 21-JUN-2019 15:32,  Nonspecific T wave abnormality now evident in Inferior leads  T wave inversion now evident in Lateral leads     CBC WITH AUTOMATED DIFF    Collection Time: 04/20/21 12:38 PM   Result Value Ref Range    WBC 5.3 3.6 - 11.0 K/uL    RBC 4.33 3.80 - 5.20 M/uL    HGB 13.3 11.5 - 16.0 g/dL    HCT 39.3 35.0 - 47.0 %    MCV 90.8 80.0 - 99.0 FL    MCH 30.7 26.0 - 34.0 PG    MCHC 33.8 30.0 - 36.5 g/dL    RDW 13.4 11.5 - 14.5 %    PLATELET 363 535 - 984 K/uL    MPV 10.1 8.9 - 12.9 FL    NRBC 0.0 0  WBC    ABSOLUTE NRBC 0.00 0.00 - 0.01 K/uL    NEUTROPHILS 63 32 - 75 %    LYMPHOCYTES 23 12 - 49 % MONOCYTES 12 5 - 13 %    EOSINOPHILS 1 0 - 7 %    BASOPHILS 0 0 - 1 %    IMMATURE GRANULOCYTES 1 (H) 0.0 - 0.5 %    ABS. NEUTROPHILS 3.3 1.8 - 8.0 K/UL    ABS. LYMPHOCYTES 1.2 0.8 - 3.5 K/UL    ABS. MONOCYTES 0.7 0.0 - 1.0 K/UL    ABS. EOSINOPHILS 0.1 0.0 - 0.4 K/UL    ABS. BASOPHILS 0.0 0.0 - 0.1 K/UL    ABS. IMM. GRANS. 0.0 0.00 - 0.04 K/UL    DF AUTOMATED     METABOLIC PANEL, COMPREHENSIVE    Collection Time: 04/20/21 12:38 PM   Result Value Ref Range    Sodium 138 136 - 145 mmol/L    Potassium 4.0 3.5 - 5.1 mmol/L    Chloride 105 97 - 108 mmol/L    CO2 29 21 - 32 mmol/L    Anion gap 4 (L) 5 - 15 mmol/L    Glucose 322 (H) 65 - 100 mg/dL    BUN 21 (H) 6 - 20 MG/DL    Creatinine 1.32 (H) 0.55 - 1.02 MG/DL    BUN/Creatinine ratio 16 12 - 20      GFR est AA 47 (L) >60 ml/min/1.73m2    GFR est non-AA 39 (L) >60 ml/min/1.73m2    Calcium 9.9 8.5 - 10.1 MG/DL    Bilirubin, total 0.5 0.2 - 1.0 MG/DL    ALT (SGPT) 22 12 - 78 U/L    AST (SGOT) 12 (L) 15 - 37 U/L    Alk. phosphatase 76 45 - 117 U/L    Protein, total 6.4 6.4 - 8.2 g/dL    Albumin 3.7 3.5 - 5.0 g/dL    Globulin 2.7 2.0 - 4.0 g/dL    A-G Ratio 1.4 1.1 - 2.2     SAMPLES BEING HELD    Collection Time: 04/20/21 12:38 PM   Result Value Ref Range    SAMPLES BEING HELD 1BLU,1RED     COMMENT        Add-on orders for these samples will be processed based on acceptable specimen integrity and analyte stability, which may vary by analyte. TROPONIN I    Collection Time: 04/20/21 12:38 PM   Result Value Ref Range    Troponin-I, Qt. <0.05 <0.05 ng/mL         Imaging:   Xr Chest Pa Lat    Result Date: 4/20/2021  No acute disease. No significant interval change. Assessment/Plan     Chest pain: Concern for ACS, patient will be admitted on a telemetry bed, spoke with Dr. Magdaleno Frazier, he recommends giving patient 1 dose of therapeutic Lovenox tonight, keeping patient n.p.o. after midnight for cath at 7 AM tomorrow morning.   Cycle troponins, nitroglycerin as needed, supportive care, close monitoring, continue beta-blockers, continue aspirin, further intervention per hospitalist, reassess as needed, continue to monitor    Chronic hypoxic respiratory failure: Secondary to COPD, continue oxygen support, duo nebs, supportive care, close monitoring, reassess as needed    Diabetes mellitus type 2: Poorly controlled, sliding scale insulin, Accu-Cheks, diet control, close monitoring, further intervention per hospitalist, reassess as needed    RENA:?  Prerenal, avoid nephrotoxic medication, renally dose all medication, nephrology consult, IV hydration, repeat labs in the morning, renal ultrasound, supportive care and close monitoring, further intervention per hospital course, reassess as needed    GI DVT prophylaxis: Patient will be given 1 dose of Lovenox tonight               Please note that this dictation was completed with Kadenze, the computer voice recognition software. Quite often unanticipated grammatical, syntax, homophones, and other interpretive errors are inadvertently transcribed by the computer software. Please disregard these errors. Please excuse any errors that have escaped final proofreading.          Signed By: Kellee Azevedo MD     April 20, 2021

## 2021-04-20 NOTE — CONSULTS
Cardiology Consult Note    CC: CP  Reason for consult:  CP/SOB  Requesting MD: Dr. Jesica Rosen     Subjective:      Date of  Admission: 4/20/2021 12:28 PM     Admission type:Emergency    Shayna Bridges is a 68 y.o. female admitted for Chest pain [R07.9]. Patient complains of SS to upper chest tightness with sweats and dizziness and SOB. She has been more SOB lately and has an appointment with her pulmonologist. Her past cardiac data include CAD. S/p stents in LAD in 12/12 and stent in Ramus and distal LPLA in 8/20. Her CP with dizziness and sweats is same as her previous angina.      Patient Active Problem List    Diagnosis Date Noted    Chest pain 04/20/2021    Dilation of common bile duct 11/16/2019    Dizziness and giddiness 07/21/2015    COPD exacerbation ([address] 01/01/2013    Depression 02/29/2012    Vitamin D deficiency 02/29/2012    Chronic low back pain 06/08/2011    DM (diabetes mellitus) ([address] 06/04/2010    Hyperlipidemia 06/04/2010    HTN (hypertension) 06/04/2010      Terra Mccabe MD  Past Medical History:   Diagnosis Date    Anemia NEC     Arthritis     Asthma     CAD (coronary artery disease)     NSTEMI following PCI    Calculus of kidney 7-    [facility]    Cancer ([address]     skin    Chronic pain     degenerative disc disease, lower right back    COPD     Depression     Diabetes ([address]     GERD (gastroesophageal reflux disease)     Hx of mammogram 11/29/2017    [address] [facility] - No mammographic evidence of malignancy - annual follow up recommended     Hypertension     2005 Dx    Joint pain     Morbid obesity ([address]     Psychiatric disorder     depression    Rheumatic fever     as a child    Sleep apnea     on CPAP    Sleep disorder       Past Surgical History:   Procedure Laterality Date    COLONOSCOPY N/A 7/30/2019    COLON[facility]bin [facility]COPY    COLONOSCOPY,CASSIDY [place]  7/30/2019         HX APPENDECTOMY      in 30's    HX CHOLECYSTECTOMY      with stent placement    HX CORONARY STENT PLACEMENT  dec 2013    HX HYSTERECTOMY      HX LITHOTRIPSY      HX TRABECULECTOMY      HX TUBAL LIGATION      DC CARDIAC SURG PROCEDURE UNLIST      4 stents    DC ERCP REMOVE CALCULI/DEBRIS BILIARY/PANCREAS DUCT  5/15/2020         DC ERCP REMOVE FOREIGN BODY/STENT BILIARY/PANC DUCT  5/15/2020         DC ERCP W/SPHINCTEROTOMY/PAPILLOTOMY  5/15/2020         UPPER GI ENDOSCOPY,BIOPSY  7/30/2019         UPPER GI ENDOSCOPY,BIOPSY  5/15/2020          Allergies   Allergen Reactions    Codeine Shortness of Breath    Crestor [Rosuvastatin] Other (comments)     Leg pains and could not walk    Lipitor [Atorvastatin] Myalgia    Tetanus Vaccines And Toxoid Swelling      Family History   Problem Relation Age of Onset   [de-identified] Arthritis-rheumatoid Sister     Osteoporosis Sister     Diabetes Brother     Stroke Brother     Elevated Lipids Mother     Alcohol abuse Father     Elevated Lipids Father     Cancer Sister     Diabetes Sister     Cancer Sister     Diabetes Brother     Diabetes Brother     Heart Disease Brother     Diabetes Brother     Diabetes Brother     Heart Disease Brother     Diabetes Maternal Aunt       No current facility-administered medications for this encounter. Current Outpatient Medications   Medication Sig    sucralfate (CARAFATE) 1 gram tablet Take 1 g by mouth four (4) times daily.  ondansetron (Zofran ODT) 4 mg disintegrating tablet Take 4 mg by mouth every eight (8) hours as needed for Nausea or Vomiting.  azelastine (ASTEPRO) 0.15 % (205.5 mcg) by Both Nostrils route daily.  albuterol (PROVENTIL VENTOLIN) 2.5 mg /3 mL (0.083 %) nebu 3 mL by Nebulization route four (4) times daily.  glycopyrrolate-formoterol (BEVESPI AEROSPHERE) 9-4.8 mcg HFAA Take 2 Puffs by inhalation two (2) times a day.  pravastatin (PRAVACHOL) 40 mg tablet Take 1 Tab by mouth nightly.  (Patient taking differently: Take 80 mg by mouth nightly.)    acetaminophen (TYLENOL) 500 mg tablet Take 1 Tab by mouth every four (4) hours as needed for Pain or Fever.  fenofibrate (LOFIBRA) 160 mg tablet Take 1 Tab by mouth daily.  magnesium oxide (MAG-OX) 400 mg tablet Take 1 Tab by mouth two (2) times a day. (Patient taking differently: Take 400 mg by mouth as needed.)    omeprazole (PRILOSEC) 20 mg capsule Take 1 Cap by mouth nightly.  senna-docusate (PERICOLACE) 8.6-50 mg per tablet Take 2 Tabs by mouth two (2) times a day. (Patient taking differently: Take 2 Tabs by mouth as needed.)    azithromycin (ZITHROMAX) 250 mg tablet Take 1 Tab by mouth every Monday, Wednesday, Friday.  LANTUS SOLOSTAR U-100 INSULIN 100 unit/mL (3 mL) inpn 25 Units by SubCUTAneous route daily. (Patient taking differently: 10 Units by SubCUTAneous route daily.)    ezetimibe (ZETIA) 10 mg tablet Take 10 mg by mouth nightly.  glimepiride (AMARYL) 2 mg tablet 2 mg two (2) times a day.  ACCU-CHEK SHELIA PLUS TEST STRP strip TEST BLOOD SUAGR TWICE A DAY    lisinopril (PRINIVIL, ZESTRIL) 10 mg tablet Take 0.5 Tabs by mouth daily.  DULoxetine (CYMBALTA) 60 mg capsule Take 1 capsule by mouth  every day    aspirin 81 mg chewable tablet Take 1 Tab by mouth daily.  carvedilol (COREG) 6.25 mg tablet Take 1 Tab by mouth two (2) times daily (with meals).  albuterol (PROVENTIL HFA) 90 mcg/Actuation inhaler Take 2 Puffs by inhalation every four (4) hours as needed. Indications: BRONCHOSPASM PREVENTION    omega-3 fatty acids-vitamin e (FISH OIL) 1,000 mg Cap Take 1 Cap by mouth daily. cholesterol        Prior to Admission Medications:  Prior to Admission medications    Medication Sig Start Date End Date Taking? Authorizing Provider   sucralfate (CARAFATE) 1 gram tablet Take 1 g by mouth four (4) times daily. Provider, Historical   ondansetron (Zofran ODT) 4 mg disintegrating tablet Take 4 mg by mouth every eight (8) hours as needed for Nausea or Vomiting. Provider, Historical   azelastine (ASTEPRO) 0.15 % (205.5 mcg) by Both Nostrils route daily. Provider, Historical   albuterol (PROVENTIL VENTOLIN) 2.5 mg /3 mL (0.083 %) nebu 3 mL by Nebulization route four (4) times daily. 11/25/19   Sebastian Aviles MD   glycopyrrolate-formoterol (BEVESPI AEROSPHERE) 9-4.8 mcg HFAA Take 2 Puffs by inhalation two (2) times a day. 11/25/19   Sebastian Aviles MD   pravastatin (PRAVACHOL) 40 mg tablet Take 1 Tab by mouth nightly. Patient taking differently: Take 80 mg by mouth nightly. 11/25/19   Sebastian Aviles MD   acetaminophen (TYLENOL) 500 mg tablet Take 1 Tab by mouth every four (4) hours as needed for Pain or Fever. 11/25/19   Sebastian Aviles MD   fenofibrate (LOFIBRA) 160 mg tablet Take 1 Tab by mouth daily. 11/25/19   Sebastian Aviles MD   magnesium oxide (MAG-OX) 400 mg tablet Take 1 Tab by mouth two (2) times a day. Patient taking differently: Take 400 mg by mouth as needed. 11/25/19   Sebastian Aviles MD   omeprazole (PRILOSEC) 20 mg capsule Take 1 Cap by mouth nightly. 11/25/19   Sebastian Aviles MD   senna-docusate (PERICOLACE) 8.6-50 mg per tablet Take 2 Tabs by mouth two (2) times a day. Patient taking differently: Take 2 Tabs by mouth as needed. 11/25/19   Sebastian Aviles MD   azithromycin [facility]) 250 mg tablet Take 1 Tab by mouth every Monday, Wednesday, Friday. 11/25/19   Sebastian Aviles MD   LANTUS SOLOSTAR U-100 INSULIN 100 unit/mL (3 mL) inpn 25 Units by SubCUTAneous route daily. Patient taking differently: 10 Units by SubCUTAneous route daily. 11/25/19   Sebastian Aviles MD   ezetimibe (ZETIA) 10 mg tablet Take 10 mg by mouth nightly. 4/25/18   Provider, Historical   glimepiride (AMARYL) 2 mg tablet 2 mg two (2) times a day. 4/14/18   Provider, Historical   ACCU-CHEK SHELIA PLUS TEST STRP strip TEST BLOOD SUAGR TWICE A DAY 3/24/18   Provider, Historical   lisinopril (PRINIVIL, ZESTRIL) 10 mg tablet Take 0.5 Tabs by mouth daily.  11/20/16   Debeb, Meg Hu MD   DULoxetine (CYMBALTA) 60 mg capsule Take 1 capsule by mouth  every day 11/25/15   Corine Cardona MD   aspirin 81 mg chewable tablet Take 1 Tab by mouth daily. 13   Sanjiv Faye MD   carvedilol (COREG) 6.25 mg tablet Take 1 Tab by mouth two (2) times daily (with meals). 13   Travon Jones MD   albuterol (PROVENTIL HFA) 90 mcg/Actuation inhaler Take 2 Puffs by inhalation every four (4) hours as needed. Indications: BRONCHOSPASM PREVENTION 11   Provider, Historical   omega-3 fatty acids-vitamin e (FISH OIL) 1,000 mg Cap Take 1 Cap by mouth daily. cholesterol 8/28/10   Provider, Historical        Review of Symptoms:  Except as noted in HPI, patient denies recent fever or chills, nausea, vomiting, diarrhea, hemoptysis, hematemesis, dysuria, myalgias, focal neurologic symptoms, ecchymosis, angioedema, odynophagia, dysphagia, sore throat, earache,rash, melena, hematochezia, depression, GERD, cold intolerance, petechia, bleeding gums, or significant weight loss. A comprehensive review of systems was negative except for that written in the HPI.      Subjective:    24 hr VS reviewed, overall VSSAF  Temp (24hrs), Av °F (36.7 °C), Min:98 °F (36.7 °C), Max:98 °F (36.7 °C)    Patient Vitals for the past 8 hrs:   Pulse   21 1515 63   21 1445 72   21 1300 76   21 1245 74   21 1237 81    Patient Vitals for the past 8 hrs:   Resp   21 1515 19   21 1445 15   21 1300 13   21 1245 18   21 1237 18    Patient Vitals for the past 8 hrs:   BP   21 1515 120/66   21 1445 117/65   21 1300 138/68   21 1245 (!) 152/57   21 1237 (!) 152/57        No intake or output data in the 24 hours ending 21 1602      Physical Exam (complete single organ system exam)      Visit Vitals  /66   Pulse 63   Temp 98 °F (36.7 °C)   Resp 19   SpO2 96%     General Appearance:  Well developed, well nourished,alert and oriented x 3, and individual in no acute distress. Ears/Nose/Mouth/Throat:   Hearing grossly normal.         Neck: Supple. Chest:   Lungs clear to auscultation bilaterally. Cardiovascular:  Regular rate and rhythm, S1, S2 normal, no murmur. Abdomen:   Soft, non-tender, bowel sounds are active. Extremities: No edema bilaterally. Skin: Warm and dry. Cardiographics    Telemetry: normal sinus rhythm  ECG: normal EKG, normal sinus rhythm, unchanged from previous tracings  Echocardiogram: Not done    Labs:   Recent Results (from the past 24 hour(s))   EKG, 12 LEAD, INITIAL    Collection Time: 04/20/21 12:34 PM   Result Value Ref Range    Ventricular Rate 82 BPM    Atrial Rate 82 BPM    P-R Interval 124 ms    QRS Duration 78 ms    Q-T Interval 344 ms    QTC Calculation (Bezet) 401 ms    Calculated P Axis 72 degrees    Calculated R Axis 48 degrees    Calculated T Axis 91 degrees    Diagnosis       Normal sinus rhythm  T wave abnormality, consider lateral ischemia  When compared with ECG of 21-JUN-2019 15:32,  Nonspecific T wave abnormality now evident in Inferior leads  T wave inversion now evident in Lateral leads     CBC WITH AUTOMATED DIFF    Collection Time: 04/20/21 12:38 PM   Result Value Ref Range    WBC 5.3 3.6 - 11.0 K/uL    RBC 4.33 3.80 - 5.20 M/uL    HGB 13.3 11.5 - 16.0 g/dL    HCT 39.3 35.0 - 47.0 %    MCV 90.8 80.0 - 99.0 FL    MCH 30.7 26.0 - 34.0 PG    MCHC 33.8 30.0 - 36.5 g/dL    RDW 13.4 11.5 - 14.5 %    PLATELET 314 437 - 708 K/uL    MPV 10.1 8.9 - 12.9 FL    NRBC 0.0 0  WBC    ABSOLUTE NRBC 0.00 0.00 - 0.01 K/uL    NEUTROPHILS 63 32 - 75 %    LYMPHOCYTES 23 12 - 49 %    MONOCYTES 12 5 - 13 %    EOSINOPHILS 1 0 - 7 %    BASOPHILS 0 0 - 1 %    IMMATURE GRANULOCYTES 1 (H) 0.0 - 0.5 %    ABS. NEUTROPHILS 3.3 1.8 - 8.0 K/UL    ABS. LYMPHOCYTES 1.2 0.8 - 3.5 K/UL    ABS. MONOCYTES 0.7 0.0 - 1.0 K/UL    ABS. EOSINOPHILS 0.1 0.0 - 0.4 K/UL    ABS. BASOPHILS 0.0 0.0 - 0.1 K/UL    ABS. IMM. [place]. 0.0 0.00 - 0.04 K/UL    DF AUTOMATED     METABOLIC PANEL, COMPREHENSIVE    Collection Time: 04/20/21 12:38 PM   Result Value Ref Range    Sodium 138 136 - 145 mmol/L    Potassium 4.0 3.5 - 5.1 mmol/L    Chloride 105 97 - 108 mmol/L    CO2 29 21 - 32 mmol/L    Anion gap 4 (L) 5 - 15 mmol/L    Glucose 322 (H) 65 - 100 mg/dL    BUN 21 (H) 6 - 20 MG/DL    Creatinine 1.32 (H) 0.55 - 1.02 MG/DL    BUN/Creatinine ratio 16 12 - 20      GFR est AA 47 (L) >60 ml/min/1.73m2    GFR est non-AA 39 (L) >60 ml/min/1.73m2    Calcium 9.9 8.5 - 10.1 MG/DL    Bilirubin, total 0.5 0.2 - 1.0 MG/DL    ALT (SGPT) 22 12 - 78 U/L    AST (SGOT) 12 (L) 15 - 37 U/L    Alk. phosphatase 76 45 - 117 U/L    Protein, total 6.4 6.4 - 8.2 g/dL    Albumin 3.7 3.5 - 5.0 g/dL    Globulin 2.7 2.0 - 4.0 g/dL    A-G Ratio 1.4 1.1 - 2.2     SAMPLES BEING HELD    Collection Time: 04/20/21 12:38 PM   Result Value Ref Range    SAMPLES BEING HELD 1BLU,1RED     COMMENT        Add-on orders for these samples will be processed based on acceptable specimen integrity and analyte stability, which may vary by analyte.    TROPONIN I    Collection Time: 04/20/21 12:38 PM   Result Value Ref Range    Troponin-I, Qt. <0.05 <0.05 ng/mL        Assessment:     Assessment:   CP; with abnormal ekg; consistent with ACS  CAD; s/p multiple stents suspect restenosis  HTN  COPD  CKF       Plan:   Tele  Trend enzymes  NTP  BB  ASA/Lovnox  She needs cardiac cath in am  Willie Crow MD

## 2021-04-20 NOTE — ED PROVIDER NOTES
HPI .  Patient ambulates with a walker. She says she is not smoking at this time. Patient has a history of COPD, asthma, hypertension, diabetes, 4 coronary stents around 2012, non-STEMI in the last year or 2 with 2 additional stents, sleep apnea, depression. About 14 months ago patient apparently had hypercalcemia and her endocrinologist was considering referring her for parathyroid surgery. Patient developed lower sternal and epigastric discomfort described as a pressure that started several hours prior to coming here and lasted 90 minutes. Discomfort seemed to resolve after 4 mg of IV Zofran and aspirin 324 mg given by EMS. Patient reports that she was short of breath, diaphoretic and nauseated during the episode. Discomfort was somewhat similar to her heart pain. Patient also felt weak during this episode and felt like if she stood up she might fall over. She is also having some vague intermittent blurriness of her vision this morning.     Past Medical History:   Diagnosis Date    Anemia NEC     Arthritis     Asthma     CAD (coronary artery disease)     NSTEMI following PCI    Calculus of kidney 7-    [facility]    Cancer ([facility]     skin    Chronic pain     degenerative disc disease, lower right back    COPD     Depression     Diabetes ([address]     GERD (gastroesophageal reflux disease)     Hx of mammogram 11/29/2017    [address] [facility] - No mammographic evidence of malignancy - annual follow up recommended     Hypertension     2005 Dx    Joint pain     Morbid obesity ([address]     Psychiatric disorder     depression    Rheumatic fever     as a child    Sleep apnea     on CPAP    Sleep disorder        Past Surgical History:   Procedure Laterality Date    COLONOSCOPY N/A 7/30/2019    COLON[facility] performed [facility] Haq [facility] at [facility] ENDOSCOPY    COLONOSCOPY,CASSIDY [place]  7/30/2019         HX APPENDECTOMY      in 30's    HX CHOLECYSTECTOMY      with stent placement    HX CORONARY STENT PLACEMENT  dec 2013    HX HYSTERECTOMY      HX LITHOTRIPSY      HX TRABECULECTOMY      HX TUBAL LIGATION      ID CARDIAC SURG PROCEDURE UNLIST      4 stents    ID ERCP REMOVE CALCULI/DEBRIS BILIARY/PANCREAS DUCT  5/15/2020         ID ERCP REMOVE FOREIGN BODY/STENT BILIARY/PANC DUCT  5/15/2020         ID ERCP W/SPHINCTEROTOMY/PAPILLOTOMY  5/15/2020         UPPER GI ENDOSCOPY,BIOPSY  2019         UPPER GI ENDOSCOPY,BIOPSY  5/15/2020              Family History:   Problem Relation Age of Onset    Arthritis-rheumatoid Sister     Osteoporosis Sister     Diabetes Brother     Stroke Brother     Elevated Lipids Mother     Alcohol abuse Father     Elevated Lipids Father     Cancer Sister     Diabetes Sister     Cancer Sister     Diabetes Brother     Diabetes Brother     Heart Disease Brother     Diabetes Brother     Diabetes Brother     Heart Disease Brother     Diabetes Maternal Aunt        Social History     Socioeconomic History    Marital status:      Spouse name: Not on file    Number of children: Not on file    Years of education: Not on file    Highest education level: Not on file   Occupational History    Not on file   Social Needs    Financial resource strain: Not on file    Food insecurity     Worry: Not on file     Inability: Not on file    Transportation needs     Medical: Not on file     Non-medical: Not on file   Tobacco Use    Smoking status: Former Smoker     Packs/day: 1.00     Years: 45.00     Pack years: 45.00     Quit date: 2013     Years since quittin.3    Smokeless tobacco: Never Used   Substance and Sexual Activity    Alcohol use: No    Drug use: No    Sexual activity: Never   Lifestyle    Physical activity     Days per week: Not on file     Minutes per session: Not on file    Stress: Not on file   Relationships    Social connections     Talks on phone: Not on file     Gets together: Not on file     Attends Jew service: Not on file     Active member of club or organization: Not on file     Attends meetings of clubs or organizations: Not on file     Relationship status: Not on file    Intimate partner violence     Fear of current or ex partner: Not on file     Emotionally abused: Not on file     Physically abused: Not on file     Forced sexual activity: Not on file   Other Topics Concern    Not on file   Social History Narrative    Not on file         ALLERGIES: Codeine, Crestor [rosuvastatin], Lipitor [atorvastatin], and Tetanus vaccines and toxoid    Review of Systems   Constitutional: Negative for appetite change and fever. HENT: Negative for congestion. Eyes: Negative for redness. Respiratory: Positive for shortness of breath. Cardiovascular: Positive for chest pain. Gastrointestinal: Negative for abdominal pain. Genitourinary: Negative for difficulty urinating. Neurological: Negative for speech difficulty. Psychiatric/Behavioral: Negative for agitation and behavioral problems. The patient is not nervous/anxious. Vitals:    04/20/21 1237 04/20/21 1244   BP: (!) 152/57    Pulse: 81    Resp: 18    Temp: 98 °F (36.7 °C)    SpO2: 96% 96%            Physical Exam  Vitals signs and nursing note reviewed. Constitutional:       Appearance: She is well-developed. HENT:      Head: Normocephalic and atraumatic. Eyes:      Pupils: Pupils are equal, round, and reactive to light. Neck:      Musculoskeletal: Normal range of motion and neck supple. Cardiovascular:      Rate and Rhythm: Normal rate and regular rhythm. Heart sounds: Murmur present. Systolic murmur present. No friction rub. No gallop. Pulmonary:      Effort: Pulmonary effort is normal. No respiratory distress. Breath sounds: No wheezing or rales. Abdominal:      Palpations: Abdomen is soft. Tenderness: There is no abdominal tenderness. There is no rebound. Musculoskeletal: Normal range of motion. General: No tenderness. Skin:     Findings: No erythema. Neurological:      Mental Status: She is alert. Cranial Nerves: No cranial nerve deficit. Comments: Motor; symmetric   Psychiatric:         Behavior: Behavior normal.          MDM       Procedures          ED EKG interpretation:  Rhythm: normal sinus rhythm; and regular . Rate (approx.): 80; Axis: normal; P wave: normal; QRS interval: normal ; ST/T wave: T wave inverted; in  Lead: V4 , V5  and V6 ; Other findings: . This EKG was interpreted by Uri Fish MD,ED Provider. 1:19 PM         CONSULT NOTE:  Spoke to Dr Bakari Frank concerning the patient. The patient's history, presentation, physical findings, and results were all discussed. 3:01 PM      Perfect Serve Consult for Admission  3:01 PM    ED Room Number: ER20/20  Patient Name and age:  Karon Pay 68 y.o.  female  Working Diagnosis:   1.  Acute chest pain        COVID-19 Suspicion:  no  Sepsis present:  no  Reassessment needed: no  Code Status:  Full Code  Readmission: no  Isolation Requirements:  no  Recommended Level of Care:  telemetry  Department:[facility] Adult ED - 21   Other: Dr. Bakari Frank consulted; he would like serial enzymes and admission and plans cardiac catheterization

## 2021-04-20 NOTE — ED TRIAGE NOTES
Pt arrives via EMS from home c/o chest pain and nausea that started at 1100 after eating breakfast.  Pt denies CP on arrival.  Pt given ASA 324mg and Zofran 4mg IV in route.

## 2021-04-20 NOTE — ROUTINE PROCESS
TRANSFER - OUT REPORT: 
 
Verbal report given to MAXIMILIANO Barajas(name) on Jose Maria  being transferred to 468(unit) for routine progression of care Report consisted of patients Situation, Background, Assessment and  
Recommendations(SBAR). Information from the following report(s) SBAR, ED Summary, Intake/Output, MAR and Recent Results was reviewed with the receiving nurse. Lines:  
Peripheral IV 04/20/21 Left Antecubital (Active) Site Assessment Clean, dry, & intact 04/20/21 1234 Phlebitis Assessment 0 04/20/21 1234 Infiltration Assessment 0 04/20/21 1234 Dressing Status Clean, dry, & intact 04/20/21 1234 Dressing Type Transparent 04/20/21 1234 Opportunity for questions and clarification was provided. Patient transported with: 
 [employer]

## 2021-04-21 ENCOUNTER — APPOINTMENT (OUTPATIENT)
Dept: VASCULAR SURGERY | Age: 81
DRG: 286 | End: 2021-04-21
Attending: INTERNAL MEDICINE
Payer: MEDICARE

## 2021-04-21 ENCOUNTER — APPOINTMENT (OUTPATIENT)
Dept: NUCLEAR MEDICINE | Age: 81
DRG: 286 | End: 2021-04-21
Attending: INTERNAL MEDICINE
Payer: MEDICARE

## 2021-04-21 LAB
ALBUMIN SERPL-MCNC: 3.5 G/DL (ref 3.5–5)
ALBUMIN/GLOB SERPL: 1.3 {RATIO} (ref 1.1–2.2)
ALP SERPL-CCNC: 64 U/L (ref 45–117)
ALT SERPL-CCNC: 20 U/L (ref 12–78)
ANION GAP SERPL CALC-SCNC: 5 MMOL/L (ref 5–15)
AST SERPL-CCNC: 12 U/L (ref 15–37)
BASOPHILS # BLD: 0 K/UL (ref 0–0.1)
BASOPHILS NFR BLD: 0 % (ref 0–1)
BILIRUB SERPL-MCNC: 0.4 MG/DL (ref 0.2–1)
BUN SERPL-MCNC: 21 MG/DL (ref 6–20)
BUN/CREAT SERPL: 17 (ref 12–20)
CALCIUM SERPL-MCNC: 10.7 MG/DL (ref 8.5–10.1)
CHLORIDE SERPL-SCNC: 105 MMOL/L (ref 97–108)
CHOLEST SERPL-MCNC: 139 MG/DL
CO2 SERPL-SCNC: 31 MMOL/L (ref 21–32)
CREAT SERPL-MCNC: 1.24 MG/DL (ref 0.55–1.02)
DIFFERENTIAL METHOD BLD: ABNORMAL
EOSINOPHIL # BLD: 0.1 K/UL (ref 0–0.4)
EOSINOPHIL NFR BLD: 1 % (ref 0–7)
ERYTHROCYTE [DISTWIDTH] IN BLOOD BY AUTOMATED COUNT: 13.4 % (ref 11.5–14.5)
GLOBULIN SER CALC-MCNC: 2.7 G/DL (ref 2–4)
GLUCOSE BLD STRIP.AUTO-MCNC: 125 MG/DL (ref 65–100)
GLUCOSE BLD STRIP.AUTO-MCNC: 167 MG/DL (ref 65–100)
GLUCOSE BLD STRIP.AUTO-MCNC: 170 MG/DL (ref 65–100)
GLUCOSE BLD STRIP.AUTO-MCNC: 312 MG/DL (ref 65–100)
GLUCOSE SERPL-MCNC: 166 MG/DL (ref 65–100)
HCT VFR BLD AUTO: 37.4 % (ref 35–47)
HDLC SERPL-MCNC: 34 MG/DL
HDLC SERPL: 4.1 {RATIO} (ref 0–5)
HGB BLD-MCNC: 12.2 G/DL (ref 11.5–16)
IMM GRANULOCYTES # BLD AUTO: 0 K/UL (ref 0–0.04)
IMM GRANULOCYTES NFR BLD AUTO: 1 % (ref 0–0.5)
LDLC SERPL CALC-MCNC: 65 MG/DL (ref 0–100)
LIPID PROFILE,FLP: ABNORMAL
LYMPHOCYTES # BLD: 2.1 K/UL (ref 0.8–3.5)
LYMPHOCYTES NFR BLD: 36 % (ref 12–49)
MCH RBC QN AUTO: 30.3 PG (ref 26–34)
MCHC RBC AUTO-ENTMCNC: 32.6 G/DL (ref 30–36.5)
MCV RBC AUTO: 93 FL (ref 80–99)
MONOCYTES # BLD: 0.7 K/UL (ref 0–1)
MONOCYTES NFR BLD: 12 % (ref 5–13)
NEUTS SEG # BLD: 2.8 K/UL (ref 1.8–8)
NEUTS SEG NFR BLD: 50 % (ref 32–75)
NRBC # BLD: 0 K/UL (ref 0–0.01)
NRBC BLD-RTO: 0 PER 100 WBC
PLATELET # BLD AUTO: 157 K/UL (ref 150–400)
PMV BLD AUTO: 10.3 FL (ref 8.9–12.9)
POTASSIUM SERPL-SCNC: 4.6 MMOL/L (ref 3.5–5.1)
PROT SERPL-MCNC: 6.2 G/DL (ref 6.4–8.2)
RBC # BLD AUTO: 4.02 M/UL (ref 3.8–5.2)
SERVICE CMNT-IMP: ABNORMAL
SODIUM SERPL-SCNC: 141 MMOL/L (ref 136–145)
TRIGL SERPL-MCNC: 200 MG/DL (ref ?–150)
TROPONIN I SERPL-MCNC: <0.05 NG/ML
VLDLC SERPL CALC-MCNC: 40 MG/DL
WBC # BLD AUTO: 5.8 K/UL (ref 3.6–11)

## 2021-04-21 PROCEDURE — C1876 STENT, NON-COA/NON-COV W/DEL: HCPCS | Performed by: INTERNAL MEDICINE

## 2021-04-21 PROCEDURE — 65660000000 HC RM CCU STEPDOWN

## 2021-04-21 PROCEDURE — 74011000250 HC RX REV CODE- 250: Performed by: INTERNAL MEDICINE

## 2021-04-21 PROCEDURE — 93458 L HRT ARTERY/VENTRICLE ANGIO: CPT | Performed by: INTERNAL MEDICINE

## 2021-04-21 PROCEDURE — 99152 MOD SED SAME PHYS/QHP 5/>YRS: CPT | Performed by: INTERNAL MEDICINE

## 2021-04-21 PROCEDURE — 77030013744: Performed by: INTERNAL MEDICINE

## 2021-04-21 PROCEDURE — 94640 AIRWAY INHALATION TREATMENT: CPT

## 2021-04-21 PROCEDURE — 74011636637 HC RX REV CODE- 636/637: Performed by: FAMILY MEDICINE

## 2021-04-21 PROCEDURE — 84484 ASSAY OF TROPONIN QUANT: CPT

## 2021-04-21 PROCEDURE — 74011000250 HC RX REV CODE- 250: Performed by: FAMILY MEDICINE

## 2021-04-21 PROCEDURE — 82962 GLUCOSE BLOOD TEST: CPT

## 2021-04-21 PROCEDURE — 74011250637 HC RX REV CODE- 250/637: Performed by: NURSE PRACTITIONER

## 2021-04-21 PROCEDURE — A9540 TC99M MAA: HCPCS

## 2021-04-21 PROCEDURE — 36415 COLL VENOUS BLD VENIPUNCTURE: CPT

## 2021-04-21 PROCEDURE — 77030010221 HC SPLNT WR POS TELE -B: Performed by: INTERNAL MEDICINE

## 2021-04-21 PROCEDURE — 80053 COMPREHEN METABOLIC PANEL: CPT

## 2021-04-21 PROCEDURE — 99153 MOD SED SAME PHYS/QHP EA: CPT | Performed by: INTERNAL MEDICINE

## 2021-04-21 PROCEDURE — 74011250637 HC RX REV CODE- 250/637: Performed by: FAMILY MEDICINE

## 2021-04-21 PROCEDURE — B2111ZZ FLUOROSCOPY OF MULTIPLE CORONARY ARTERIES USING LOW OSMOLAR CONTRAST: ICD-10-PCS | Performed by: SURGERY

## 2021-04-21 PROCEDURE — 74011636637 HC RX REV CODE- 636/637: Performed by: INTERNAL MEDICINE

## 2021-04-21 PROCEDURE — 74011250636 HC RX REV CODE- 250/636: Performed by: INTERNAL MEDICINE

## 2021-04-21 PROCEDURE — 74011250636 HC RX REV CODE- 250/636: Performed by: FAMILY MEDICINE

## 2021-04-21 PROCEDURE — 85025 COMPLETE CBC W/AUTO DIFF WBC: CPT

## 2021-04-21 PROCEDURE — 74011000636 HC RX REV CODE- 636: Performed by: INTERNAL MEDICINE

## 2021-04-21 PROCEDURE — 4A023N7 MEASUREMENT OF CARDIAC SAMPLING AND PRESSURE, LEFT HEART, PERCUTANEOUS APPROACH: ICD-10-PCS | Performed by: SURGERY

## 2021-04-21 PROCEDURE — 93970 EXTREMITY STUDY: CPT

## 2021-04-21 PROCEDURE — C1769 GUIDE WIRE: HCPCS | Performed by: INTERNAL MEDICINE

## 2021-04-21 PROCEDURE — 77030019569 HC BND COMPR RAD TERU -B: Performed by: INTERNAL MEDICINE

## 2021-04-21 PROCEDURE — 77030004532 HC CATH ANGI DX IMP BSC -A: Performed by: INTERNAL MEDICINE

## 2021-04-21 PROCEDURE — 80061 LIPID PANEL: CPT

## 2021-04-21 RX ORDER — INSULIN GLARGINE 100 [IU]/ML
25 INJECTION, SOLUTION SUBCUTANEOUS DAILY
Status: DISCONTINUED | OUTPATIENT
Start: 2021-04-21 | End: 2021-04-23

## 2021-04-21 RX ORDER — SODIUM CHLORIDE 0.9 % (FLUSH) 0.9 %
5-40 SYRINGE (ML) INJECTION AS NEEDED
Status: DISCONTINUED | OUTPATIENT
Start: 2021-04-21 | End: 2021-04-21 | Stop reason: HOSPADM

## 2021-04-21 RX ORDER — INSULIN LISPRO 100 [IU]/ML
4 INJECTION, SOLUTION INTRAVENOUS; SUBCUTANEOUS ONCE
Status: COMPLETED | OUTPATIENT
Start: 2021-04-21 | End: 2021-04-21

## 2021-04-21 RX ORDER — MIDAZOLAM HYDROCHLORIDE 1 MG/ML
INJECTION, SOLUTION INTRAMUSCULAR; INTRAVENOUS AS NEEDED
Status: DISCONTINUED | OUTPATIENT
Start: 2021-04-21 | End: 2021-04-21 | Stop reason: HOSPADM

## 2021-04-21 RX ORDER — ALBUTEROL SULFATE 90 UG/1
2 AEROSOL, METERED RESPIRATORY (INHALATION) 2 TIMES DAILY
Status: DISCONTINUED | OUTPATIENT
Start: 2021-04-21 | End: 2021-04-22

## 2021-04-21 RX ORDER — HEPARIN SODIUM 200 [USP'U]/100ML
INJECTION, SOLUTION INTRAVENOUS
Status: COMPLETED | OUTPATIENT
Start: 2021-04-21 | End: 2021-04-21

## 2021-04-21 RX ORDER — ACETAMINOPHEN 325 MG/1
650 TABLET ORAL
Status: DISCONTINUED | OUTPATIENT
Start: 2021-04-21 | End: 2021-04-26 | Stop reason: HOSPADM

## 2021-04-21 RX ORDER — SODIUM CHLORIDE 0.9 % (FLUSH) 0.9 %
5-40 SYRINGE (ML) INJECTION EVERY 8 HOURS
Status: DISCONTINUED | OUTPATIENT
Start: 2021-04-21 | End: 2021-04-21 | Stop reason: HOSPADM

## 2021-04-21 RX ORDER — HEPARIN SODIUM 1000 [USP'U]/ML
INJECTION, SOLUTION INTRAVENOUS; SUBCUTANEOUS AS NEEDED
Status: DISCONTINUED | OUTPATIENT
Start: 2021-04-21 | End: 2021-04-21 | Stop reason: HOSPADM

## 2021-04-21 RX ORDER — VERAPAMIL HYDROCHLORIDE 2.5 MG/ML
INJECTION, SOLUTION INTRAVENOUS AS NEEDED
Status: DISCONTINUED | OUTPATIENT
Start: 2021-04-21 | End: 2021-04-21 | Stop reason: HOSPADM

## 2021-04-21 RX ORDER — LIDOCAINE HYDROCHLORIDE 10 MG/ML
INJECTION INFILTRATION; PERINEURAL AS NEEDED
Status: DISCONTINUED | OUTPATIENT
Start: 2021-04-21 | End: 2021-04-21 | Stop reason: HOSPADM

## 2021-04-21 RX ORDER — INSULIN LISPRO 100 [IU]/ML
10 INJECTION, SOLUTION INTRAVENOUS; SUBCUTANEOUS ONCE
Status: DISCONTINUED | OUTPATIENT
Start: 2021-04-21 | End: 2021-04-21

## 2021-04-21 RX ORDER — FENTANYL CITRATE 50 UG/ML
INJECTION, SOLUTION INTRAMUSCULAR; INTRAVENOUS AS NEEDED
Status: DISCONTINUED | OUTPATIENT
Start: 2021-04-21 | End: 2021-04-21 | Stop reason: HOSPADM

## 2021-04-21 RX ORDER — SODIUM CHLORIDE 9 MG/ML
100 INJECTION, SOLUTION INTRAVENOUS CONTINUOUS
Status: DISCONTINUED | OUTPATIENT
Start: 2021-04-21 | End: 2021-04-21 | Stop reason: HOSPADM

## 2021-04-21 RX ADMIN — INSULIN LISPRO 4 UNITS: 100 INJECTION, SOLUTION INTRAVENOUS; SUBCUTANEOUS at 14:05

## 2021-04-21 RX ADMIN — IPRATROPIUM BROMIDE AND ALBUTEROL SULFATE 3 ML: .5; 3 SOLUTION RESPIRATORY (INHALATION) at 01:29

## 2021-04-21 RX ADMIN — ACETAMINOPHEN 650 MG: 325 TABLET, FILM COATED ORAL at 21:32

## 2021-04-21 RX ADMIN — FENOFIBRATE 145 MG: 145 TABLET ORAL at 09:12

## 2021-04-21 RX ADMIN — DULOXETINE HYDROCHLORIDE 60 MG: 60 CAPSULE, DELAYED RELEASE ORAL at 09:12

## 2021-04-21 RX ADMIN — CARVEDILOL 6.25 MG: 6.25 TABLET, FILM COATED ORAL at 18:52

## 2021-04-21 RX ADMIN — SUCRALFATE 1 G: 1 TABLET ORAL at 09:12

## 2021-04-21 RX ADMIN — ASPIRIN 81 MG: 81 TABLET, CHEWABLE ORAL at 09:12

## 2021-04-21 RX ADMIN — INSULIN GLARGINE 25 UNITS: 100 INJECTION, SOLUTION SUBCUTANEOUS at 14:05

## 2021-04-21 RX ADMIN — Medication 10 ML: at 14:00

## 2021-04-21 RX ADMIN — IPRATROPIUM BROMIDE AND ALBUTEROL SULFATE 3 ML: .5; 3 SOLUTION RESPIRATORY (INHALATION) at 05:32

## 2021-04-21 RX ADMIN — EZETIMIBE 10 MG: 10 TABLET ORAL at 21:28

## 2021-04-21 RX ADMIN — SUCRALFATE 1 G: 1 TABLET ORAL at 21:28

## 2021-04-21 RX ADMIN — SODIUM CHLORIDE 100 ML/HR: 9 INJECTION, SOLUTION INTRAVENOUS at 07:50

## 2021-04-21 RX ADMIN — SODIUM CHLORIDE 75 ML/HR: 9 INJECTION, SOLUTION INTRAVENOUS at 23:07

## 2021-04-21 RX ADMIN — Medication 10 ML: at 21:28

## 2021-04-21 RX ADMIN — SUCRALFATE 1 G: 1 TABLET ORAL at 18:52

## 2021-04-21 RX ADMIN — CARVEDILOL 6.25 MG: 6.25 TABLET, FILM COATED ORAL at 09:12

## 2021-04-21 RX ADMIN — INSULIN LISPRO 4 UNITS: 100 INJECTION, SOLUTION INTRAVENOUS; SUBCUTANEOUS at 12:10

## 2021-04-21 RX ADMIN — SUCRALFATE 1 G: 1 TABLET ORAL at 12:10

## 2021-04-21 RX ADMIN — ACETAMINOPHEN 650 MG: 325 TABLET, FILM COATED ORAL at 06:18

## 2021-04-21 NOTE — PROGRESS NOTES
TRANSFER - IN REPORT:    Verbal report received from Alba, RN(name) on [place]  being received from [facility](unit) for ordered procedure      Report consisted of patients Situation, Background, Assessment and   Recommendations(SBAR). Information from the following report(s) SBAR was reviewed with the receiving nurse. Opportunity for questions and clarification was provided. Assessment completed upon patients arrival to unit and care assumed.

## 2021-04-21 NOTE — PROGRESS NOTES
Spiritual Care Assessment/Progress Note  [facility]      NAME: Adrian Seaman      MRN: [id number]  AGE: 68 y.o.  SEX: female  Yarsani Affiliation: Spiritism   Language: English     4/21/2021     Total Time (in minutes): 10     Spiritual Assessment begun in [facility] [place] [facility] CV SERVICES UNIT through conversation with:         [x]Patient        [] Family    [] Friend(s)        Reason for Consult: [facility]     Spiritual beliefs: (Please include comment if needed)     [x] Identifies with a lefty tradition: Spiritism        [x] Supported by a lefty community: Farrah Sandoval           [] Claims no spiritual orientation:           [] Seeking spiritual identity:                [] Adheres to an individual form of spirituality:           [] Not able to assess:                           Identified resources for coping:      [x] Prayer                               [x] Music                  [] Guided Imagery     [x] Family/friends                 [] Pet visits     [] Devotional reading                         [] Unknown     [] Other:                                              Interventions offered during this visit: (See comments for more details)    Patient Interventions: Affirmation of lefty, Communion ([address]), Initial/Spiritual assessment, patient floor, Prayer (actual), Prayer (assurance of), Catharsis/review of pertinent events in supportive environment           Plan of Care:     [x] Support spiritual and/or cultural needs    [] Support AMD and/or advance care planning process      [] Support grieving process   [] Coordinate Rites and/or Rituals    [] Coordination with community clergy   [] No spiritual needs identified at this time   [] Detailed Plan of Care below (See Comments)  [] Make referral to Music Therapy  [] Make referral to Pet Therapy     [] Make referral to Addiction services  [] Make referral to [facility]  [] Make referral to Spiritual Care Partner  [] No future visits requested        [] Follow up upon further referrals     Comments: Mrs. April Howell was sitting on the edge of her bed. She is an engaging person who shared a review of her medical history. Her   eight years ago and she has moved to [facility] where there is no [employer] near her so [facility] has been her parish in the past.  Prayer and communion offered.     ZEYNEP Santiago, RN, ACSW, LCSW   Page:  [phone]

## 2021-04-21 NOTE — PROGRESS NOTES
1848: TRANSFER - IN REPORT:    Verbal report received from Sarabjit Choi RN(name) on Candeon  being received from ED(unit) for routine progression of care      Report consisted of patients Situation, Background, Assessment and   Recommendations(SBAR). Information from the following report(s) SBAR, Kardex, ED Summary, Intake/Output, MAR, Accordion and Recent Results was reviewed with the receiving nurse. Opportunity for questions and clarification was provided. 1930: Bedside shift change report given to MAXIMILIANO Willis (oncoming nurse) by Jorge Luis Carrington (offgoing nurse). Report included the following information SBAR, Kardex, Intake/Output, MAR, Accordion and Recent Results.      Patient not yet on unit from ED

## 2021-04-21 NOTE — PROGRESS NOTES
TRANSFER - IN REPORT:    Verbal report received from Mega Mc on Maycol Barron  being received from procedure for routine progression of care. Report consisted of patients Situation, Background, Assessment and Recommendations(SBAR). Information from the following report(s) Procedure Summary was reviewed with the receiving clinician. Opportunity for questions and clarification was provided. Assessment completed upon patients arrival to [address] [place] [address]. Cardiac Cath Lab Recovery Arrival Note:    Maycol Barron arrived to [facility] recovery area. Patient procedure= cardiac catheterization. Patient on cardiac monitor, non-invasive blood pressure, SPO2 monitor. On O2 @ 3 lpm via nasal cannula. IV  of 0.9% normal saline on pump at 100 ml/hr. Patient status doing well without problems. Patient is A&Ox 4. Patient reports no complaints. PROCEDURE SITE CHECK:    Procedure site:without any bleeding or hematoma, no pain/discomfort reported at procedure site. No change in patient status. Continue to monitor patient and status.

## 2021-04-21 NOTE — PROGRESS NOTES
Cardiac Cath Lab Procedure Area Arrival Note:    Maycol Barron arrived to Cardiac Cath Lab, Procedure Area. Patient identifiers verified with NAME and DATE OF BIRTH. Procedure verified with patient. Consent forms verified. Allergies verified. Patient informed of procedure and plan of care. Questions answered with review. Patient voiced understanding of procedure and plan of care. Patient on cardiac monitor, non-invasive blood pressure, SPO2 monitor. On  or O2 @ 2 lpm via NC.  IV of NS on pump at 75 ml/hr. Patient status doing well without problems. Patient is A&Ox 4. Patient reports current shortness of breath, no current chest pain. Patient medicated during procedure with orders obtained and verified by Dr. Ben Campa. Refer to patients Cardiac Cath Lab PROCEDURE REPORT for vital signs, assessment, status, and response during procedure, printed at end of case. Printed report on chart or scanned into chart.

## 2021-04-21 NOTE — PROGRESS NOTES
TRANSFER - OUT REPORT:    Verbal report given to RUBÉN Ely on Ina Murillo being transferred to [facility] for procedure recovery. Report consisted of patients Situation, Background, Assessment and   Recommendations(SBAR). Information from the following report(s) SBAR, Procedure Summary and MAR was reviewed with the receiving nurse. Opportunity for questions and clarification was provided.

## 2021-04-21 NOTE — PROGRESS NOTES
TRANSFER - OUT REPORT:    Verbal report given to Gaby (name) on Simona Sorensen  being transferred to [facility](unit) for routine progression of care       Report consisted of patients Situation, Background, Assessment and   Recommendations(SBAR). Information from the following report(s) Procedure Summary was reviewed with the receiving nurse. Lines:   Peripheral IV 04/20/21 Left Antecubital (Active)   Site Assessment Clean, dry, & intact 04/21/21 0746   Phlebitis Assessment 0 04/21/21 0746   Infiltration Assessment 0 04/21/21 0746   Dressing Status Clean, dry, & intact 04/21/21 0746   Dressing Type Transparent;Tape 04/21/21 0746   Hub Color/Line Status Pink; Infusing 04/21/21 0746   Action Taken Open ports on tubing capped 04/21/21 0746   Alcohol Cap Used Yes 04/21/21 0746        Opportunity for questions and clarification was provided.       Patient transported with:   Monitor  O2 @ 3 liters  Registered Nurse

## 2021-04-21 NOTE — PROGRESS NOTES
0730: Bedside shift change report given to Mohan Dave (oncoming nurse) by Alba VIERA (offgoing nurse). Report included the following information SBAR, Kardex, Intake/Output, MAR, Accordion, Recent Results and Cardiac Rhythm NSR.     0915: 3cc removed. 5 cc left. No bleeding or hematoma noted. Pulse palpable    0940: 3cc removed. 2 cc left. No bleeding or hematoma noted. Pulse palpable    1000: 2 cc removed. No air left in TR band. No bleeding or hematoma noted. Pulse palpable    1020: TR band removed. No bleeding or hematoma noted. Site cleansed with normal saline and gauze and transparent dressing. 1930: Bedside shift change report given to MAXIMILIANO Neil, (oncoming nurse) by Sade Spangler RN (offgoing nurse). Report included the following information SBAR, Kardex, Intake/Output, MAR, Accordion, Recent Results and Cardiac Rhythm NSR.

## 2021-04-21 NOTE — CONSULTS
[address]    Name:  Lelia Hewitt  MR#:  [id number]  :  1940  ACCOUNT #:  [de-identified]  DATE OF SERVICE:  2021      IN-HOSPITAL NEPHROLOGY CONSULTATION    REFERRING PHYSICIAN:  Bennett Zamorano MD    REASON FOR CONSULTATION:  Acute kidney injury and hypercalcemia. HISTORY OF PRESENT ILLNESS:  The patient is a delightful 72-year-old  woman who presented to the emergency room yesterday with complaints of right-sided chest pain. The patient is on telemetry now, and the cardiac workup is in progress. 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 The patient was not able to provide exactly the kind of diagnosis she had. She is aware that this is a genetic disorder that also affected her grandnephew who had the same surgery. The patient was followed up for a while by a nephrologist.  Her serum creatinine was \"going up and down,\" but it was stable so she stopped her visits with Nephrology. The patient is diabetic. She follows up with Dr. Bianca Cross. She was told by Dr. Bianca Cross that she will need removal of her parathyroid glands because of increased calcium. The patient does not recall whether she had a sestamibi test done on her neck. When I saw the patient, she felt quite well. She denies any current chest pain. PAST MEDICAL HISTORY:  1. COPD in a former smoker, she quit eight years ago, oxygen dependent at home, on CPAP. 2.  Diabetes mellitus. 3.  Obesity. 4.  Arthritis. 5.  Asthma. 6.  Coronary artery disease. 7.  Non-ST elevation MI.  8.  Nephrolithiasis. 9.  Skin cancer. 10.  Depression. 11.  Rheumatic fever as a child. PAST SURGICAL HISTORY:  1. Appendectomy. 2.  Cholecystectomy. 3.  Coronary stent placement. 4.  Hysterectomy. 5.  Lithotripsy. 6.  .  7. Tubal ligation. 8.  Removal of a foreign body from biliary tract. 9.  Multiple endoscopies. ALLERGIES:  ALLERGIC TO CRESTOR, CODEINE, LIPITOR, AND TETANUS VACCINE. MEDICATION LIST AT HOME:  Include:  1. Carafate 1 g four times daily. 2.  Zofran when needed. 3.  Azelastine nasal spray. 4.  Ventolin nebulizer. 5.  Pravachol 40.  6.  Tylenol when needed. 7.  Fenofibrate 160.  8.  Magnesium oxide. 9.  Omeprazole 20.  10.  Azithromycin. 11.  Insulin Lantus. 12.  Zetia 10. 13.  Amaryl 2 mg twice a day. 14.  Lisinopril 10 mg once a day. 15.  Cymbalta 60 once a day. 16.  Aspirin 81 once a day. 17.  Carvedilol 6.25 twice a day. 18.  Omega-3 fatty acids with fish oil 1 g once a day. FAMILY HISTORY:  Sister with osteoporosis and arthritis. Brother with diabetes and stroke. Mother with hyperlipidemia. Another sister with cancer and diabetes. Brother with heart disease and diabetes. SOCIAL HISTORY:  The patient is a former smoker, she quit eight years ago. She uses alcohol on social occasions. She lives independently. She is a full code. REVIEW OF SYSTEMS:  CONSTITUTIONAL:  No chills or fever. INTEGUMENT:  The patient has chronic rash and lesions of the skin of both forearms, not pruritus. HEENT:  No visual or auditory disturbances, but she has dry nostrils from the oxygen. NECK:  No stiffness or odynophagia. RESPIRATORY:  Chronic shortness of breath, the patient becomes particularly short of breath when her oxygen is removed. No cough or sputum production. No hemoptysis. CARDIOVASCULAR:  Positive for chest pain. No palpitations. GASTROINTESTINAL:  No nausea or vomiting. No diarrhea. ENDOCRINOLOGY:  No heat or cold intolerance. GENITOURINARY:  Denies any dysuric symptoms or frequency, urgency, gross hematuria, urinary incontinence, foamy urine. NEUROPSYCHIATRIC:  The patient is treated for depression. She denies any suicidal thoughts. No localized weakness.     PHYSICAL EXAMINATION:  GENERAL:  A pleasant, elderly white woman, who is awake, alert. She is a eager and willing historian. She is obese. VITAL SIGNS:  Blood pressure is 118/49, heart rate is 80, respirations 15, she is on 3 L nasal cannula, she is oxygenating at 96%. HEENT:  Head is normocephalic. The patient is wearing nasal cannula. NECK:  Supple with no increased JVP, carotid bruit, or thyromegaly. LUNGS:  With distant breath sounds with no wheezes, rales, or rhonchi. HEART:  With S1 and S2 with regular rate and rhythm. The patient has holosystolic murmur. ABDOMEN:  Soft, obese, nontender, and nondistended. No CVA tenderness. EXTREMITIES:  With no edema, cyanosis, or clubbing. INTEGUMENT:  Multiple excoriation marks are noted on both for arms. No skin breaks. Turgor is normal.  NEUROLOGIC:  Nonfocal.  The patient is awake, alert, oriented to self, place, and time. Her affect is appropriate. She has good insight. LABORATORY DATA:  White blood count 5.8, hemoglobin 12.2. Chemistry: Today, sodium is 141; potassium is 4.6; CO2 is 31; BUN is 21; creatinine is 1.24, it was 1.32 yesterday, in 11/2019, it was 0.9. Triglycerides are 200, cholesterol is 139, LDL is 65. IMPRESSION:  1. Acute kidney injury, mild, which appears to be episodic and after the patient had urology surgery for reimplantation of ureter. The patient's renal function is already improving. The etiology of the current acute kidney injury episode is not clear, but the patient was severely hyperglycemic, which may induce glucosuria and relative volume depletion. 2.  Atypical chest pain, Cardiology is managing. 3.  Hypercalcemia. Today, calcium is 10.7, yesterday it was only 9.9, two years ago it was 9.4. The patient describes proposed therapy with parathyroidectomy, which address usually primary hyperparathyroidism. The patient's calcium is not persistently high. 4.  Uncontrolled diabetes.     RECOMMENDATIONS:  1.  No need of extensive renal workup. The patient's renal function is already improving. She is aware of having episodes of increased serum creatinine, which were never serious enough to deserve special adjustment of her therapy. We will continue monitoring her electrolytes. 2.  Check PTH and obtain another calcium. I doubt if the patient has hyperparathyroidism considering that her calcium was quite normal on two previous occasions. 3.  History of hypertension. Blood pressure is well controlled. No need of adjustment of antihypertensive medications. 4.  No need of discontinuing of lisinopril. Thank you very much for the opportunity to be a part of this patient's care. Our service will follow up with you closely.       Tino Griffin MD      [id number]  D:  04/21/2021 11:48  T:  04/21/2021 16:17  JOB #:  [id number]  CC:  Won Howard MD

## 2021-04-21 NOTE — PROGRESS NOTES
[facility]                                                                                          Hospitalist Progress Note  Chang Dash MD  Answering service: [phone] from in house phone        Date of Service:  2021  NAME:  Ina Murillo  :  1940  MRN:  [id number]      Please note that this dictation was completed with [employer], the computer voice recognition software. Quite often unanticipated grammatical, syntax, homophones, and other interpretive errors are inadvertently transcribed by the computer software. Please disregard these errors. Please excuse any errors that have escaped final proofreading. Admission Summary:   Ina Murillo is a 68 y.o. female who presents with chest pain.     History was probably obtained from the patient, patient reports that she has extensive cardiac history and doctors with Dr. Sarahi Thomas, she reports that she has history of COPD and chronic respiratory failure and is on 3 L oxygen at baseline. Patient reports that she woke up this morning and had sudden onset of left-sided chest pain associated with diaphoresis and lightheadedness. Patient reports that she tried to go to the bathroom but felt extremely lightheaded, got concerned and called her children for help, patient was brought to the hospital for further management and evaluation. Patient reports that she is undergoing evaluation because at some point of time she was found to have hypercalcemia and therefore she is set up for parathyroidectomy needs per her endocrinologist.  Patient denies any other complaints or problems. .  Reports that she has been taking her medications on a regular basis       Interval history / Subjective:         Patient seen and examined by me   She later complained of some sob   Blood sugars has been running high in 300s today        Assessment & Plan:     Chest pain  Patient underwent cath this morning  Clean coronaries. Shortness of breath  Chronic hypoxic respiratory she has COPD and is on 3 L of oxygen at home  We will check D-dimer to medicine scan and Dopplers of the lower extremities    Diabetes mellitus type II  Started on Lantus and sliding scale  Accu-Cheks  Blood sugars running high in 300s today    Acute kidney injury on CKD  Patient did get contrast for the procedure  Nephrology to follow  Continue IV fluids for now  Lasix if more short of breath or fluid overloaded    Hyperlipidemia  Zetia and TriCor    Hypertension  Coreg    Depression  Cymbalta    Code status: full  DVT prophylaxis: SCD     Care Plan discussed with: Patient/Family  Anticipated Disposition: Home w/Family  Anticipated Discharge: Greater than 48 hours     Hospital Problems  Date Reviewed: 5/15/2020          Codes Class Noted POA    Chest pain ICD-10-CM: R07.9  ICD-9-CM: 786.50  4/20/2021 Unknown                Review of Systems:   A comprehensive review of systems was negative except for that written in the HPI. Vital Signs:    Last 24hrs VS reviewed since prior progress note. Most recent are:  Visit Vitals  /83 (BP 1 Location: Left arm, BP Patient Position: At rest)   Pulse 73   Temp 98.3 °F (36.8 °C)   Resp 20   Wt 86.2 kg (190 lb)   SpO2 95%   BMI 31.23 kg/m²         Intake/Output Summary (Last 24 hours) at 4/21/2021 1508  Last data filed at 4/21/2021 1000  Gross per 24 hour   Intake 521.67 ml   Output 0 ml   Net 521.67 ml        Physical Examination:     I had a face to face encounter with this patient and independently examined them on 04/21/21 as outlined below:        Constitutional:  Mild shortness of breath   ENT:  Oral mucosa moist, oropharynx benign. Resp:  Poor breath sounds bilaterally   CV:  Regular rhythm, normal rate, no murmurs, gallops, rubs    GI:  Soft, non distended, non tender.  normoactive bowel sounds, no hepatosplenomegaly     Musculoskeletal:  No edema, warm, 2+ pulses throughout    Neurologic: Moves all extremities. AAOx3, CN II-XII reviewed            Data Review:    Review and/or order of clinical lab test      Labs:     Recent Labs     04/21/21  0620 04/20/21  1238   WBC 5.8 5.3   HGB 12.2 13.3   HCT 37.4 39.3    154     Recent Labs     04/21/21  0620 04/20/21  1238    138   K 4.6 4.0    105   CO2 31 29   BUN 21* 21*   CREA 1.24* 1.32*   * 322*   CA 10.7* 9.9     Recent Labs     04/21/21  0620 04/20/21  1238   ALT 20 22   AP 64 76   TBILI 0.4 0.5   TP 6.2* 6.4   ALB 3.5 3.7   GLOB 2.7 2.7     No results for input(s): INR, PTP, APTT, INREXT in the last 72 hours. No results for input(s): FE, TIBC, PSAT, FERR in the last 72 hours. No results found for: FOL, RBCF   No results for input(s): PH, PCO2, PO2 in the last 72 hours.   Recent Labs     04/21/21  0620 04/20/21  1756 04/20/21  1238   TROIQ <0.05 <0.05 <0.05     Lab Results   Component Value Date/Time    Cholesterol, total 139 04/21/2021 06:20 AM    HDL Cholesterol 34 04/21/2021 06:20 AM    LDL,Direct 121 (H) 07/22/2015 04:00 AM    LDL, calculated 65 04/21/2021 06:20 AM    Triglyceride 200 (H) 04/21/2021 06:20 AM    CHOL/HDL Ratio 4.1 04/21/2021 06:20 AM     Lab Results   Component Value Date/Time    Glucose (POC) 312 (H) 04/21/2021 11:53 AM    Glucose (POC) 170 (H) 04/21/2021 06:25 AM    Glucose (POC) 180 (H) 04/20/2021 10:03 PM    Glucose (POC) 231 (H) 05/15/2020 12:41 PM    Glucose (POC) 238 (H) 05/15/2020 09:40 AM     Lab Results   Component Value Date/Time    Color YELLOW/STRAW 11/16/2019 07:03 PM    Appearance CLOUDY (A) 11/16/2019 07:03 PM    Specific gravity 1.012 11/16/2019 07:03 PM    pH (UA) 7.5 11/16/2019 07:03 PM    Protein NEGATIVE  11/16/2019 07:03 PM    Glucose 100 (A) 11/16/2019 07:03 PM    Ketone NEGATIVE  11/16/2019 07:03 PM    Bilirubin NEGATIVE  11/16/2019 07:03 PM    Urobilinogen 1.0 11/16/2019 07:03 PM    Nitrites NEGATIVE  11/16/2019 07:03 PM    Leukocyte Esterase NEGATIVE  11/16/2019 07:03 PM Epithelial cells FEW 11/16/2019 07:03 PM    Bacteria NEGATIVE  11/16/2019 07:03 PM    WBC 0-4 11/16/2019 07:03 PM    RBC 0-5 11/16/2019 07:03 PM         Medications Reviewed:     Current Facility-Administered Medications   Medication Dose Route Frequency    acetaminophen (TYLENOL) tablet 650 mg  650 mg Oral Q4H PRN    insulin glargine (LANTUS) injection 25 Units  25 Units SubCUTAneous DAILY    aspirin chewable tablet 81 mg  81 mg Oral DAILY    carvediloL (COREG) tablet 6.25 mg  6.25 mg Oral BID WITH MEALS    DULoxetine (CYMBALTA) capsule 60 mg  60 mg Oral DAILY    ezetimibe (ZETIA) tablet 10 mg  10 mg Oral QHS    fenofibrate nanocrystallized (TRICOR) tablet 145 mg  145 mg Oral DAILY    pantoprazole (PROTONIX) tablet 40 mg  40 mg Oral ACB    sucralfate (CARAFATE) tablet 1 g  1 g Oral QID    sodium chloride (NS) flush 5-40 mL  5-40 mL IntraVENous Q8H    sodium chloride (NS) flush 5-40 mL  5-40 mL IntraVENous PRN    0.9% sodium chloride infusion  75 mL/hr IntraVENous CONTINUOUS    nitroglycerin (NITROSTAT) tablet 0.4 mg  0.4 mg SubLINGual Q5MIN PRN    glucose chewable tablet 16 g  4 Tab Oral PRN    dextrose (D50W) injection syrg 12.5-25 g  25-50 mL IntraVENous PRN    glucagon (GLUCAGEN) injection 1 mg  1 mg IntraMUSCular PRN    insulin lispro (HUMALOG) injection   SubCUTAneous AC&HS    albuterol-ipratropium (DUO-NEB) 2.5 MG-0.5 MG/3 ML  3 mL Nebulization Q4H PRN     ______________________________________________________________________  EXPECTED LENGTH OF STAY: 2d 21h  ACTUAL LENGTH OF STAY:          1                 Renny Navarrete MD

## 2021-04-22 ENCOUNTER — APPOINTMENT (OUTPATIENT)
Dept: GENERAL RADIOLOGY | Age: 81
DRG: 286 | End: 2021-04-22
Attending: FAMILY MEDICINE
Payer: MEDICARE

## 2021-04-22 LAB
ALBUMIN SERPL-MCNC: 3.5 G/DL (ref 3.5–5)
ANION GAP SERPL CALC-SCNC: 4 MMOL/L (ref 5–15)
ANION GAP SERPL CALC-SCNC: 6 MMOL/L (ref 5–15)
BUN SERPL-MCNC: 20 MG/DL (ref 6–20)
BUN SERPL-MCNC: 21 MG/DL (ref 6–20)
BUN/CREAT SERPL: 16 (ref 12–20)
BUN/CREAT SERPL: 16 (ref 12–20)
CALCIUM SERPL-MCNC: 10 MG/DL (ref 8.5–10.1)
CALCIUM SERPL-MCNC: 10.1 MG/DL (ref 8.5–10.1)
CALCIUM SERPL-MCNC: 10.1 MG/DL (ref 8.5–10.1)
CHLORIDE SERPL-SCNC: 104 MMOL/L (ref 97–108)
CHLORIDE SERPL-SCNC: 105 MMOL/L (ref 97–108)
CO2 SERPL-SCNC: 27 MMOL/L (ref 21–32)
CO2 SERPL-SCNC: 27 MMOL/L (ref 21–32)
CREAT SERPL-MCNC: 1.28 MG/DL (ref 0.55–1.02)
CREAT SERPL-MCNC: 1.28 MG/DL (ref 0.55–1.02)
D DIMER PPP FEU-MCNC: 0.5 MG/L FEU (ref 0–0.65)
GLUCOSE BLD STRIP.AUTO-MCNC: 162 MG/DL (ref 65–100)
GLUCOSE BLD STRIP.AUTO-MCNC: 231 MG/DL (ref 65–100)
GLUCOSE BLD STRIP.AUTO-MCNC: 236 MG/DL (ref 65–100)
GLUCOSE BLD STRIP.AUTO-MCNC: 324 MG/DL (ref 65–100)
GLUCOSE SERPL-MCNC: 182 MG/DL (ref 65–100)
GLUCOSE SERPL-MCNC: 184 MG/DL (ref 65–100)
PHOSPHATE SERPL-MCNC: 2.4 MG/DL (ref 2.6–4.7)
POTASSIUM SERPL-SCNC: 4.2 MMOL/L (ref 3.5–5.1)
POTASSIUM SERPL-SCNC: 4.2 MMOL/L (ref 3.5–5.1)
PTH-INTACT SERPL-MCNC: 29.2 PG/ML (ref 18.4–88)
SERVICE CMNT-IMP: ABNORMAL
SODIUM SERPL-SCNC: 136 MMOL/L (ref 136–145)
SODIUM SERPL-SCNC: 137 MMOL/L (ref 136–145)

## 2021-04-22 PROCEDURE — 94760 N-INVAS EAR/PLS OXIMETRY 1: CPT

## 2021-04-22 PROCEDURE — 82962 GLUCOSE BLOOD TEST: CPT

## 2021-04-22 PROCEDURE — 80048 BASIC METABOLIC PNL TOTAL CA: CPT

## 2021-04-22 PROCEDURE — 71045 X-RAY EXAM CHEST 1 VIEW: CPT

## 2021-04-22 PROCEDURE — 74011250637 HC RX REV CODE- 250/637: Performed by: NURSE PRACTITIONER

## 2021-04-22 PROCEDURE — 83970 ASSAY OF PARATHORMONE: CPT

## 2021-04-22 PROCEDURE — 74011636637 HC RX REV CODE- 636/637: Performed by: FAMILY MEDICINE

## 2021-04-22 PROCEDURE — 97530 THERAPEUTIC ACTIVITIES: CPT

## 2021-04-22 PROCEDURE — 74011250637 HC RX REV CODE- 250/637: Performed by: FAMILY MEDICINE

## 2021-04-22 PROCEDURE — 65660000000 HC RM CCU STEPDOWN

## 2021-04-22 PROCEDURE — 94640 AIRWAY INHALATION TREATMENT: CPT

## 2021-04-22 PROCEDURE — 74011250637 HC RX REV CODE- 250/637: Performed by: INTERNAL MEDICINE

## 2021-04-22 PROCEDURE — 85379 FIBRIN DEGRADATION QUANT: CPT

## 2021-04-22 PROCEDURE — 74011636637 HC RX REV CODE- 636/637: Performed by: INTERNAL MEDICINE

## 2021-04-22 PROCEDURE — 97162 PT EVAL MOD COMPLEX 30 MIN: CPT

## 2021-04-22 PROCEDURE — 74011000250 HC RX REV CODE- 250: Performed by: FAMILY MEDICINE

## 2021-04-22 PROCEDURE — 94664 DEMO&/EVAL PT USE INHALER: CPT

## 2021-04-22 PROCEDURE — 77010033678 HC OXYGEN DAILY

## 2021-04-22 PROCEDURE — 36415 COLL VENOUS BLD VENIPUNCTURE: CPT

## 2021-04-22 PROCEDURE — 74011250636 HC RX REV CODE- 250/636: Performed by: FAMILY MEDICINE

## 2021-04-22 PROCEDURE — 80069 RENAL FUNCTION PANEL: CPT

## 2021-04-22 PROCEDURE — 97116 GAIT TRAINING THERAPY: CPT

## 2021-04-22 RX ORDER — CLONIDINE HYDROCHLORIDE 0.2 MG/1
0.2 TABLET ORAL
Status: COMPLETED | OUTPATIENT
Start: 2021-04-23 | End: 2021-04-22

## 2021-04-22 RX ORDER — OXYMETAZOLINE HCL 0.05 %
2 SPRAY, NON-AEROSOL (ML) NASAL 3 TIMES DAILY
Status: DISPENSED | OUTPATIENT
Start: 2021-04-22 | End: 2021-04-25

## 2021-04-22 RX ORDER — CARVEDILOL 12.5 MG/1
12.5 TABLET ORAL 2 TIMES DAILY WITH MEALS
Status: DISCONTINUED | OUTPATIENT
Start: 2021-04-22 | End: 2021-04-26 | Stop reason: HOSPADM

## 2021-04-22 RX ORDER — PREDNISONE 20 MG/1
40 TABLET ORAL
Status: DISCONTINUED | OUTPATIENT
Start: 2021-04-23 | End: 2021-04-23

## 2021-04-22 RX ORDER — IPRATROPIUM BROMIDE AND ALBUTEROL SULFATE 2.5; .5 MG/3ML; MG/3ML
3 SOLUTION RESPIRATORY (INHALATION)
Status: DISCONTINUED | OUTPATIENT
Start: 2021-04-22 | End: 2021-04-23

## 2021-04-22 RX ORDER — LORATADINE 10 MG/1
10 TABLET ORAL DAILY
Status: DISCONTINUED | OUTPATIENT
Start: 2021-04-23 | End: 2021-04-26 | Stop reason: HOSPADM

## 2021-04-22 RX ORDER — GUAIFENESIN 600 MG/1
600 TABLET, EXTENDED RELEASE ORAL EVERY 12 HOURS
Status: DISCONTINUED | OUTPATIENT
Start: 2021-04-22 | End: 2021-04-24

## 2021-04-22 RX ORDER — FENOFIBRATE 48 MG/1
48 TABLET, COATED ORAL DAILY
Status: DISCONTINUED | OUTPATIENT
Start: 2021-04-23 | End: 2021-04-26 | Stop reason: HOSPADM

## 2021-04-22 RX ADMIN — OXYMETAZOLINE HCL 2 SPRAY: 0.05 SPRAY NASAL at 15:32

## 2021-04-22 RX ADMIN — IPRATROPIUM BROMIDE AND ALBUTEROL SULFATE 3 ML: .5; 3 SOLUTION RESPIRATORY (INHALATION) at 11:36

## 2021-04-22 RX ADMIN — FENOFIBRATE 145 MG: 145 TABLET ORAL at 09:22

## 2021-04-22 RX ADMIN — SUCRALFATE 1 G: 1 TABLET ORAL at 12:57

## 2021-04-22 RX ADMIN — INSULIN GLARGINE 25 UNITS: 100 INJECTION, SOLUTION SUBCUTANEOUS at 09:23

## 2021-04-22 RX ADMIN — IPRATROPIUM BROMIDE AND ALBUTEROL SULFATE 3 ML: .5; 3 SOLUTION RESPIRATORY (INHALATION) at 21:32

## 2021-04-22 RX ADMIN — CLONIDINE HYDROCHLORIDE 0.2 MG: 0.2 TABLET ORAL at 23:52

## 2021-04-22 RX ADMIN — INSULIN LISPRO 2 UNITS: 100 INJECTION, SOLUTION INTRAVENOUS; SUBCUTANEOUS at 17:58

## 2021-04-22 RX ADMIN — SUCRALFATE 1 G: 1 TABLET ORAL at 17:58

## 2021-04-22 RX ADMIN — CARVEDILOL 12.5 MG: 12.5 TABLET, FILM COATED ORAL at 09:23

## 2021-04-22 RX ADMIN — ASPIRIN 81 MG: 81 TABLET, CHEWABLE ORAL at 09:23

## 2021-04-22 RX ADMIN — OXYMETAZOLINE HCL 2 SPRAY: 0.05 SPRAY NASAL at 21:28

## 2021-04-22 RX ADMIN — SUCRALFATE 1 G: 1 TABLET ORAL at 21:27

## 2021-04-22 RX ADMIN — Medication 10 ML: at 21:29

## 2021-04-22 RX ADMIN — Medication 10 ML: at 15:32

## 2021-04-22 RX ADMIN — Medication 10 ML: at 07:01

## 2021-04-22 RX ADMIN — IPRATROPIUM BROMIDE AND ALBUTEROL SULFATE 3 ML: .5; 3 SOLUTION RESPIRATORY (INHALATION) at 16:11

## 2021-04-22 RX ADMIN — EZETIMIBE 10 MG: 10 TABLET ORAL at 21:27

## 2021-04-22 RX ADMIN — INSULIN LISPRO 2 UNITS: 100 INJECTION, SOLUTION INTRAVENOUS; SUBCUTANEOUS at 12:57

## 2021-04-22 RX ADMIN — METHYLPREDNISOLONE SODIUM SUCCINATE 40 MG: 40 INJECTION, POWDER, FOR SOLUTION INTRAMUSCULAR; INTRAVENOUS at 15:32

## 2021-04-22 RX ADMIN — DULOXETINE HYDROCHLORIDE 60 MG: 60 CAPSULE, DELAYED RELEASE ORAL at 09:23

## 2021-04-22 RX ADMIN — INSULIN LISPRO 2 UNITS: 100 INJECTION, SOLUTION INTRAVENOUS; SUBCUTANEOUS at 07:01

## 2021-04-22 RX ADMIN — PANTOPRAZOLE SODIUM 40 MG: 40 TABLET, DELAYED RELEASE ORAL at 07:01

## 2021-04-22 RX ADMIN — CARVEDILOL 12.5 MG: 12.5 TABLET, FILM COATED ORAL at 17:58

## 2021-04-22 RX ADMIN — GUAIFENESIN 600 MG: 600 TABLET, EXTENDED RELEASE ORAL at 15:32

## 2021-04-22 RX ADMIN — INSULIN LISPRO 2 UNITS: 100 INJECTION, SOLUTION INTRAVENOUS; SUBCUTANEOUS at 21:27

## 2021-04-22 RX ADMIN — SUCRALFATE 1 G: 1 TABLET ORAL at 09:23

## 2021-04-22 RX ADMIN — ACETAMINOPHEN 650 MG: 325 TABLET, FILM COATED ORAL at 23:26

## 2021-04-22 RX ADMIN — GUAIFENESIN 600 MG: 600 TABLET, EXTENDED RELEASE ORAL at 21:27

## 2021-04-22 NOTE — PROGRESS NOTES
Rounded on Protestant patients and provided Anointing of the Sick and Communion  at request of patient. Fr. Kaity Ordaz.

## 2021-04-22 NOTE — PROGRESS NOTES
Reason for Admission:  Chest pain                     RUR Score:          13%           Plan for utilizing home health:    Referral to be sent to [facility] - INPATIENT      Transition of Care Plan:     The Plan for Transition of Care is related to the following treatment goals: Home Health    The Patient and/or patient representative was provided with a choice of provider and agrees  with the discharge plan. Yes [x] No []    A Freedom of choice list was provided with basic dialogue that supports the patient's individualized plan of care/goals and shares the quality data associated with the providers. Yes [x] No []      PCP: First and Last name:  Zackery Danielle MD     Name of Practice[facility]   Are you a current patient: Yes/No:  Yes   Approximate date of last visit:  NA   Can you participate in a virtual visit with your PCP: KOREY                    Current Advanced Directive/Advance Care Plan: Full Code      Healthcare Decision Maker:  MPOA: Martinez Lopez [phone] and Tony Veras  Click here to complete [address] including selection of the Healthcare Decision Maker Relationship (ie \"Primary\")                             Transition of Care Plan:    CM attempted phone call to patient and followed up with phone calls to martinez Hayes and Da Mantilla who assisted in the completion of initial CM assessment via conference call. Both Flaco Hayes and Da Mantilla are the primary family contacts for patient. Patient lives alone at [place], [place]. Patient does not drive, family assists with transportation needs. Patient has a hospital bed, rollator and uses 3 liters continuous home oxygen. Patient was discharged to [facility] in Nov. 2019 and has had home health before. Preferred pharmacy is [facility]. CM to monitor for any additional transitional care planning needs.      Ronla Novak, MS                  3:33 [facility] does not service patient area, new referral sent to [facility] via allscripts. CM to monitor. Chelsea Stanford, MS    4:00 [facility] has accepted patient for HHPT, AVS updated.      Israel Krueger MS

## 2021-04-22 NOTE — PROGRESS NOTES
Cardiology Progress Note                                        Admit Date: 4/20/2021    Assessment/Plan:     CAD; patent stents; there was a moderate lesion prior to distal Ramus stent; will treat this medically; she can see me at my office in one month  HTN; needs better control; will increase Coreg some more  COPD    Flor Reddy is a 68 y.o. female with     PROBLEM LIST:  Patient Active Problem List    Diagnosis Date Noted    Chest pain 04/20/2021    Dilation of common bile duct 11/16/2019    Dizziness and giddiness 07/21/2015    COPD exacerbation ([facility] [address] 01/01/2013    Depression 02/29/2012    Vitamin D deficiency 02/29/2012    Chronic low back pain 06/08/2011    DM (diabetes mellitus) ([address] 06/04/2010    Hyperlipidemia 06/04/2010    HTN (hypertension) 06/04/2010         Subjective:     Joann Dsouza reports none. Visit Vitals  BP (!) 153/61 (BP 1 Location: Right upper arm, BP Patient Position: At rest)   Pulse 83   Temp 98.3 °F (36.8 °C)   Resp 16   Wt 208 lb 1.8 oz (94.4 kg)   SpO2 95%   BMI 34.21 kg/m²       Intake/Output Summary (Last 24 hours) at 4/22/2021 0626  Last data filed at 4/22/2021 0349  Gross per 24 hour   Intake 1704.17 ml   Output 1150 ml   Net 554.17 ml       Objective:      Physical Exam:  HEENT: Perrla, EOMI  Neck: No JVD,  No thyroidmegaly  Resp: CTA bilaterally;  No wheezes or rales  CV: RRR s1s2 No murmur no s3  Abd:Soft, Nontender  Ext: No edema  Neuro: Alert and oriented; Nonfocal  Skin: Warm, Dry, Intact  Pulses: 2+ DP/PT/Rad      Telemetry: normal sinus rhythm    Current Facility-Administered Medications   Medication Dose Route Frequency    carvediloL (COREG) tablet 12.5 mg  12.5 mg Oral BID WITH MEALS    acetaminophen (TYLENOL) tablet 650 mg  650 mg Oral Q4H PRN    insulin glargine (LANTUS) injection 25 Units  25 Units SubCUTAneous DAILY    glycopyrrolate-formoterol (BEVESPI AEROSPHERE) 9 mcg-4.8 mcg inhaler (Patient Supplied)  2 Puff Inhalation BID    albuterol (PROVENTIL HFA, VENTOLIN HFA, PROAIR HFA) inhaler 2 Puff  2 Puff Inhalation BID    aspirin chewable tablet 81 mg  81 mg Oral DAILY    DULoxetine (CYMBALTA) capsule 60 mg  60 mg Oral DAILY    ezetimibe (ZETIA) tablet 10 mg  10 mg Oral QHS    fenofibrate nanocrystallized (TRICOR) tablet 145 mg  145 mg Oral DAILY    pantoprazole (PROTONIX) tablet 40 mg  40 mg Oral ACB    sucralfate (CARAFATE) tablet 1 g  1 g Oral QID    sodium chloride (NS) flush 5-40 mL  5-40 mL IntraVENous Q8H    sodium chloride (NS) flush 5-40 mL  5-40 mL IntraVENous PRN    0.9% sodium chloride infusion  75 mL/hr IntraVENous CONTINUOUS    nitroglycerin (NITROSTAT) tablet 0.4 mg  0.4 mg SubLINGual Q5MIN PRN    glucose chewable tablet 16 g  4 Tab Oral PRN    dextrose (D50W) injection syrg 12.5-25 g  25-50 mL IntraVENous PRN    glucagon (GLUCAGEN) injection 1 mg  1 mg IntraMUSCular PRN    insulin lispro (HUMALOG) injection   SubCUTAneous AC&HS    albuterol-ipratropium (DUO-NEB) 2.5 MG-0.5 MG/3 ML  3 mL Nebulization Q4H PRN         Data Review:   Labs:    Recent Results (from the past 24 hour(s))   GLUCOSE, POC    Collection Time: 04/21/21 11:53 AM   Result Value Ref Range    Glucose (POC) 312 (H) 65 - 100 mg/dL    Performed by [employer]    GLUCOSE, POC    Collection Time: 04/21/21  5:00 PM   Result Value Ref Range    Glucose (POC) 125 (H) 65 - 100 mg/dL    Performed by Gloria Arndt Bryant, POC    Collection Time: 04/21/21  9:22 PM   Result Value Ref Range    Glucose (POC) 167 (H) 65 - 100 mg/dL    Performed by Ross Quiroz    PTH INTACT    Collection Time: 04/22/21  3:52 AM   Result Value Ref Range    Calcium 10.1 8.5 - 10.1 MG/DL    PTH, Intact PENDING pg/mL   RENAL FUNCTION PANEL    Collection Time: 04/22/21  3:52 AM   Result Value Ref Range    Sodium 137 136 - 145 mmol/L    Potassium 4.2 3.5 - 5.1 mmol/L    Chloride 104 97 - 108 mmol/L    CO2 27 21 - 32 mmol/L    Anion gap 6 5 - 15 mmol/L    Glucose 184 (H) 65 - 100 mg/dL    BUN 21 (H) 6 - 20 MG/DL    Creatinine 1.28 (H) 0.55 - 1.02 MG/DL    BUN/Creatinine ratio 16 12 - 20      GFR est AA 49 (L) >60 ml/min/1.73m2    GFR est non-AA 40 (L) >60 ml/min/1.73m2    Calcium 10.0 8.5 - 10.1 MG/DL    Phosphorus 2.4 (L) 2.6 - 4.7 MG/DL    Albumin 3.5 3.5 - 5.0 g/dL   METABOLIC PANEL, BASIC    Collection Time: 04/22/21  3:52 AM   Result Value Ref Range    Sodium 136 136 - 145 mmol/L    Potassium 4.2 3.5 - 5.1 mmol/L    Chloride 105 97 - 108 mmol/L    CO2 27 21 - 32 mmol/L    Anion gap 4 (L) 5 - 15 mmol/L    Glucose 182 (H) 65 - 100 mg/dL    BUN 20 6 - 20 MG/DL    Creatinine 1.28 (H) 0.55 - 1.02 MG/DL    BUN/Creatinine ratio 16 12 - 20      GFR est AA 49 (L) >60 ml/min/1.73m2    GFR est non-AA 40 (L) >60 ml/min/1.73m2    Calcium 10.1 8.5 - 10.1 MG/DL

## 2021-04-22 NOTE — PROGRESS NOTES
Name: Chico Mantilla MRN: [id number]   : 1940 Hospital: [address]   Date: 2021        IMPRESSION:   · Mild RENA- stable. Patient had similar episodes in the past for many years. · H/O hypercalcemia- Ca is high normal. PTH is pending  · Chest pain- medical therapy was suggested. PLAN:   · No need for further renal evaluation. Patient seems stable. There is no plans for Card Cath  · Waiting for PTH. I doubt Primary hyperparathyroidism. · Renal panel in AM     Subjective/Interval History:   I have reviewed the flowsheet and previous days notes. ROS:A comprehensive review of systems was negative except for that written in the HPI. Objective:   Vital Signs:    Visit Vitals  /64   Pulse 81   Temp 98.3 °F (36.8 °C)   Resp 16   Ht 5' 5.39\" (1.661 m)   Wt 94.4 kg (208 lb 1.8 oz)   SpO2 96%   BMI 34.22 kg/m²       O2 Device: Nasal cannula   O2 Flow Rate (L/min): 3 l/min   Temp (24hrs), Av.3 °F (36.8 °C), Min:98.2 °F (36.8 °C), Max:98.3 °F (36.8 °C)       Intake/Output:   Last shift:      No intake/output data recorded. Last 3 shifts:  1901 -  0700  In: 1869.2 [P.O.:360; I.V.:1509.2]  Out: 1150 [Urine:1150]    Intake/Output Summary (Last 24 hours) at 2021 1151  Last data filed at 2021 0349  Gross per 24 hour   Intake 1347.5 ml   Output 1150 ml   Net 197.5 ml        Physical Exam:  General:    Alert, cooperative, no distress, appears stated age. Head:   Normocephalic, without obvious abnormality, atraumatic. Eyes:   Conjunctivae/corneas clear. Nose:  Nares normal. No drainage or sinus tenderness. Throat:    Lips, mucosa, and tongue normal.  No Thrush  Neck:  Supple, symmetrical,  no adenopathy, thyroid: non tender    no carotid bruit and no JVD. Lungs:   Clear to auscultation bilaterally. No Wheezing or Rhonchi. No rales. Chest wall:  No tenderness or deformity. No Accessory muscle use.   Heart:   Regular rate and rhythm,  no murmur, rub or gallop. Abdomen:   Soft, non-tender. Not distended. Bowel sounds normal. No masses  Extremities: Extremities normal, atraumatic, No cyanosis. No edema. No clubbing  Skin:     Texture, turgor normal. No rashes or lesions. Not Jaundiced  Psych:  Good insight. Not depressed. Anxious , not  agitated. Neurologic: Normal strength, Alert and oriented X 3. DATA:  Labs:  Recent Labs     04/22/21  0352 04/21/21  0620 04/20/21  1238     136 141 138   K 4.2  4.2 4.6 4.0     105 105 105   CO2 27  27 31 29   BUN 21*  20 21* 21*   CREA 1.28*  1.28* 1.24* 1.32*   CA 10.0  10.1  10.1 10.7* 9.9   ALB 3.5 3.5 3.7   PHOS 2.4*  --   --      Recent Labs     04/21/21  0620 04/20/21  1238   WBC 5.8 5.3   HGB 12.2 13.3   HCT 37.4 39.3    154     No results for input(s): INGRID, KU, CLU, CREAU in the last 72 hours.     No lab exists for component: PROU    Total time spent with patient:  35 minutes    [] Critical Care Provided    Care Plan discussed with:   Staff, Medical Team    Hina Hernandez MD

## 2021-04-22 NOTE — PROGRESS NOTES
Problem: Diabetes Self-Management  Goal: *Disease process and treatment process  Description: Define diabetes and identify own type of diabetes; list 3 options for treating diabetes. Outcome: Progressing Towards Goal  Goal: *Incorporating nutritional management into lifestyle  Description: Describe effect of type, amount and timing of food on blood glucose; list 3 methods for planning meals. Outcome: Progressing Towards Goal  Goal: *Incorporating physical activity into lifestyle  Description: State effect of exercise on blood glucose levels. Outcome: Progressing Towards Goal     Problem: Falls - Risk of  Goal: *Absence of Falls  Description: Document Swisher Pillow Fall Risk and appropriate interventions in the flowsheet.   Outcome: Progressing Towards Goal  Note: Fall Risk Interventions:  Mobility Interventions: Patient to call before getting OOB         Medication Interventions: Patient to call before getting OOB    Elimination Interventions: Call light in reach              Problem: Patient Education: Go to Patient Education Activity  Goal: Patient/Family Education  Outcome: Progressing Towards Goal

## 2021-04-22 NOTE — PROGRESS NOTES
2000 Report received from [place], [address] [place] [address]. Patient alert and oriented x4, resting in bed quietly. VSS, no needs expressed    7964 Bedside shift change report given to Samaria Platt by Linnea Cota RN. Report included the following information SBAR, Kardex, MAR, Accordion, and Recent Results.

## 2021-04-22 NOTE — PROGRESS NOTES
Problem: Mobility Impaired (Adult and Pediatric)  Goal: *Acute Goals and Plan of Care (Insert Text)  Description: FUNCTIONAL STATUS PRIOR TO ADMISSION: Patient was modified independent using a rollator for functional mobility. Completes all her own self-care, cooks, and drives. Notes decreased activity tolerance and SOB over last months. HOME SUPPORT PRIOR TO ADMISSION: The patient lived alone but has children locally to provide assistance as needed. Physical Therapy Goals  Initiated 4/22/2021  1. Patient will move from supine to sit and sit to supine , scoot up and down, and roll side to side in bed with modified independence within 7 day(s). 2.  Patient will transfer from bed to chair and chair to bed with modified independence using the least restrictive device within 7 day(s). 3.  Patient will perform sit to stand with modified independence within 7 day(s). 4.  Patient will ambulate with modified independence for 300 feet with the least restrictive device within 7 day(s). Outcome: Not Met   PHYSICAL THERAPY EVALUATION  Patient: Joshua Benavides (39 y.o. female)  Date: 4/22/2021  Primary Diagnosis: Chest pain [R07.9]  Procedure(s) (LRB):  LEFT HEART CATH / CORONARY ANGIOGRAPHY (N/A) 1 Day Post-Op   Precautions:          ASSESSMENT  Based on the objective data described below, the patient presents with decreased activity tolerance, hypertension, SOB, mild gait dysfunction s/p admission for chest pain. Patient in no distress and very talkative during session. Overall needing only SBA for bed mob, CGA for transfers and gait with RW in room (declined out of room at this time). No LOB or safety concerns with RW but increased forward flexion with fatigue. HR and SpO2 stable with activity but BP increased from 160s/70s to 185/90. Decreased back once in bed and resting x 5 minutes. At baseline, patient lives alone in her home and is mod indep with RW.   Children nearby help with cleaning and grocery delivery but patient does drive on occasion until began feeling more fatigued and SOB with activity of late. Anticipate will be able to d/c home with HHPT and increased assist from family for iADLs upon return home but will depend on any medical interventions that may occur. Current Level of Function Impacting Discharge (mobility/balance): SBA bed mob, CGA gait x 40' and transfers    Functional Outcome Measure: The patient scored Total Score: 20/28 on the Tinetti outcome measure which is indicative of moderate fall risk. Other factors to consider for discharge: lives alone, mod indep with rollator PTA     Patient will benefit from skilled therapy intervention to address the above noted impairments. PLAN :  Recommendations and Planned Interventions: bed mobility training, transfer training, gait training, therapeutic exercises, patient and family training/education, and therapeutic activities      Frequency/Duration: Patient will be followed by physical therapy:  3 times a week to address goals. Recommendation for discharge: (in order for the patient to meet his/her long term goals)  Physical therapy at least 2 days/week in the home     This discharge recommendation:  A follow-up discussion with the attending provider and/or case management is planned    IF patient discharges home will need the following DME: patient owns DME needed for d/c          SUBJECTIVE:   Patient stated I don't want to go into hallway yet, lets just walk in room.     OBJECTIVE DATA SUMMARY:   HISTORY:    Past Medical History:   Diagnosis Date    Anemia NEC     Arthritis     Asthma     CAD (coronary artery disease)     NSTEMI following PCI    Calculus of kidney 7-    [facility]    Cancer ([facility] [address]     skin    Chronic pain     degenerative disc disease, lower right back    COPD     Depression     Diabetes ([facility] [address]     GERD (gastroesophageal reflux disease)     Hx of mammogram 11/29/2017    [facility] - No mammographic evidence of malignancy - annual follow up recommended     Hypertension     2005 Dx    Joint pain     Morbid obesity ([address]     Psychiatric disorder     depression    Rheumatic fever     as a child    Sleep apnea     on CPAP    Sleep disorder      Past Surgical History:   Procedure Laterality Date    COLONOSCOPY N/A 7/30/2019    COLONOSCOPY performed by Nichole Gomes MD at [facility]COPY    COLONOSCOPY,REMV Daniel Graft  7/30/2019         HX APPENDECTOMY      in 29's    HX CHOLECYSTECTOMY      with stent placement    HX CORONARY STENT PLACEMENT  dec 2013    HX HYSTERECTOMY      HX LITHOTRIPSY      HX TRABECULECTOMY      HX TUBAL LIGATION      VT CARDIAC SURG PROCEDURE UNLIST      4 stents    VT ERCP REMOVE CALCULI/DEBRIS BILIARY/PANCREAS DUCT  5/15/2020         VT ERCP REMOVE FOREIGN BODY/STENT BILIARY/PANC DUCT  5/15/2020         VT ERCP W/SPHINCTEROTOMY/PAPILLOTOMY  5/15/2020         UPPER GI ENDOSCOPY,BIOPSY  7/30/2019         UPPER GI ENDOSCOPY,BIOPSY  5/15/2020            Personal factors and/or comorbidities impacting plan of care:     Home Situation  Home Environment: Private residence  Wheelchair Ramp: Yes  One/Two Story Residence: One story  Living Alone: Yes  Support Systems: Child(anastasia)  Patient Expects to be Discharged to[de-identified] Private residence  Current DME Used/Available at Home: [employer], straight, Walker, rollator, Commode, bedside, Grab bars  Tub or Shower Type: (walk in tub)    EXAMINATION/PRESENTATION/DECISION MAKING:   Critical Behavior:  Neurologic State: Alert  Orientation Level: Oriented X4  Cognition: Appropriate decision making, Appropriate safety awareness     Hearing:   Auditory  Auditory Impairment: None    Range Of Motion:  AROM: Generally decreased, functional           PROM: Generally decreased, functional           Strength:    Strength: Generally decreased, functional                    Tone & Sensation:   Tone: Normal              Sensation: Intact Coordination:  Coordination: Within functional limits  Vision:      Functional Mobility:  Bed Mobility:  Rolling: Stand-by assistance  Supine to Sit: Stand-by assistance  Sit to Supine: Stand-by assistance  Scooting: Stand-by assistance  Transfers:  Sit to Stand: Contact guard assistance  Stand to Sit: Contact guard assistance        Bed to Chair: Contact guard assistance              Balance:   Sitting: Intact  Standing: Impaired  Standing - Static: Good  Standing - Dynamic : Fair  Ambulation/Gait Training:  Distance (ft): 40 Feet (ft)  Assistive Device: Gait belt;Walker, rolling  Ambulation - Level of Assistance: Contact guard assistance        Gait Abnormalities: Decreased step clearance        Base of Support: Widened     Speed/Daylin: Pace decreased (<100 feet/min)  Step Length: Right shortened;Left shortened               Functional Measure:  Tinetti test:    Sitting Balance: 1  Arises: 1  Attempts to Rise: 2  Immediate Standing Balance: 1  Standing Balance: 1  Nudged: 2  Eyes Closed: 1  Turn 360 Degrees - Continuous/Discontinuous: 1  Turn 360 Degrees - Steady/Unsteady: 1  Sitting Down: 1  Balance Score: 12 Balance total score  Indication of Gait: 1  R Step Length/Height: 1  L Step Length/Height: 1  R Foot Clearance: 1  L Foot Clearance: 1  Step Symmetry: 1  Step Continuity: 1  Path: 1  Trunk: 0  Walking Time: 0  Gait Score: 8 Gait total score  Total Score: 20/28 Overall total score         Tinetti Tool Score Risk of Falls  <19 = High Fall Risk  19-24 = Moderate Fall Risk  25-28 = Low Fall Risk  Tinetti ME. Performance-Oriented Assessment of Mobility Problems in Elderly Patients. [facility]; [id number].  (Scoring Description: PT Bulletin Feb. 10, 1993)    Older adults: Dung Licea et al, 2009; n = [address] [facility] elderly evaluated with ABC, HIREN, ADL, and IADL)  · Mean HIREN score for males aged 69-68 years = 26.21(3.40)  · Mean HIREN score for females age 69-68 years = 25.16(4.30)  · Mean HIREN score for males over 80 years = 23.29(6.02)  · Mean HIREN score for females over 80 years = 17.20(8.32)        Physical Therapy Evaluation Charge Determination   History Examination Presentation Decision-Making   HIGH Complexity :3+ comorbidities / personal factors will impact the outcome/ POC  MEDIUM Complexity : 3 Standardized tests and measures addressing body structure, function, activity limitation and / or participation in recreation  MEDIUM Complexity : Evolving with changing characteristics  Other outcome measures Tinetti 20  MEDIUM      Based on the above components, the patient evaluation is determined to be of the following complexity level: MEDIUM    Pain Rating:  None reported    Activity Tolerance:   Fair and requires rest breaks      After treatment patient left in no apparent distress:   Supine in bed and Call bell within reach    COMMUNICATION/EDUCATION:   The patients plan of care was discussed with: Registered nurse. Fall prevention education was provided and the patient/caregiver indicated understanding. and Patient/family agree to work toward stated goals and plan of care.     Thank you for this referral.  Gogo Yeager, PT   Time Calculation: 39 mins

## 2021-04-23 LAB
ALBUMIN SERPL-MCNC: 3.8 G/DL (ref 3.5–5)
ANION GAP SERPL CALC-SCNC: 9 MMOL/L (ref 5–15)
BUN SERPL-MCNC: 20 MG/DL (ref 6–20)
BUN/CREAT SERPL: 14 (ref 12–20)
CALCIUM SERPL-MCNC: 10.1 MG/DL (ref 8.5–10.1)
CHLORIDE SERPL-SCNC: 98 MMOL/L (ref 97–108)
CO2 SERPL-SCNC: 25 MMOL/L (ref 21–32)
CREAT SERPL-MCNC: 1.39 MG/DL (ref 0.55–1.02)
GLUCOSE BLD STRIP.AUTO-MCNC: 266 MG/DL (ref 65–100)
GLUCOSE BLD STRIP.AUTO-MCNC: 294 MG/DL (ref 65–100)
GLUCOSE BLD STRIP.AUTO-MCNC: 350 MG/DL (ref 65–100)
GLUCOSE BLD STRIP.AUTO-MCNC: 379 MG/DL (ref 65–100)
GLUCOSE SERPL-MCNC: 432 MG/DL (ref 65–100)
PHOSPHATE SERPL-MCNC: 2.2 MG/DL (ref 2.6–4.7)
POTASSIUM SERPL-SCNC: 4.6 MMOL/L (ref 3.5–5.1)
SERVICE CMNT-IMP: ABNORMAL
SODIUM SERPL-SCNC: 132 MMOL/L (ref 136–145)

## 2021-04-23 PROCEDURE — 36415 COLL VENOUS BLD VENIPUNCTURE: CPT

## 2021-04-23 PROCEDURE — 65660000000 HC RM CCU STEPDOWN

## 2021-04-23 PROCEDURE — 74011250637 HC RX REV CODE- 250/637: Performed by: INTERNAL MEDICINE

## 2021-04-23 PROCEDURE — 74011636637 HC RX REV CODE- 636/637: Performed by: FAMILY MEDICINE

## 2021-04-23 PROCEDURE — 74011250637 HC RX REV CODE- 250/637: Performed by: FAMILY MEDICINE

## 2021-04-23 PROCEDURE — 80069 RENAL FUNCTION PANEL: CPT

## 2021-04-23 PROCEDURE — 74011000250 HC RX REV CODE- 250: Performed by: FAMILY MEDICINE

## 2021-04-23 PROCEDURE — 77010033678 HC OXYGEN DAILY

## 2021-04-23 PROCEDURE — 82962 GLUCOSE BLOOD TEST: CPT

## 2021-04-23 PROCEDURE — 94640 AIRWAY INHALATION TREATMENT: CPT

## 2021-04-23 PROCEDURE — 74011636637 HC RX REV CODE- 636/637: Performed by: INTERNAL MEDICINE

## 2021-04-23 RX ORDER — INSULIN GLARGINE 100 [IU]/ML
30 INJECTION, SOLUTION SUBCUTANEOUS DAILY
Status: DISCONTINUED | OUTPATIENT
Start: 2021-04-24 | End: 2021-04-26 | Stop reason: HOSPADM

## 2021-04-23 RX ORDER — INSULIN LISPRO 100 [IU]/ML
15 INJECTION, SOLUTION INTRAVENOUS; SUBCUTANEOUS ONCE
Status: COMPLETED | OUTPATIENT
Start: 2021-04-23 | End: 2021-04-23

## 2021-04-23 RX ORDER — IPRATROPIUM BROMIDE AND ALBUTEROL SULFATE 2.5; .5 MG/3ML; MG/3ML
3 SOLUTION RESPIRATORY (INHALATION)
Status: DISCONTINUED | OUTPATIENT
Start: 2021-04-24 | End: 2021-04-26

## 2021-04-23 RX ADMIN — INSULIN LISPRO 4 UNITS: 100 INJECTION, SOLUTION INTRAVENOUS; SUBCUTANEOUS at 12:51

## 2021-04-23 RX ADMIN — ASPIRIN 81 MG: 81 TABLET, CHEWABLE ORAL at 09:26

## 2021-04-23 RX ADMIN — GUAIFENESIN 600 MG: 600 TABLET, EXTENDED RELEASE ORAL at 22:08

## 2021-04-23 RX ADMIN — INSULIN LISPRO 2 UNITS: 100 INJECTION, SOLUTION INTRAVENOUS; SUBCUTANEOUS at 22:07

## 2021-04-23 RX ADMIN — Medication 10 ML: at 17:55

## 2021-04-23 RX ADMIN — SUCRALFATE 1 G: 1 TABLET ORAL at 17:54

## 2021-04-23 RX ADMIN — PREDNISONE 40 MG: 20 TABLET ORAL at 09:25

## 2021-04-23 RX ADMIN — GUAIFENESIN 600 MG: 600 TABLET, EXTENDED RELEASE ORAL at 09:25

## 2021-04-23 RX ADMIN — Medication 10 ML: at 23:13

## 2021-04-23 RX ADMIN — INSULIN LISPRO 15 UNITS: 100 INJECTION, SOLUTION INTRAVENOUS; SUBCUTANEOUS at 17:54

## 2021-04-23 RX ADMIN — IPRATROPIUM BROMIDE AND ALBUTEROL SULFATE 3 ML: .5; 3 SOLUTION RESPIRATORY (INHALATION) at 21:21

## 2021-04-23 RX ADMIN — LORATADINE 10 MG: 10 TABLET ORAL at 09:25

## 2021-04-23 RX ADMIN — FENOFIBRATE 48 MG: 48 TABLET, FILM COATED ORAL at 09:25

## 2021-04-23 RX ADMIN — Medication 10 ML: at 06:32

## 2021-04-23 RX ADMIN — SUCRALFATE 1 G: 1 TABLET ORAL at 09:25

## 2021-04-23 RX ADMIN — INSULIN LISPRO 3 UNITS: 100 INJECTION, SOLUTION INTRAVENOUS; SUBCUTANEOUS at 06:31

## 2021-04-23 RX ADMIN — OXYMETAZOLINE HCL 2 SPRAY: 0.05 SPRAY NASAL at 17:55

## 2021-04-23 RX ADMIN — INSULIN GLARGINE 25 UNITS: 100 INJECTION, SOLUTION SUBCUTANEOUS at 09:26

## 2021-04-23 RX ADMIN — CARVEDILOL 12.5 MG: 12.5 TABLET, FILM COATED ORAL at 17:54

## 2021-04-23 RX ADMIN — SUCRALFATE 1 G: 1 TABLET ORAL at 22:08

## 2021-04-23 RX ADMIN — IPRATROPIUM BROMIDE AND ALBUTEROL SULFATE 3 ML: .5; 3 SOLUTION RESPIRATORY (INHALATION) at 11:03

## 2021-04-23 RX ADMIN — OXYMETAZOLINE HCL 2 SPRAY: 0.05 SPRAY NASAL at 09:27

## 2021-04-23 RX ADMIN — SUCRALFATE 1 G: 1 TABLET ORAL at 12:51

## 2021-04-23 RX ADMIN — EZETIMIBE 10 MG: 10 TABLET ORAL at 22:08

## 2021-04-23 RX ADMIN — PANTOPRAZOLE SODIUM 40 MG: 40 TABLET, DELAYED RELEASE ORAL at 06:32

## 2021-04-23 RX ADMIN — CARVEDILOL 12.5 MG: 12.5 TABLET, FILM COATED ORAL at 09:25

## 2021-04-23 RX ADMIN — OXYMETAZOLINE HCL 2 SPRAY: 0.05 SPRAY NASAL at 22:11

## 2021-04-23 RX ADMIN — DULOXETINE HYDROCHLORIDE 60 MG: 60 CAPSULE, DELAYED RELEASE ORAL at 09:26

## 2021-04-23 NOTE — PROGRESS NOTES
8029- Perfect serve to HospitalistGaldino Shock: Patient's notify order for BP parameters is a systolic of 728 or better. Her BP is 183/75, nothing PRN. Want to give her something? Thanks!!    Order for one time of clonidine given and promptly administered    0730AM- Effective handoff given to [facility] for [address] [place] [address] [place].

## 2021-04-23 NOTE — PROGRESS NOTES
0730: Bedside shift change report given to 129 Natalia Dave (oncoming nurse) by Rashaad RN (offgoing nurse). Report included the following information SBAR, Kardex, Intake/Output, MAR, Accordion, Recent Results, and Cardiac Rhythm NSR .       1930: Bedside shift change report given to [facility] (oncoming nurse) by Nick Lucas RN (offgoing nurse). Report included the following information SBAR, Kardex, Intake/Output, MAR, Accordion, Recent Results, and Cardiac Rhythm NSR . Problem: Falls - Risk of  Goal: *Absence of Falls  Description: Document Alberta Arthurlisa Fall Risk and appropriate interventions in the flowsheet.   Outcome: Progressing Towards Goal  Note: Fall Risk Interventions:  Mobility Interventions: Patient to call before getting OOB, Communicate number of staff needed for ambulation/transfer         Medication Interventions: Patient to call before getting OOB, Teach patient to arise slowly    Elimination Interventions: Patient to call for help with toileting needs, Stay With Me (per policy), Call light in reach              Problem: Patient Education: Go to Patient Education Activity  Goal: Patient/Family Education  Outcome: Progressing Towards Goal     Problem: Discharge Planning  Goal: *Discharge to safe environment  Outcome: Progressing Towards Goal

## 2021-04-23 NOTE — PROGRESS NOTES
Patient declines Duone[facility] txs overnight. RT encouraged patient to call if she needs anything thru the night.

## 2021-04-23 NOTE — PROGRESS NOTES
Name: Alex Monae MRN: [id number]   : 1940 Hospital: . [address]   Date: 2021        IMPRESSION:   · Mild RENA- stable. Patient had similar episodes in the past for many years. · H/O hypercalcemia- Ca is high normal. PTH is normal. No evidence of primary hyperparathyroidism. · Chest pain- medical therapy was suggested. · Severe hyperglycemia with pseudonatremia      PLAN:   · No need for further renal evaluation. Patient seems stable. There is no plans for Card Cath  · No need for further evaluation for Primary Hyperparathyroidism. · Renal panel in AM     Subjective/Interval History:   I have reviewed the flowsheet and previous days notes. ROS:A comprehensive review of systems was negative except for that written in the HPI. Objective:   Vital Signs:    Visit Vitals  BP (!) 152/71 (BP 1 Location: Right upper arm, BP Patient Position: At rest)   Pulse 82   Temp 98.5 °F (36.9 °C)   Resp 21   Ht 5' 5.39\" (1.661 m)   Wt 93 kg (205 lb 0.4 oz)   SpO2 93%   BMI 33.71 kg/m²       O2 Device: Nasal cannula   O2 Flow Rate (L/min): 3 l/min   Temp (24hrs), Av.4 °F (36.9 °C), Min:98.1 °F (36.7 °C), Max:98.5 °F (36.9 °C)       Intake/Output:   Last shift:      No intake/output data recorded. Last 3 shifts:  1901 -  0700  In: 2067.5 [P.O.:840; I.V.:1227.5]  Out: 3350 [Urine:3350]    Intake/Output Summary (Last 24 hours) at 2021 1316  Last data filed at 2021 0606  Gross per 24 hour   Intake 240 ml   Output 2150 ml   Net -1910 ml        Physical Exam:  General:    Alert, cooperative, no distress, appears stated age. Head:   Normocephalic, without obvious abnormality, atraumatic. Eyes:   Conjunctivae/corneas clear. Nose:  Nares normal. No drainage or sinus tenderness. Throat:    Lips, mucosa, and tongue normal.  No Thrush  Neck:  Supple, symmetrical,  no adenopathy, thyroid: non tender    no carotid bruit and no JVD. Lungs:   Clear to auscultation bilaterally. No Wheezing or Rhonchi. No rales. Chest wall:  No tenderness or deformity. No Accessory muscle use. Heart:   Regular rate and rhythm,  no murmur, rub or gallop. Abdomen:   Soft, non-tender. Not distended. Bowel sounds normal. No masses  Extremities: Extremities normal, atraumatic, No cyanosis. No edema. No clubbing  Skin:     Texture, turgor normal. No rashes or lesions. Not Jaundiced  Psych:  Good insight. Not depressed. Anxious , not  agitated. Neurologic: Normal strength, Alert and oriented X 3. DATA:  Labs:  Recent Labs     04/23/21  0000 04/22/21  0352 04/21/21  0620   * 137  136 141   K 4.6 4.2  4.2 4.6   CL 98 104  105 105   CO2 25 27  27 31   BUN 20 21*  20 21*   CREA 1.39* 1.28*  1.28* 1.24*   CA 10.1 10.1  10.0  10.1 10.7*   ALB 3.8 3.5 3.5   PHOS 2.2* 2.4*  --      Recent Labs     04/21/21  0620   WBC 5.8   HGB 12.2   HCT 37.4        No results for input(s): INGRID, KU, CLU, CREAU in the last 72 hours.     No lab exists for component: PROU    Total time spent with patient:  35 minutes    [] Critical Care Provided    Care Plan discussed with:   Staff, Medical Team    Anastasia Diego MD

## 2021-04-24 LAB
ANION GAP SERPL CALC-SCNC: 4 MMOL/L (ref 5–15)
BUN SERPL-MCNC: 29 MG/DL (ref 6–20)
BUN/CREAT SERPL: 24 (ref 12–20)
CALCIUM SERPL-MCNC: 10.2 MG/DL (ref 8.5–10.1)
CHLORIDE SERPL-SCNC: 101 MMOL/L (ref 97–108)
CO2 SERPL-SCNC: 30 MMOL/L (ref 21–32)
CREAT SERPL-MCNC: 1.23 MG/DL (ref 0.55–1.02)
ERYTHROCYTE [DISTWIDTH] IN BLOOD BY AUTOMATED COUNT: 13.1 % (ref 11.5–14.5)
GLUCOSE BLD STRIP.AUTO-MCNC: 222 MG/DL (ref 65–100)
GLUCOSE BLD STRIP.AUTO-MCNC: 252 MG/DL (ref 65–100)
GLUCOSE BLD STRIP.AUTO-MCNC: 322 MG/DL (ref 65–100)
GLUCOSE BLD STRIP.AUTO-MCNC: 350 MG/DL (ref 65–100)
GLUCOSE SERPL-MCNC: 254 MG/DL (ref 65–100)
HCT VFR BLD AUTO: 36.4 % (ref 35–47)
HGB BLD-MCNC: 12.2 G/DL (ref 11.5–16)
MAGNESIUM SERPL-MCNC: 1.6 MG/DL (ref 1.6–2.4)
MCH RBC QN AUTO: 30.2 PG (ref 26–34)
MCHC RBC AUTO-ENTMCNC: 33.5 G/DL (ref 30–36.5)
MCV RBC AUTO: 90.1 FL (ref 80–99)
NRBC # BLD: 0 K/UL (ref 0–0.01)
NRBC BLD-RTO: 0 PER 100 WBC
PLATELET # BLD AUTO: 174 K/UL (ref 150–400)
PMV BLD AUTO: 9.6 FL (ref 8.9–12.9)
POTASSIUM SERPL-SCNC: 4 MMOL/L (ref 3.5–5.1)
RBC # BLD AUTO: 4.04 M/UL (ref 3.8–5.2)
SERVICE CMNT-IMP: ABNORMAL
SODIUM SERPL-SCNC: 135 MMOL/L (ref 136–145)
WBC # BLD AUTO: 6.6 K/UL (ref 3.6–11)

## 2021-04-24 PROCEDURE — 74011000250 HC RX REV CODE- 250: Performed by: FAMILY MEDICINE

## 2021-04-24 PROCEDURE — 74011250637 HC RX REV CODE- 250/637: Performed by: FAMILY MEDICINE

## 2021-04-24 PROCEDURE — 80048 BASIC METABOLIC PNL TOTAL CA: CPT

## 2021-04-24 PROCEDURE — 74011636637 HC RX REV CODE- 636/637: Performed by: FAMILY MEDICINE

## 2021-04-24 PROCEDURE — 36415 COLL VENOUS BLD VENIPUNCTURE: CPT

## 2021-04-24 PROCEDURE — 94640 AIRWAY INHALATION TREATMENT: CPT

## 2021-04-24 PROCEDURE — 82962 GLUCOSE BLOOD TEST: CPT

## 2021-04-24 PROCEDURE — 94664 DEMO&/EVAL PT USE INHALER: CPT

## 2021-04-24 PROCEDURE — 65660000000 HC RM CCU STEPDOWN

## 2021-04-24 PROCEDURE — 83735 ASSAY OF MAGNESIUM: CPT

## 2021-04-24 PROCEDURE — 74011250637 HC RX REV CODE- 250/637: Performed by: INTERNAL MEDICINE

## 2021-04-24 PROCEDURE — 85027 COMPLETE CBC AUTOMATED: CPT

## 2021-04-24 RX ORDER — INSULIN LISPRO 100 [IU]/ML
4 INJECTION, SOLUTION INTRAVENOUS; SUBCUTANEOUS
Status: DISCONTINUED | OUTPATIENT
Start: 2021-04-24 | End: 2021-04-25

## 2021-04-24 RX ORDER — GUAIFENESIN 600 MG/1
1200 TABLET, EXTENDED RELEASE ORAL EVERY 12 HOURS
Status: DISCONTINUED | OUTPATIENT
Start: 2021-04-24 | End: 2021-04-26 | Stop reason: HOSPADM

## 2021-04-24 RX ORDER — AMOXICILLIN 250 MG
1 CAPSULE ORAL 2 TIMES DAILY
Status: DISCONTINUED | OUTPATIENT
Start: 2021-04-24 | End: 2021-04-26 | Stop reason: HOSPADM

## 2021-04-24 RX ADMIN — LORATADINE 10 MG: 10 TABLET ORAL at 09:23

## 2021-04-24 RX ADMIN — GUAIFENESIN 1200 MG: 600 TABLET, EXTENDED RELEASE ORAL at 09:24

## 2021-04-24 RX ADMIN — CARVEDILOL 12.5 MG: 12.5 TABLET, FILM COATED ORAL at 17:03

## 2021-04-24 RX ADMIN — FENOFIBRATE 48 MG: 48 TABLET, FILM COATED ORAL at 09:24

## 2021-04-24 RX ADMIN — EZETIMIBE 10 MG: 10 TABLET ORAL at 21:16

## 2021-04-24 RX ADMIN — INSULIN LISPRO 8 UNITS: 100 INJECTION, SOLUTION INTRAVENOUS; SUBCUTANEOUS at 07:11

## 2021-04-24 RX ADMIN — IPRATROPIUM BROMIDE AND ALBUTEROL SULFATE 3 ML: .5; 3 SOLUTION RESPIRATORY (INHALATION) at 11:42

## 2021-04-24 RX ADMIN — DULOXETINE HYDROCHLORIDE 60 MG: 60 CAPSULE, DELAYED RELEASE ORAL at 09:23

## 2021-04-24 RX ADMIN — INSULIN LISPRO 6 UNITS: 100 INJECTION, SOLUTION INTRAVENOUS; SUBCUTANEOUS at 12:28

## 2021-04-24 RX ADMIN — PANTOPRAZOLE SODIUM 40 MG: 40 TABLET, DELAYED RELEASE ORAL at 07:05

## 2021-04-24 RX ADMIN — DOCUSATE SODIUM 50 MG AND SENNOSIDES 8.6 MG 1 TABLET: 8.6; 5 TABLET, FILM COATED ORAL at 17:03

## 2021-04-24 RX ADMIN — SUCRALFATE 1 G: 1 TABLET ORAL at 17:03

## 2021-04-24 RX ADMIN — INSULIN LISPRO 12 UNITS: 100 INJECTION, SOLUTION INTRAVENOUS; SUBCUTANEOUS at 17:02

## 2021-04-24 RX ADMIN — PREDNISONE 30 MG: 20 TABLET ORAL at 09:24

## 2021-04-24 RX ADMIN — INSULIN GLARGINE 30 UNITS: 100 INJECTION, SOLUTION SUBCUTANEOUS at 09:24

## 2021-04-24 RX ADMIN — CARVEDILOL 12.5 MG: 12.5 TABLET, FILM COATED ORAL at 09:24

## 2021-04-24 RX ADMIN — INSULIN LISPRO 4 UNITS: 100 INJECTION, SOLUTION INTRAVENOUS; SUBCUTANEOUS at 17:03

## 2021-04-24 RX ADMIN — INSULIN LISPRO 4 UNITS: 100 INJECTION, SOLUTION INTRAVENOUS; SUBCUTANEOUS at 21:34

## 2021-04-24 RX ADMIN — SUCRALFATE 1 G: 1 TABLET ORAL at 09:24

## 2021-04-24 RX ADMIN — IPRATROPIUM BROMIDE AND ALBUTEROL SULFATE 3 ML: .5; 3 SOLUTION RESPIRATORY (INHALATION) at 18:29

## 2021-04-24 RX ADMIN — Medication 10 ML: at 07:05

## 2021-04-24 RX ADMIN — ASPIRIN 81 MG: 81 TABLET, CHEWABLE ORAL at 09:24

## 2021-04-24 RX ADMIN — SUCRALFATE 1 G: 1 TABLET ORAL at 12:28

## 2021-04-24 RX ADMIN — SUCRALFATE 1 G: 1 TABLET ORAL at 21:16

## 2021-04-24 RX ADMIN — GUAIFENESIN 1200 MG: 600 TABLET, EXTENDED RELEASE ORAL at 21:16

## 2021-04-24 RX ADMIN — IPRATROPIUM BROMIDE AND ALBUTEROL SULFATE 3 ML: .5; 3 SOLUTION RESPIRATORY (INHALATION) at 21:26

## 2021-04-24 RX ADMIN — DOCUSATE SODIUM 50 MG AND SENNOSIDES 8.6 MG 1 TABLET: 8.6; 5 TABLET, FILM COATED ORAL at 12:28

## 2021-04-24 RX ADMIN — OXYMETAZOLINE HCL 2 SPRAY: 0.05 SPRAY NASAL at 15:54

## 2021-04-24 RX ADMIN — OXYMETAZOLINE HCL 2 SPRAY: 0.05 SPRAY NASAL at 21:17

## 2021-04-24 RX ADMIN — IPRATROPIUM BROMIDE AND ALBUTEROL SULFATE 3 ML: .5; 3 SOLUTION RESPIRATORY (INHALATION) at 08:52

## 2021-04-24 RX ADMIN — Medication 10 ML: at 12:30

## 2021-04-24 NOTE — PROGRESS NOTES
Name: Mona Steele MRN: [id number]   : 1940 Hospital: [facility] [address]   Date: 2021        IMPRESSION:   · Mild RENA- stable. Patient had similar episodes in the past for many years. · H/O hypercalcemia- Ca is high normal. PTH is normal. No evidence of primary hyperparathyroidism. · Chest pain- medical therapy was suggested. · Severe hyperglycemia with pseudonatremia      PLAN:   · No need for further renal evaluation. Patient seems stable. There is no plans for Card Cath  · No need for further evaluation for Primary Hyperparathyroidism. · Renal panel in AM  · Will see on Monday. if still hospitalized. Subjective/Interval History:   I have reviewed the flowsheet and previous days notes. ROS:A comprehensive review of systems was negative except for that written in the HPI. Objective:   Vital Signs:    Visit Vitals  BP (!) 127/56 (BP 1 Location: Left upper arm, BP Patient Position: Sitting)   Pulse 84   Temp 97.7 °F (36.5 °C)   Resp 22   Ht 5' 5.39\" (1.661 m)   Wt 92.2 kg (203 lb 4.2 oz)   SpO2 96%   BMI 33.42 kg/m²       O2 Device: Nasal cannula   O2 Flow Rate (L/min): 3 l/min   Temp (24hrs), Av.2 °F (36.8 °C), Min:97.7 °F (36.5 °C), Max:98.6 °F (37 °C)       Intake/Output:   Last shift:      No intake/output data recorded. Last 3 shifts:  1901 -  0700  In: -   Out: 2901 [Urine:2900]    Intake/Output Summary (Last 24 hours) at 2021 0959  Last data filed at 2021 1800  Gross per 24 hour   Intake    Output 1201 ml   Net -1201 ml        Physical Exam:  General:    Alert, cooperative, no distress, appears stated age. Head:   Normocephalic, without obvious abnormality, atraumatic. Eyes:   Conjunctivae/corneas clear. Nose:  Nares normal. No drainage or sinus tenderness. Throat:    Lips, mucosa, and tongue normal.  No Thrush  Neck:  Supple, symmetrical,  no adenopathy, thyroid: non tender    no carotid bruit and no JVD.   Lungs:   Clear to auscultation bilaterally. No Wheezing or Rhonchi. No rales. Chest wall:  No tenderness or deformity. No Accessory muscle use. Heart:   Regular rate and rhythm,  no murmur, rub or gallop. Abdomen:   Soft, non-tender. Not distended. Bowel sounds normal. No masses  Extremities: Extremities normal, atraumatic, No cyanosis. No edema. No clubbing  Skin:     Texture, turgor normal. No rashes or lesions. Not Jaundiced  Psych:  Good insight. Not depressed. Anxious , not  agitated. Neurologic: Normal strength, Alert and oriented X 3. DATA:  Labs:  Recent Labs     04/24/21  0502 04/23/21  0000 04/22/21  0352   * 132* 137  136   K 4.0 4.6 4.2  4.2    98 104  105   CO2 30 25 27  27   BUN 29* 20 21*  20   CREA 1.23* 1.39* 1.28*  1.28*   CA 10.2* 10.1 10.1  10.0  10.1   ALB  --  3.8 3.5   PHOS  --  2.2* 2.4*   MG 1.6  --   --      Recent Labs     04/24/21  0502   WBC 6.6   HGB 12.2   HCT 36.4        No results for input(s): INGRID, KU, CLU, CREAU in the last 72 hours.     No lab exists for component: PROU    Total time spent with patient:  35 minutes    [] Critical Care Provided    Care Plan discussed with:   Staff, Medical Team    Joanne Ferguson MD

## 2021-04-24 NOTE — PROGRESS NOTES
[facility] Adult  Hospitalist Group                                                                                          Hospitalist Progress Note  Nuzhat Maxwell MD  Answering service: [phone] OR [phone] from in house phone      NAME:  Rafat Justice  :  1940  MRN:  [id number]      Admission Summary:   Rafat Justice is a 68 y.o. female who presents with chest pain. patient reports that she has extensive cardiac history and doctors with Dr. Khanh Billingsley, she reports that she has history of COPD and chronic respiratory failure and is on 3 L oxygen at baseline. Patient reports that she woke up this morning and had sudden onset of left-sided chest pain associated with diaphoresis and lightheadedness. Patient reports that she tried to go to the bathroom but felt extremely lightheaded, got concerned and called her children for help, patient was brought to the hospital for further management and evaluation. Patient reports that she is undergoing evaluation because at some point of time she was found to have hypercalcemia and therefore she is set up for parathyroidectomy needs per her endocrinologist.  Patient denies any other complaints or problems. .  Reports that she has been taking her medications on a regular basis       Interval history / Subjective:       Patient seen and examined -  respiratory status improving  Pt reports hx of COPD and significant pollen exposure prior to admission   She still feels like her breathing is not quite back to normal- oxygen weaned back to baseline     Assessment & Plan:     CAD; cardiac cath done by Dr Umer Nash on 21  Findings=  patent stents; there was a moderate lesion prior to distal Ramus stent; plans for medical treatment- no interventions performed  Follow up with cardiology in office- 1 month    HTN; BP stable with  increased Coreg dose    Shortness of breath- worsening after admission  Acute on Chronic hypoxic respiratory failure-  Due to acute COPD exac-   baseline on 3 L of oxygen at home- post cath 4-6L  VQ scan was low probability for PE  and LE Dopplers neg for DVT  Started on steroids, CXR repeated and reviewed, added nebs, mucinex, claritin  Starting to wean steroids, breathing improved- plans for DC home tomorrow    Diabetes mellitus type II- hyperglycemia from steroids  Accu-Checks= cont Lantus and sliding scale  Increased insulin doses due to hyperglycemia  Likely will need to go home w/ short acting insulin    Elevated creatinine- initially thought to be RENA- but now likely progression to [facility]  Nephrology consulted for eval and tx recs  Creatinine stable but mildly elevated    Hyperlipidemia- Zetia and TriCor    Depression- Cymbalta    Code status: full  DVT prophylaxis: SCD     Care Plan discussed with: Patient/Family  Anticipated Disposition: Home w/Family  Anticipated Discharge: Greater than 48 hours     Hospital Problems  Date Reviewed: 5/15/2020          Codes Class Noted POA    Chest pain ICD-10-CM: R07.9  ICD-9-CM: 786.50  4/20/2021 Unknown                Review of Systems:   A comprehensive review of systems was negative except for that written in the HPI. Vital Signs:    Last 24hrs VS reviewed since prior progress note.  Most recent are:  Visit Vitals  BP (!) 142/85 (BP 1 Location: Right upper arm, BP Patient Position: At rest)   Pulse 69   Temp 98.6 °F (37 °C)   Resp 22   Ht 5' 5.39\" (1.661 m)   Wt 92.2 kg (203 lb 4.2 oz)   SpO2 98%   BMI 33.42 kg/m²     Patient Vitals for the past 24 hrs:   Temp Pulse Resp BP SpO2   04/24/21 1147 98.6 °F (37 °C) 69 22 (!) 142/85 98 %   04/24/21 0911 97.7 °F (36.5 °C) 84 22 (!) 127/56 96 %   04/24/21 0453 98.6 °F (37 °C) 71 16 (!) 134/94 99 %   04/23/21 2313 98.5 °F (36.9 °C) 73 16 118/68 96 %   04/23/21 2140     98 %   04/23/21 2124 98 °F (36.7 °C) 80 16 124/64 100 %   04/23/21 2121     100 %   04/23/21 1600  83          Intake/Output Summary (Last 24 hours) at 4/24/2021 1342  Last data filed at 4/23/2021 1800  Gross per 24 hour   Intake    Output 1201 ml   Net -1201 ml        Physical Examination:     I had a face to face encounter with this patient and independently examined them on 04/24/21 as outlined below:        Constitutional:  Mild shortness of breath   ENT:  Oral mucosa moist,     Resp:  decreased breath sounds bilaterally, wheezing   CV:  Regular rhythm, normal rate, no murmurs, gallops, rubs    GI:  Soft, non distended, non tender. normoactive bowel sounds, no hepatosplenomegaly     Musculoskeletal:  No edema, warm, 2+ pulses throughout    Neurologic:  Moves all extremities. AAOx3, CN II-XII reviewed       Data Review:    Review and/or order of clinical lab test  CXR Results                 04/22/21 1424  XR CHEST PORT Final result    Impression:  The lungs are deeply aerated. The lungs are clear acute process. Narrative:  EXAM: XR CHEST PORT       INDICATION: shortness of breath- hx of COPD on chronic oxygen       COMPARISON: 4/20/2021       FINDINGS: A portable AP radiograph of the chest was obtained at 1442 hours. The   patient is on a cardiac monitor. Heart size is normal. Lungs are deeply aerated. Lungs are clear of an acute process. Mild linear scarring is noted at the left   lung base. There is an azygos lobe and fissure                 Labs:     Recent Labs     04/24/21  0502   WBC 6.6   HGB 12.2   HCT 36.4        Recent Labs     04/24/21  0502 04/23/21  0000 04/22/21  0352   * 132* 137  136   K 4.0 4.6 4.2  4.2    98 104  105   CO2 30 25 27  27   BUN 29* 20 21*  20   CREA 1.23* 1.39* 1.28*  1.28*   * 432* 184*  182*   CA 10.2* 10.1 10.1  10.0  10.1   MG 1.6  --   --    PHOS  --  2.2* 2.4*     Recent Labs     04/23/21  0000 04/22/21  0352   ALB 3.8 3.5     No results for input(s): INR, PTP, APTT, INREXT, INREXT in the last 72 hours. No results for input(s): FE, TIBC, PSAT, FERR in the last 72 hours.    No results found for: FOL, RBCF   No results for input(s): PH, PCO2, PO2 in the last 72 hours. No results for input(s): CPK, CKNDX, TROIQ in the last 72 hours.     No lab exists for component: CPKMB  Lab Results   Component Value Date/Time    Cholesterol, total 139 04/21/2021 06:20 AM    HDL Cholesterol 34 04/21/2021 06:20 AM    LDL,Direct 121 (H) 07/22/2015 04:00 AM    LDL, calculated 65 04/21/2021 06:20 AM    Triglyceride 200 (H) 04/21/2021 06:20 AM    CHOL/HDL Ratio 4.1 04/21/2021 06:20 AM     Lab Results   Component Value Date/Time    Glucose (POC) 222 (H) 04/24/2021 11:47 AM    Glucose (POC) 252 (H) 04/24/2021 07:07 AM    Glucose (POC) 266 (H) 04/23/2021 09:23 PM    Glucose (POC) 379 (H) 04/23/2021 04:20 PM    Glucose (POC) 350 (H) 04/23/2021 12:13 PM     Lab Results   Component Value Date/Time    Color YELLOW/STRAW 11/16/2019 07:03 PM    Appearance CLOUDY (A) 11/16/2019 07:03 PM    Specific gravity 1.012 11/16/2019 07:03 PM    pH (UA) 7.5 11/16/2019 07:03 PM    Protein NEGATIVE  11/16/2019 07:03 PM    Glucose 100 (A) 11/16/2019 07:03 PM    Ketone NEGATIVE  11/16/2019 07:03 PM    Bilirubin NEGATIVE  11/16/2019 07:03 PM    Urobilinogen 1.0 11/16/2019 07:03 PM    Nitrites NEGATIVE  11/16/2019 07:03 PM    Leukocyte Esterase NEGATIVE  11/16/2019 07:03 PM    Epithelial cells FEW 11/16/2019 07:03 PM    Bacteria NEGATIVE  11/16/2019 07:03 PM    WBC 0-4 11/16/2019 07:03 PM    RBC 0-5 11/16/2019 07:03 PM         Medications Reviewed:     Current Facility-Administered Medications   Medication Dose Route Frequency    guaiFENesin ER (MUCINEX) tablet 1,200 mg  1,200 mg Oral Q12H    senna-docusate (PERICOLACE) 8.6-50 mg per tablet 1 Tab  1 Tab Oral BID    predniSONE (DELTASONE) tablet 30 mg  30 mg Oral DAILY WITH BREAKFAST    insulin glargine (LANTUS) injection 30 Units  30 Units SubCUTAneous DAILY    albuterol-ipratropium (DUO-NEB) 2.5 MG-0.5 MG/3 ML  3 mL Nebulization QID RT    carvediloL (COREG) tablet 12.5 mg  12.5 mg Oral BID WITH MEALS    fenofibrate nanocrystallized (TRICOR) tablet 48 mg  48 mg Oral DAILY    loratadine (CLARITIN) tablet 10 mg  10 mg Oral DAILY    oxymetazoline (AFRIN) 0.05 % nasal spray 2 Spray  2 Spray Both Nostrils TID    acetaminophen (TYLENOL) tablet 650 mg  650 mg Oral Q4H PRN    glycopyrrolate-formoterol (BEVESPI AEROSPHERE) 9 mcg-4.8 mcg inhaler (Patient Supplied)  2 Puff Inhalation BID    aspirin chewable tablet 81 mg  81 mg Oral DAILY    DULoxetine (CYMBALTA) capsule 60 mg  60 mg Oral DAILY    ezetimibe (ZETIA) tablet 10 mg  10 mg Oral QHS    pantoprazole (PROTONIX) tablet 40 mg  40 mg Oral ACB    sucralfate (CARAFATE) tablet 1 g  1 g Oral QID    sodium chloride (NS) flush 5-40 mL  5-40 mL IntraVENous Q8H    sodium chloride (NS) flush 5-40 mL  5-40 mL IntraVENous PRN    nitroglycerin (NITROSTAT) tablet 0.4 mg  0.4 mg SubLINGual Q5MIN PRN    glucose chewable tablet 16 g  4 Tab Oral PRN    dextrose (D50W) injection syrg 12.5-25 g  25-50 mL IntraVENous PRN    glucagon (GLUCAGEN) injection 1 mg  1 mg IntraMUSCular PRN    insulin lispro (HUMALOG) injection   SubCUTAneous AC&HS     ______________________________________________________________________  EXPECTED LENGTH OF STAY: 2d 21h  ACTUAL LENGTH OF STAY:          4                 Berenice Navarro MD

## 2021-04-24 NOTE — PROGRESS NOTES
[facility] Adult  Hospitalist Group                                                                                          Hospitalist Progress Note  Ozzie Dominguez MD  Answering service: [phone] OR [phone] from in house phone      NAME:  Antonia Tang  :  1940  MRN:  [id number]      Admission Summary:   Antonia Tang is a 68 y.o. female who presents with chest pain. patient reports that she has extensive cardiac history and doctors with Dr. Armida Valencia, she reports that she has history of COPD and chronic respiratory failure and is on 3 L oxygen at baseline. Patient reports that she woke up this morning and had sudden onset of left-sided chest pain associated with diaphoresis and lightheadedness. Patient reports that she tried to go to the bathroom but felt extremely lightheaded, got concerned and called her children for help, patient was brought to the hospital for further management and evaluation. Patient reports that she is undergoing evaluation because at some point of time she was found to have hypercalcemia and therefore she is set up for parathyroidectomy needs per her endocrinologist.  Patient denies any other complaints or problems. .  Reports that she has been taking her medications on a regular basis       Interval history / Subjective:       Patient seen and examined - nursing reporting respiratory distress after cardiac cath  Pt reports hx of COPD and significant pollen exposure prior to admission      Assessment & Plan:     CAD; cardiac cath done by Dr Vanesa Montgomery on 21  Findings=  patent stents; there was a moderate lesion prior to distal Ramus stent; plans for medical treatment- no interventions performed  Follow up with cardiology in office- 1 month    HTN; BP elevated-  increased Coreg     Shortness of breath- worsening after admission  Acute on Chronic hypoxic respiratory failure-  Due to acute COPD exac-   baseline on 3 L of oxygen at home- post cath 4-6L  VQ scan was low probability for PE  and LE Dopplers neg for DVT  Started on steroids, CXR repeated and reviewed, added nebs, mucinex, claritin    Diabetes mellitus type II- hyperglycemia from steroids  Accu-Checks= cont Lantus and sliding scale  Increased insulin doses    Acute kidney injury on CKD3  Nephrology consulted for eval and tx recs  Creatinine stable    Hyperlipidemia- Zetia and TriCor    Depression- Cymbalta    Code status: full  DVT prophylaxis: SCD     Care Plan discussed with: Patient/Family  Anticipated Disposition: Home w/Family  Anticipated Discharge: Greater than 48 hours     Hospital Problems  Date Reviewed: 5/15/2020          Codes Class Noted POA    Chest pain ICD-10-CM: R07.9  ICD-9-CM: 786.50  4/20/2021 Unknown                Review of Systems:   A comprehensive review of systems was negative except for that written in the HPI. Vital Signs:    Last 24hrs VS reviewed since prior progress note. Most recent are:  Visit Vitals  /68 (BP 1 Location: Right upper arm, BP Patient Position: At rest)   Pulse 73   Temp 98.5 °F (36.9 °C)   Resp 16   Ht 5' 5.39\" (1.661 m)   Wt 93 kg (205 lb 0.4 oz)   SpO2 96%   BMI 33.71 kg/m²         Intake/Output Summary (Last 24 hours) at 4/23/2021 2353  Last data filed at 4/23/2021 1800  Gross per 24 hour   Intake    Output 2901 ml   Net -2901 ml        Physical Examination:     I had a face to face encounter with this patient and independently examined them on 04/23/21 as outlined below:        Constitutional:  Mild shortness of breath   ENT:  Oral mucosa moist,     Resp:  decreased breath sounds bilaterally, wheezing   CV:  Regular rhythm, normal rate, no murmurs, gallops, rubs    GI:  Soft, non distended, non tender. normoactive bowel sounds, no hepatosplenomegaly     Musculoskeletal:  No edema, warm, 2+ pulses throughout    Neurologic:  Moves all extremities.   AAOx3, CN II-XII reviewed       Data Review:    Review and/or order of clinical lab test  CXR Results (Last 48 hours)               04/22/21 1424  XR CHEST PORT Final result    Impression:  The lungs are deeply aerated. The lungs are clear acute process. Narrative:  EXAM: XR CHEST PORT       INDICATION: shortness of breath- hx of COPD on chronic oxygen       COMPARISON: 4/20/2021       FINDINGS: A portable AP radiograph of the chest was obtained at 1442 hours. The   patient is on a cardiac monitor. Heart size is normal. Lungs are deeply aerated. Lungs are clear of an acute process. Mild linear scarring is noted at the left   lung base. There is an azygos lobe and fissure                 Labs:     Recent Labs     04/21/21 0620   WBC 5.8   HGB 12.2   HCT 37.4        Recent Labs     04/23/21  0000 04/22/21  0352 04/21/21  0620   * 137  136 141   K 4.6 4.2  4.2 4.6   CL 98 104  105 105   CO2 25 27  27 31   BUN 20 21*  20 21*   CREA 1.39* 1.28*  1.28* 1.24*   * 184*  182* 166*   CA 10.1 10.1  10.0  10.1 10.7*   PHOS 2.2* 2.4*  --      Recent Labs     04/23/21  0000 04/22/21  0352 04/21/21  0620   ALT  --   --  20   AP  --   --  64   TBILI  --   --  0.4   TP  --   --  6.2*   ALB 3.8 3.5 3.5   GLOB  --   --  2.7     No results for input(s): INR, PTP, APTT, INREXT, INREXT in the last 72 hours. No results for input(s): FE, TIBC, PSAT, FERR in the last 72 hours. No results found for: FOL, RBCF   No results for input(s): PH, PCO2, PO2 in the last 72 hours.   Recent Labs     04/21/21 0620   TROIQ <0.05     Lab Results   Component Value Date/Time    Cholesterol, total 139 04/21/2021 06:20 AM    HDL Cholesterol 34 04/21/2021 06:20 AM    LDL,Direct 121 (H) 07/22/2015 04:00 AM    LDL, calculated 65 04/21/2021 06:20 AM    Triglyceride 200 (H) 04/21/2021 06:20 AM    CHOL/HDL Ratio 4.1 04/21/2021 06:20 AM     Lab Results   Component Value Date/Time    Glucose (POC) 266 (H) 04/23/2021 09:23 PM    Glucose (POC) 379 (H) 04/23/2021 04:20 PM    Glucose (POC) 350 (H) 04/23/2021 12:13 PM    Glucose (POC) 294 (H) 04/23/2021 06:19 AM    Glucose (POC) 324 (H) 04/22/2021 08:27 PM     Lab Results   Component Value Date/Time    Color YELLOW/STRAW 11/16/2019 07:03 PM    Appearance CLOUDY (A) 11/16/2019 07:03 PM    Specific gravity 1.012 11/16/2019 07:03 PM    pH (UA) 7.5 11/16/2019 07:03 PM    Protein NEGATIVE  11/16/2019 07:03 PM    Glucose 100 (A) 11/16/2019 07:03 PM    Ketone NEGATIVE  11/16/2019 07:03 PM    Bilirubin NEGATIVE  11/16/2019 07:03 PM    Urobilinogen 1.0 11/16/2019 07:03 PM    Nitrites NEGATIVE  11/16/2019 07:03 PM    Leukocyte Esterase NEGATIVE  11/16/2019 07:03 PM    Epithelial cells FEW 11/16/2019 07:03 PM    Bacteria NEGATIVE  11/16/2019 07:03 PM    WBC 0-4 11/16/2019 07:03 PM    RBC 0-5 11/16/2019 07:03 PM         Medications Reviewed:     Current Facility-Administered Medications   Medication Dose Route Frequency    [START ON 4/24/2021] predniSONE (DELTASONE) tablet 30 mg  30 mg Oral DAILY WITH BREAKFAST    [START ON 4/24/2021] insulin glargine (LANTUS) injection 30 Units  30 Units SubCUTAneous DAILY    [START ON 4/24/2021] albuterol-ipratropium (DUO-NEB) 2.5 MG-0.5 MG/3 ML  3 mL Nebulization QID RT    carvediloL (COREG) tablet 12.5 mg  12.5 mg Oral BID WITH MEALS    fenofibrate nanocrystallized (TRICOR) tablet 48 mg  48 mg Oral DAILY    guaiFENesin ER (MUCINEX) tablet 600 mg  600 mg Oral Q12H    loratadine (CLARITIN) tablet 10 mg  10 mg Oral DAILY    oxymetazoline (AFRIN) 0.05 % nasal spray 2 Spray  2 Spray Both Nostrils TID    acetaminophen (TYLENOL) tablet 650 mg  650 mg Oral Q4H PRN    glycopyrrolate-formoterol (BEVESPI AEROSPHERE) 9 mcg-4.8 mcg inhaler (Patient Supplied)  2 Puff Inhalation BID    aspirin chewable tablet 81 mg  81 mg Oral DAILY    DULoxetine (CYMBALTA) capsule 60 mg  60 mg Oral DAILY    ezetimibe (ZETIA) tablet 10 mg  10 mg Oral QHS    pantoprazole (PROTONIX) tablet 40 mg  40 mg Oral ACB    sucralfate (CARAFATE) tablet 1 g  1 g Oral QID    sodium chloride (NS) flush 5-40 mL  5-40 mL IntraVENous Q8H    sodium chloride (NS) flush 5-40 mL  5-40 mL IntraVENous PRN    nitroglycerin (NITROSTAT) tablet 0.4 mg  0.4 mg SubLINGual Q5MIN PRN    glucose chewable tablet 16 g  4 Tab Oral PRN    dextrose (D50W) injection syrg 12.5-25 g  25-50 mL IntraVENous PRN    glucagon (GLUCAGEN) injection 1 mg  1 mg IntraMUSCular PRN    insulin lispro (HUMALOG) injection   SubCUTAneous AC&HS     ______________________________________________________________________  EXPECTED LENGTH OF STAY: 2d 21h  ACTUAL LENGTH OF STAY:          3                 Nuzhat Maxwell MD

## 2021-04-24 NOTE — PROGRESS NOTES
Problem: Diabetes Self-Management  Goal: *Disease process and treatment process  Description: Define diabetes and identify own type of diabetes; list 3 options for treating diabetes. Outcome: Progressing Towards Goal  Goal: *Incorporating nutritional management into lifestyle  Description: Describe effect of type, amount and timing of food on blood glucose; list 3 methods for planning meals. Outcome: Progressing Towards Goal  Goal: *Incorporating physical activity into lifestyle  Description: State effect of exercise on blood glucose levels. Outcome: Progressing Towards Goal  Goal: *Developing strategies to promote health/change behavior  Description: Define the ABC's of diabetes; identify appropriate screenings, schedule and personal plan for screenings. Outcome: Progressing Towards Goal  Goal: *Using medications safely  Description: State effect of diabetes medications on diabetes; name diabetes medication taking, action and side effects. Outcome: Progressing Towards Goal  Goal: *Monitoring blood glucose, interpreting and using results  Description: Identify recommended blood glucose targets  and personal targets. Outcome: Progressing Towards Goal  Goal: *Prevention, detection, treatment of acute complications  Description: List symptoms of hyper- and hypoglycemia; describe how to treat low blood sugar and actions for lowering  high blood glucose level. Outcome: Progressing Towards Goal  Goal: *Prevention, detection and treatment of chronic complications  Description: Define the natural course of diabetes and describe the relationship of blood glucose levels to long term complications of diabetes.   Outcome: Progressing Towards Goal  Goal: *Developing strategies to address psychosocial issues  Description: Describe feelings about living with diabetes; identify support needed and support network  Outcome: Progressing Towards Goal  Goal: *Insulin pump training  Outcome: Progressing Towards Goal  Goal: *Sick day guidelines  Outcome: Progressing Towards Goal  Goal: *Patient Specific Goal (EDIT GOAL, INSERT TEXT)  Outcome: Progressing Towards Goal     Problem: Patient Education: Go to Patient Education Activity  Goal: Patient/Family Education  Outcome: Progressing Towards Goal     Problem: Falls - Risk of  Goal: *Absence of Falls  Description: Document Guy Pitt Fall Risk and appropriate interventions in the flowsheet.   Outcome: Progressing Towards Goal  Note: Fall Risk Interventions:  Mobility Interventions: Bed/chair exit alarm         Medication Interventions: Teach patient to arise slowly    Elimination Interventions: Call light in reach              Problem: Patient Education: Go to Patient Education Activity  Goal: Patient/Family Education  Outcome: Progressing Towards Goal     Problem: Patient Education: Go to Patient Education Activity  Goal: Patient/Family Education  Outcome: Progressing Towards Goal     Problem: Discharge Planning  Goal: *Discharge to safe environment  Outcome: Progressing Towards Goal     Problem: Breathing Pattern - Ineffective  Goal: *Absence of hypoxia  Outcome: Progressing Towards Goal  Goal: *Use of effective breathing techniques  Outcome: Progressing Towards Goal  Goal: *PALLIATIVE CARE:  Alleviation of Dyspnea  Outcome: Progressing Towards Goal     Problem: Patient Education: Go to Patient Education Activity  Goal: Patient/Family Education  Outcome: Progressing Towards Goal     Problem: Chronic Obstructive Pulmonary Disease (COPD)  Goal: *Oxygen saturation during activity within specified parameters  Outcome: Progressing Towards Goal  Goal: *Able to remain out of bed as prescribed  Outcome: Progressing Towards Goal  Goal: *Absence of hypoxia  Outcome: Progressing Towards Goal  Goal: *Optimize nutritional status  Outcome: Progressing Towards Goal     Problem: Patient Education: Go to Patient Education Activity  Goal: Patient/Family Education  Outcome: Progressing Towards Goal

## 2021-04-24 NOTE — PROGRESS NOTES
0730: Bedside shift change report received by Janki Roberson (offgoing nurse). Report included the following information SBAR, Kardex, Procedure Summary, Intake/Output, MAR, Recent Results, and Cardiac Rhythm SR .     1530: Bedside shift change report given to [facility] (oncoming nurse). Report included the following information SBAR, Kardex, Procedure Summary, Intake/Output, MAR, Recent Results and Cardiac Rhythm SR.     --------------------  Care Plan [id number]  Problem: Falls - Risk of  Goal: *Absence of Falls  Description: Document Bishnu Sites Fall Risk and appropriate interventions in the flowsheet.   Outcome: Progressing Towards Goal  Note: Fall Risk Interventions:  Mobility Interventions: Communicate number of staff needed for ambulation/transfer, OT consult for ADLs, Patient to call before getting OOB, PT Consult for mobility concerns         Medication Interventions: Assess postural VS orthostatic hypotension, Evaluate medications/consider consulting pharmacy    Elimination Interventions: Call light in reach, Patient to call for help with toileting needs, Stay With Me (per policy), Toileting schedule/hourly rounds

## 2021-04-24 NOTE — PROGRESS NOTES
[facility] [facility] Adult  Hospitalist Group                                                                                          Hospitalist Progress Note  Noe Lucas MD  Answering service: [phone] OR [phone] from in house phone      NAME:  Opal Connors  :  1940  MRN:  [id number]      Admission Summary:   Opal Connors is a 68 y.o. female who presents with chest pain. patient reports that she has extensive cardiac history and doctors with Dr. Sara Lawrence, she reports that she has history of COPD and chronic respiratory failure and is on 3 L oxygen at baseline. Patient reports that she woke up this morning and had sudden onset of left-sided chest pain associated with diaphoresis and lightheadedness. Patient reports that she tried to go to the bathroom but felt extremely lightheaded, got concerned and called her children for help, patient was brought to the hospital for further management and evaluation. Patient reports that she is undergoing evaluation because at some point of time she was found to have hypercalcemia and therefore she is set up for parathyroidectomy needs per her endocrinologist.  Patient denies any other complaints or problems. .  Reports that she has been taking her medications on a regular basis       Interval history / Subjective:       Patient seen and examined - nursing reporting respiratory distress after cardiac cath  Pt reports hx of COPD and significant pollen exposure prior to admission   She still feels like her breathing is not back to normal- oxygen weaned back to baseline     Assessment & Plan:     CAD; cardiac cath done by Dr Gisela Kong on 21  Findings=  patent stents; there was a moderate lesion prior to distal Ramus stent; plans for medical treatment- no interventions performed  Follow up with cardiology in office- 1 month    HTN; BP elevated-  increased Coreg     Shortness of breath- worsening after admission  Acute on Chronic hypoxic respiratory failure- Due to acute COPD exac-   baseline on 3 L of oxygen at home- post cath 4-6L  VQ scan was low probability for PE  and LE Dopplers neg for DVT  Started on steroids, CXR repeated and reviewed, added nebs, mucinex, claritin    Diabetes mellitus type II- hyperglycemia from steroids  Accu-Checks= cont Lantus and sliding scale  Increased insulin doses    Elevated creatinine- initially thought to be RENA- but now likely progression to [facility]  Nephrology consulted for eval and tx recs  Creatinine stable but mildly elevated    Hyperlipidemia- Zetia and TriCor    Depression- Cymbalta    Code status: full  DVT prophylaxis: SCD     Care Plan discussed with: Patient/Family  Anticipated Disposition: Home w/Family  Anticipated Discharge: Greater than 48 hours     Hospital Problems  Date Reviewed: 5/15/2020          Codes Class Noted POA    Chest pain ICD-10-CM: R07.9  ICD-9-CM: 786.50  4/20/2021 Unknown                Review of Systems:   A comprehensive review of systems was negative except for that written in the HPI. Vital Signs:    Last 24hrs VS reviewed since prior progress note.  Most recent are:  Visit Vitals  /68 (BP 1 Location: Right upper arm, BP Patient Position: At rest)   Pulse 73   Temp 98.5 °F (36.9 °C)   Resp 16   Ht 5' 5.39\" (1.661 m)   Wt 93 kg (205 lb 0.4 oz)   SpO2 96%   BMI 33.71 kg/m²     Patient Vitals for the past 24 hrs:   Temp Pulse Resp BP SpO2   04/23/21 2313 98.5 °F (36.9 °C) 73 16 118/68 96 %   04/23/21 2140     98 %   04/23/21 2124 98 °F (36.7 °C) 80 16 124/64 100 %   04/23/21 2121     100 %   04/23/21 1600  83      04/23/21 1200  83      04/23/21 0800  82      04/23/21 0314 98.5 °F (36.9 °C) 87 21 (!) 152/71 93 %   04/23/21 0131    (!) 168/72    04/23/21 0125  82 15     04/23/21 0110  84 17 (!) 186/83    04/23/21 0055  87 23     04/23/21 0040  83 16     04/23/21 0025  86 16     04/23/21 0010  85 20         Intake/Output Summary (Last 24 hours) at 4/24/2021 0008  Last data filed at 4/23/2021 1800  Gross per 24 hour   Intake    Output 2901 ml   Net -2901 ml        Physical Examination:     I had a face to face encounter with this patient and independently examined them on 04/24/21 as outlined below:        Constitutional:  Mild shortness of breath   ENT:  Oral mucosa moist,     Resp:  decreased breath sounds bilaterally, wheezing   CV:  Regular rhythm, normal rate, no murmurs, gallops, rubs    GI:  Soft, non distended, non tender. normoactive bowel sounds, no hepatosplenomegaly     Musculoskeletal:  No edema, warm, 2+ pulses throughout    Neurologic:  Moves all extremities. AAOx3, CN II-XII reviewed       Data Review:    Review and/or order of clinical lab test  CXR Results  (Last 48 hours)               04/22/21 1424  XR CHEST PORT Final result    Impression:  The lungs are deeply aerated. The lungs are clear acute process. Narrative:  EXAM: XR CHEST PORT       INDICATION: shortness of breath- hx of COPD on chronic oxygen       COMPARISON: 4/20/2021       FINDINGS: A portable AP radiograph of the chest was obtained at 1442 hours. The   patient is on a cardiac monitor. Heart size is normal. Lungs are deeply aerated. Lungs are clear of an acute process. Mild linear scarring is noted at the left   lung base.  There is an azygos lobe and fissure                 Labs:     Recent Labs     04/21/21  0620   WBC 5.8   HGB 12.2   HCT 37.4        Recent Labs     04/23/21  0000 04/22/21  0352 04/21/21  0620   * 137  136 141   K 4.6 4.2  4.2 4.6   CL 98 104  105 105   CO2 25 27  27 31   BUN 20 21*  20 21*   CREA 1.39* 1.28*  1.28* 1.24*   * 184*  182* 166*   CA 10.1 10.1  10.0  10.1 10.7*   PHOS 2.2* 2.4*  --      Recent Labs     04/23/21  0000 04/22/21  0352 04/21/21  0620   ALT  --   --  20   AP  --   --  64   TBILI  --   --  0.4   TP  --   --  6.2*   ALB 3.8 3.5 3.5   GLOB  --   --  2.7     No results for input(s): INR, PTP, APTT, INREXT, INREXT in the last 72 hours. No results for input(s): FE, TIBC, PSAT, FERR in the last 72 hours. No results found for: FOL, RBCF   No results for input(s): PH, PCO2, PO2 in the last 72 hours.   Recent Labs     04/21/21 0620   TROIQ <0.05     Lab Results   Component Value Date/Time    Cholesterol, total 139 04/21/2021 06:20 AM    HDL Cholesterol 34 04/21/2021 06:20 AM    LDL,Direct 121 (H) 07/22/2015 04:00 AM    LDL, calculated 65 04/21/2021 06:20 AM    Triglyceride 200 (H) 04/21/2021 06:20 AM    CHOL/HDL Ratio 4.1 04/21/2021 06:20 AM     Lab Results   Component Value Date/Time    Glucose (POC) 266 (H) 04/23/2021 09:23 PM    Glucose (POC) 379 (H) 04/23/2021 04:20 PM    Glucose (POC) 350 (H) 04/23/2021 12:13 PM    Glucose (POC) 294 (H) 04/23/2021 06:19 AM    Glucose (POC) 324 (H) 04/22/2021 08:27 PM     Lab Results   Component Value Date/Time    Color YELLOW/STRAW 11/16/2019 07:03 PM    Appearance CLOUDY (A) 11/16/2019 07:03 PM    Specific gravity 1.012 11/16/2019 07:03 PM    pH (UA) 7.5 11/16/2019 07:03 PM    Protein NEGATIVE  11/16/2019 07:03 PM    Glucose 100 (A) 11/16/2019 07:03 PM    Ketone NEGATIVE  11/16/2019 07:03 PM    Bilirubin NEGATIVE  11/16/2019 07:03 PM    Urobilinogen 1.0 11/16/2019 07:03 PM    Nitrites NEGATIVE  11/16/2019 07:03 PM    Leukocyte Esterase NEGATIVE  11/16/2019 07:03 PM    Epithelial cells FEW 11/16/2019 07:03 PM    Bacteria NEGATIVE  11/16/2019 07:03 PM    WBC 0-4 11/16/2019 07:03 PM    RBC 0-5 11/16/2019 07:03 PM         Medications Reviewed:     Current Facility-Administered Medications   Medication Dose Route Frequency    predniSONE (DELTASONE) tablet 30 mg  30 mg Oral DAILY WITH BREAKFAST    insulin glargine (LANTUS) injection 30 Units  30 Units SubCUTAneous DAILY    albuterol-ipratropium (DUO-NEB) 2.5 MG-0.5 MG/3 ML  3 mL Nebulization QID RT    carvediloL (COREG) tablet 12.5 mg  12.5 mg Oral BID WITH MEALS    fenofibrate nanocrystallized (TRICOR) tablet 48 mg  48 mg Oral DAILY    guaiFENesin ER (MUCINEX) tablet 600 mg  600 mg Oral Q12H    loratadine (CLARITIN) tablet 10 mg  10 mg Oral DAILY    oxymetazoline (AFRIN) 0.05 % nasal spray 2 Spray  2 Spray Both Nostrils TID    acetaminophen (TYLENOL) tablet 650 mg  650 mg Oral Q4H PRN    glycopyrrolate-formoterol (BEVESPI AEROSPHERE) 9 mcg-4.8 mcg inhaler (Patient Supplied)  2 Puff Inhalation BID    aspirin chewable tablet 81 mg  81 mg Oral DAILY    DULoxetine (CYMBALTA) capsule 60 mg  60 mg Oral DAILY    ezetimibe (ZETIA) tablet 10 mg  10 mg Oral QHS    pantoprazole (PROTONIX) tablet 40 mg  40 mg Oral ACB    sucralfate (CARAFATE) tablet 1 g  1 g Oral QID    sodium chloride (NS) flush 5-40 mL  5-40 mL IntraVENous Q8H    sodium chloride (NS) flush 5-40 mL  5-40 mL IntraVENous PRN    nitroglycerin (NITROSTAT) tablet 0.4 mg  0.4 mg SubLINGual Q5MIN PRN    glucose chewable tablet 16 g  4 Tab Oral PRN    dextrose (D50W) injection syrg 12.5-25 g  25-50 mL IntraVENous PRN    glucagon (GLUCAGEN) injection 1 mg  1 mg IntraMUSCular PRN    insulin lispro (HUMALOG) injection   SubCUTAneous AC&HS     ______________________________________________________________________  EXPECTED LENGTH OF STAY: 2d 21h  ACTUAL LENGTH OF STAY:          4                 Kai Degroot MD

## 2021-04-25 LAB
ANION GAP SERPL CALC-SCNC: 4 MMOL/L (ref 5–15)
BUN SERPL-MCNC: 31 MG/DL (ref 6–20)
BUN/CREAT SERPL: 22 (ref 12–20)
CALCIUM SERPL-MCNC: 10.4 MG/DL (ref 8.5–10.1)
CHLORIDE SERPL-SCNC: 100 MMOL/L (ref 97–108)
CO2 SERPL-SCNC: 30 MMOL/L (ref 21–32)
CREAT SERPL-MCNC: 1.41 MG/DL (ref 0.55–1.02)
ERYTHROCYTE [DISTWIDTH] IN BLOOD BY AUTOMATED COUNT: 13.3 % (ref 11.5–14.5)
GLUCOSE BLD STRIP.AUTO-MCNC: 143 MG/DL (ref 65–100)
GLUCOSE BLD STRIP.AUTO-MCNC: 176 MG/DL (ref 65–100)
GLUCOSE BLD STRIP.AUTO-MCNC: 230 MG/DL (ref 65–100)
GLUCOSE BLD STRIP.AUTO-MCNC: 351 MG/DL (ref 65–100)
GLUCOSE SERPL-MCNC: 236 MG/DL (ref 65–100)
HCT VFR BLD AUTO: 39.1 % (ref 35–47)
HGB BLD-MCNC: 13.2 G/DL (ref 11.5–16)
MCH RBC QN AUTO: 30.3 PG (ref 26–34)
MCHC RBC AUTO-ENTMCNC: 33.8 G/DL (ref 30–36.5)
MCV RBC AUTO: 89.7 FL (ref 80–99)
NRBC # BLD: 0 K/UL (ref 0–0.01)
NRBC BLD-RTO: 0 PER 100 WBC
PLATELET # BLD AUTO: 178 K/UL (ref 150–400)
PMV BLD AUTO: 9.9 FL (ref 8.9–12.9)
POTASSIUM SERPL-SCNC: 4 MMOL/L (ref 3.5–5.1)
RBC # BLD AUTO: 4.36 M/UL (ref 3.8–5.2)
SERVICE CMNT-IMP: ABNORMAL
SODIUM SERPL-SCNC: 134 MMOL/L (ref 136–145)
WBC # BLD AUTO: 7 K/UL (ref 3.6–11)

## 2021-04-25 PROCEDURE — 74011000250 HC RX REV CODE- 250: Performed by: FAMILY MEDICINE

## 2021-04-25 PROCEDURE — 74011250637 HC RX REV CODE- 250/637: Performed by: FAMILY MEDICINE

## 2021-04-25 PROCEDURE — 85027 COMPLETE CBC AUTOMATED: CPT

## 2021-04-25 PROCEDURE — 94640 AIRWAY INHALATION TREATMENT: CPT

## 2021-04-25 PROCEDURE — 65660000000 HC RM CCU STEPDOWN

## 2021-04-25 PROCEDURE — 80048 BASIC METABOLIC PNL TOTAL CA: CPT

## 2021-04-25 PROCEDURE — 94664 DEMO&/EVAL PT USE INHALER: CPT

## 2021-04-25 PROCEDURE — 74011636637 HC RX REV CODE- 636/637: Performed by: FAMILY MEDICINE

## 2021-04-25 PROCEDURE — 74011250637 HC RX REV CODE- 250/637: Performed by: INTERNAL MEDICINE

## 2021-04-25 PROCEDURE — 82962 GLUCOSE BLOOD TEST: CPT

## 2021-04-25 PROCEDURE — 74011250637 HC RX REV CODE- 250/637: Performed by: NURSE PRACTITIONER

## 2021-04-25 PROCEDURE — 36415 COLL VENOUS BLD VENIPUNCTURE: CPT

## 2021-04-25 RX ORDER — PREDNISONE 20 MG/1
20 TABLET ORAL
Status: DISCONTINUED | OUTPATIENT
Start: 2021-04-26 | End: 2021-04-26 | Stop reason: HOSPADM

## 2021-04-25 RX ORDER — INSULIN LISPRO 100 [IU]/ML
7 INJECTION, SOLUTION INTRAVENOUS; SUBCUTANEOUS
Status: DISCONTINUED | OUTPATIENT
Start: 2021-04-25 | End: 2021-04-26 | Stop reason: HOSPADM

## 2021-04-25 RX ADMIN — LORATADINE 10 MG: 10 TABLET ORAL at 09:04

## 2021-04-25 RX ADMIN — SUCRALFATE 1 G: 1 TABLET ORAL at 13:00

## 2021-04-25 RX ADMIN — CARVEDILOL 12.5 MG: 12.5 TABLET, FILM COATED ORAL at 09:04

## 2021-04-25 RX ADMIN — INSULIN LISPRO 12 UNITS: 100 INJECTION, SOLUTION INTRAVENOUS; SUBCUTANEOUS at 11:30

## 2021-04-25 RX ADMIN — IPRATROPIUM BROMIDE AND ALBUTEROL SULFATE 3 ML: .5; 3 SOLUTION RESPIRATORY (INHALATION) at 16:33

## 2021-04-25 RX ADMIN — SUCRALFATE 1 G: 1 TABLET ORAL at 09:04

## 2021-04-25 RX ADMIN — INSULIN LISPRO 4 UNITS: 100 INJECTION, SOLUTION INTRAVENOUS; SUBCUTANEOUS at 09:09

## 2021-04-25 RX ADMIN — Medication 10 ML: at 21:06

## 2021-04-25 RX ADMIN — DULOXETINE HYDROCHLORIDE 60 MG: 60 CAPSULE, DELAYED RELEASE ORAL at 09:04

## 2021-04-25 RX ADMIN — CARVEDILOL 12.5 MG: 12.5 TABLET, FILM COATED ORAL at 17:42

## 2021-04-25 RX ADMIN — DOCUSATE SODIUM 50 MG AND SENNOSIDES 8.6 MG 1 TABLET: 8.6; 5 TABLET, FILM COATED ORAL at 09:04

## 2021-04-25 RX ADMIN — INSULIN LISPRO 7 UNITS: 100 INJECTION, SOLUTION INTRAVENOUS; SUBCUTANEOUS at 16:30

## 2021-04-25 RX ADMIN — FENOFIBRATE 48 MG: 48 TABLET, FILM COATED ORAL at 09:04

## 2021-04-25 RX ADMIN — EZETIMIBE 10 MG: 10 TABLET ORAL at 21:06

## 2021-04-25 RX ADMIN — IPRATROPIUM BROMIDE AND ALBUTEROL SULFATE 3 ML: .5; 3 SOLUTION RESPIRATORY (INHALATION) at 21:34

## 2021-04-25 RX ADMIN — SUCRALFATE 1 G: 1 TABLET ORAL at 21:06

## 2021-04-25 RX ADMIN — ACETAMINOPHEN 650 MG: 325 TABLET, FILM COATED ORAL at 21:38

## 2021-04-25 RX ADMIN — INSULIN LISPRO 2 UNITS: 100 INJECTION, SOLUTION INTRAVENOUS; SUBCUTANEOUS at 09:09

## 2021-04-25 RX ADMIN — GUAIFENESIN 1200 MG: 600 TABLET, EXTENDED RELEASE ORAL at 09:04

## 2021-04-25 RX ADMIN — IPRATROPIUM BROMIDE AND ALBUTEROL SULFATE 3 ML: .5; 3 SOLUTION RESPIRATORY (INHALATION) at 12:40

## 2021-04-25 RX ADMIN — SUCRALFATE 1 G: 1 TABLET ORAL at 17:43

## 2021-04-25 RX ADMIN — GUAIFENESIN 1200 MG: 600 TABLET, EXTENDED RELEASE ORAL at 21:06

## 2021-04-25 RX ADMIN — DOCUSATE SODIUM 50 MG AND SENNOSIDES 8.6 MG 1 TABLET: 8.6; 5 TABLET, FILM COATED ORAL at 17:43

## 2021-04-25 RX ADMIN — PREDNISONE 30 MG: 20 TABLET ORAL at 09:04

## 2021-04-25 RX ADMIN — ASPIRIN 81 MG: 81 TABLET, CHEWABLE ORAL at 09:04

## 2021-04-25 RX ADMIN — INSULIN GLARGINE 30 UNITS: 100 INJECTION, SOLUTION SUBCUTANEOUS at 09:04

## 2021-04-25 RX ADMIN — OXYMETAZOLINE HCL 2 SPRAY: 0.05 SPRAY NASAL at 09:00

## 2021-04-25 RX ADMIN — PANTOPRAZOLE SODIUM 40 MG: 40 TABLET, DELAYED RELEASE ORAL at 09:04

## 2021-04-25 RX ADMIN — IPRATROPIUM BROMIDE AND ALBUTEROL SULFATE 3 ML: .5; 3 SOLUTION RESPIRATORY (INHALATION) at 08:34

## 2021-04-25 RX ADMIN — Medication 5 ML: at 06:00

## 2021-04-25 RX ADMIN — INSULIN LISPRO 2 UNITS: 100 INJECTION, SOLUTION INTRAVENOUS; SUBCUTANEOUS at 22:53

## 2021-04-25 NOTE — PROGRESS NOTES
0730: Bedside shift change report given to Wy[address] (oncoming nurse) by Kin Hui (offgoing nurse). Report included the following information SBAR and Kardex. 1930: Bedside shift change report given to [address] (oncoming nurse) by Domonique Lewis RN (offgoing nurse). Report included the following information SBAR and Kardex. Problem: Diabetes Self-Management  Goal: *Disease process and treatment process  Description: Define diabetes and identify own type of diabetes; list 3 options for treating diabetes.   Outcome: Progressing Towards Goal

## 2021-04-25 NOTE — PROGRESS NOTES
Physician Progress Note      Kiersten Maza  CSN #:                  [id number]  :                       1940  ADMIT DATE:       2021 12:28 PM  [address] [place] DATE:  RESPONDING  PROVIDER #:        Serina Sy MD          QUERY TEXT:    Dear attending,    Patient admitted with chest pain. Pt noted to have elevated blood sugars with a HgbA1C of 8.6 on 21. Please document in progress notes and discharge summary further specificity regarding the control status of DM: The medical record reflects the following:  Risk Factors: Hx. DM  Clinical Indicators: -, - -HgbA1C 8.6  Per H&P- Diabetes mellitus type 2: Poorly controlled  -Per PN- Diabetes mellitus type II  Started on Lantus and sliding scale  Blood sugars running high in 300s today  Treatment: Monitor vital signs, labwork, POC education and testing, Humalog Q6 SSI, Lantus 25 units qday    Thank you,  Tony Heredia RN, BSN  Clinical documentation Improvement  [phone]  Options provided:  -- DM with hyperglycemia  -- DM with elevated blood sugars, not clinically significant  -- Other - I will add my own diagnosis  -- Disagree - Not applicable / Not valid  -- Disagree - Clinically unable to determine / Unknown  -- Refer to Clinical Documentation Reviewer    PROVIDER RESPONSE TEXT:    This patient has DM with hyperglycemia.     Query created by: Flavio Kitchen on 2021 2:58 PM      Electronically signed by:  Serina Sy MD 2021 6:58 AM

## 2021-04-25 NOTE — PROGRESS NOTES
2000: Received report from [address], [facility].     0000: Pt transferred to 453 out of 468 without difficulty. Pt to BSC to void and back to bed.    0200: Pt to BSC to void, assisted back to bed x1 assist with out difficulty. 0800: Bedside and Verbal shift change report given to Wyatt Barrientos (oncoming nurse) by Justin Wiggins (offgoing nurse). Report included the following information SBAR and Cardiac Rhythm NSR.

## 2021-04-25 NOTE — PROGRESS NOTES
Name: Lopez Delgado MRN: [id number]   : 1940 Hospital: [address]   Date: 2021        IMPRESSION:   · Mild RENA- stable. Patient had similar episodes in the past for many years. MEGHAN is a risk factor. Patient had a card cath on   · H/O hypercalcemia- Ca is high normal. Patient had a nuclear scan of her neck done in . A left lower parathyroid adenoma was visualized then. PPatient is being followed by Dr. Yanelis Luu. · Chest pain- medical therapy was suggested. · Severe hyperglycemia with pseudonatremia      PLAN:   · No need for further renal evaluation. Patient seems stable. Check the renal panel in AM  · No need for further evaluation for Primary Hyperparathyroidism. Patient has an established Dg. She will follow up with her endocrinologist. Patient's Ca is very slightly increased. No need of any hypercalcemia management at present. · Renal panel in AM  · Will see on Monday. if still hospitalized. Subjective/Interval History:   I have reviewed the flowsheet and previous days notes. ROS:A comprehensive review of systems was negative except for that written in the HPI. Objective:   Vital Signs:    Visit Vitals  BP (!) 190/87 (BP 1 Location: Left upper arm, BP Patient Position: At rest)   Pulse 73   Temp 98.2 °F (36.8 °C)   Resp 20   Ht 5' 5.39\" (1.661 m)   Wt 92.2 kg (203 lb 4.2 oz)   SpO2 98%   BMI 33.42 kg/m²       O2 Device: Nasal cannula   O2 Flow Rate (L/min): 3 l/min   Temp (24hrs), Av.4 °F (36.9 °C), Min:98 °F (36.7 °C), Max:98.6 °F (37 °C)       Intake/Output:   Last shift:      No intake/output data recorded. Last 3 shifts:  1901 -  0700  In: -   Out: 950 [Urine:950]    Intake/Output Summary (Last 24 hours) at 2021 1142  Last data filed at 2021 0400  Gross per 24 hour   Intake    Output 950 ml   Net -950 ml        Physical Exam:  General:    Alert, cooperative, no distress, appears stated age.      Head:   Normocephalic, without obvious abnormality, atraumatic. Eyes:   Conjunctivae/corneas clear. Nose:  Nares normal. No drainage or sinus tenderness. Throat:    Lips, mucosa, and tongue normal.  No Thrush  Neck:  Supple, symmetrical,  no adenopathy, thyroid: non tender    no carotid bruit and no JVD. Lungs:   Clear to auscultation bilaterally. No Wheezing or Rhonchi. No rales. Chest wall:  No tenderness or deformity. No Accessory muscle use. Heart:   Regular rate and rhythm,  no murmur, rub or gallop. Abdomen:   Soft, non-tender. Not distended. Bowel sounds normal. No masses  Extremities: Extremities normal, atraumatic, No cyanosis. No edema. No clubbing  Skin:     Texture, turgor normal. No rashes or lesions. Not Jaundiced  Psych:  Good insight. Not depressed. Anxious , not  agitated. Neurologic: Normal strength, Alert and oriented X 3. DATA:  Labs:  Recent Labs     04/25/21  0334 04/24/21  0502 04/23/21  0000   * 135* 132*   K 4.0 4.0 4.6    101 98   CO2 30 30 25   BUN 31* 29* 20   CREA 1.41* 1.23* 1.39*   CA 10.4* 10.2* 10.1   ALB  --   --  3.8   PHOS  --   --  2.2*   MG  --  1.6  --      Recent Labs     04/25/21  0334 04/24/21  0502   WBC 7.0 6.6   HGB 13.2 12.2   HCT 39.1 36.4    174     No results for input(s): INGRID, KU, CLU, CREAU in the last 72 hours.     No lab exists for component: PROU    Total time spent with patient:  35 minutes    [] Critical Care Provided    Care Plan discussed with:   Staff, Medical Team    Irena Thomason MD

## 2021-04-26 VITALS
WEIGHT: 202.82 LBS | HEIGHT: 65 IN | TEMPERATURE: 98 F | SYSTOLIC BLOOD PRESSURE: 148 MMHG | DIASTOLIC BLOOD PRESSURE: 65 MMHG | BODY MASS INDEX: 33.79 KG/M2 | OXYGEN SATURATION: 96 % | RESPIRATION RATE: 18 BRPM | HEART RATE: 77 BPM

## 2021-04-26 LAB
ANION GAP SERPL CALC-SCNC: 5 MMOL/L (ref 5–15)
BUN SERPL-MCNC: 27 MG/DL (ref 6–20)
BUN/CREAT SERPL: 19 (ref 12–20)
CALCIUM SERPL-MCNC: 10.4 MG/DL (ref 8.5–10.1)
CHLORIDE SERPL-SCNC: 101 MMOL/L (ref 97–108)
CO2 SERPL-SCNC: 28 MMOL/L (ref 21–32)
CREAT SERPL-MCNC: 1.44 MG/DL (ref 0.55–1.02)
ERYTHROCYTE [DISTWIDTH] IN BLOOD BY AUTOMATED COUNT: 13.2 % (ref 11.5–14.5)
GLUCOSE BLD STRIP.AUTO-MCNC: 194 MG/DL (ref 65–100)
GLUCOSE BLD STRIP.AUTO-MCNC: 252 MG/DL (ref 65–100)
GLUCOSE SERPL-MCNC: 216 MG/DL (ref 65–100)
HCT VFR BLD AUTO: 40.4 % (ref 35–47)
HGB BLD-MCNC: 13.7 G/DL (ref 11.5–16)
MCH RBC QN AUTO: 30.4 PG (ref 26–34)
MCHC RBC AUTO-ENTMCNC: 33.9 G/DL (ref 30–36.5)
MCV RBC AUTO: 89.8 FL (ref 80–99)
NRBC # BLD: 0 K/UL (ref 0–0.01)
NRBC BLD-RTO: 0 PER 100 WBC
PLATELET # BLD AUTO: 196 K/UL (ref 150–400)
PMV BLD AUTO: 9.4 FL (ref 8.9–12.9)
POTASSIUM SERPL-SCNC: 4.1 MMOL/L (ref 3.5–5.1)
RBC # BLD AUTO: 4.5 M/UL (ref 3.8–5.2)
SERVICE CMNT-IMP: ABNORMAL
SERVICE CMNT-IMP: ABNORMAL
SODIUM SERPL-SCNC: 134 MMOL/L (ref 136–145)
WBC # BLD AUTO: 7.9 K/UL (ref 3.6–11)

## 2021-04-26 PROCEDURE — 74011000250 HC RX REV CODE- 250: Performed by: FAMILY MEDICINE

## 2021-04-26 PROCEDURE — 74011250637 HC RX REV CODE- 250/637: Performed by: INTERNAL MEDICINE

## 2021-04-26 PROCEDURE — 74011636637 HC RX REV CODE- 636/637: Performed by: FAMILY MEDICINE

## 2021-04-26 PROCEDURE — 94640 AIRWAY INHALATION TREATMENT: CPT

## 2021-04-26 PROCEDURE — 82962 GLUCOSE BLOOD TEST: CPT

## 2021-04-26 PROCEDURE — 97116 GAIT TRAINING THERAPY: CPT

## 2021-04-26 PROCEDURE — 74011250637 HC RX REV CODE- 250/637: Performed by: FAMILY MEDICINE

## 2021-04-26 PROCEDURE — 85027 COMPLETE CBC AUTOMATED: CPT

## 2021-04-26 PROCEDURE — 80048 BASIC METABOLIC PNL TOTAL CA: CPT

## 2021-04-26 PROCEDURE — 77010033678 HC OXYGEN DAILY

## 2021-04-26 PROCEDURE — 97530 THERAPEUTIC ACTIVITIES: CPT

## 2021-04-26 PROCEDURE — 36415 COLL VENOUS BLD VENIPUNCTURE: CPT

## 2021-04-26 RX ORDER — IPRATROPIUM BROMIDE AND ALBUTEROL SULFATE 2.5; .5 MG/3ML; MG/3ML
3 SOLUTION RESPIRATORY (INHALATION)
Status: DISCONTINUED | OUTPATIENT
Start: 2021-04-26 | End: 2021-04-26 | Stop reason: HOSPADM

## 2021-04-26 RX ORDER — INSULIN LISPRO 100 [IU]/ML
8 INJECTION, SOLUTION INTRAVENOUS; SUBCUTANEOUS
Qty: 7.2 ML | Refills: 3 | Status: SHIPPED | OUTPATIENT
Start: 2021-04-26 | End: 2021-05-26

## 2021-04-26 RX ORDER — PREDNISONE 10 MG/1
TABLET ORAL
Qty: 7 TAB | Refills: 0 | Status: SHIPPED | OUTPATIENT
Start: 2021-04-26 | End: 2021-05-02

## 2021-04-26 RX ORDER — CARVEDILOL 12.5 MG/1
12.5 TABLET ORAL 2 TIMES DAILY WITH MEALS
Qty: 60 TAB | Refills: 4 | Status: SHIPPED | OUTPATIENT
Start: 2021-04-26

## 2021-04-26 RX ORDER — LORATADINE 10 MG/1
10 TABLET ORAL DAILY
Qty: 30 TAB | Refills: 2 | Status: SHIPPED | OUTPATIENT
Start: 2021-04-27

## 2021-04-26 RX ORDER — ALBUTEROL SULFATE 0.83 MG/ML
2.5 SOLUTION RESPIRATORY (INHALATION)
Qty: 25 NEBULE | Refills: 4 | Status: SHIPPED | OUTPATIENT
Start: 2021-04-26

## 2021-04-26 RX ORDER — PEN NEEDLE, DIABETIC 29 G X1/2"
1 NEEDLE, DISPOSABLE MISCELLANEOUS 4 TIMES DAILY
Qty: 120 PEN NEEDLE | Refills: 4 | Status: SHIPPED | OUTPATIENT
Start: 2021-04-26

## 2021-04-26 RX ORDER — LISINOPRIL 5 MG/1
5 TABLET ORAL DAILY
Status: DISCONTINUED | OUTPATIENT
Start: 2021-04-26 | End: 2021-04-26 | Stop reason: HOSPADM

## 2021-04-26 RX ADMIN — LISINOPRIL 5 MG: 5 TABLET ORAL at 09:08

## 2021-04-26 RX ADMIN — INSULIN GLARGINE 30 UNITS: 100 INJECTION, SOLUTION SUBCUTANEOUS at 09:09

## 2021-04-26 RX ADMIN — SUCRALFATE 1 G: 1 TABLET ORAL at 09:08

## 2021-04-26 RX ADMIN — LORATADINE 10 MG: 10 TABLET ORAL at 09:09

## 2021-04-26 RX ADMIN — INSULIN LISPRO 6 UNITS: 100 INJECTION, SOLUTION INTRAVENOUS; SUBCUTANEOUS at 12:23

## 2021-04-26 RX ADMIN — PREDNISONE 20 MG: 20 TABLET ORAL at 09:08

## 2021-04-26 RX ADMIN — DULOXETINE HYDROCHLORIDE 60 MG: 60 CAPSULE, DELAYED RELEASE ORAL at 09:08

## 2021-04-26 RX ADMIN — FENOFIBRATE 48 MG: 48 TABLET, FILM COATED ORAL at 09:08

## 2021-04-26 RX ADMIN — IPRATROPIUM BROMIDE AND ALBUTEROL SULFATE 3 ML: .5; 3 SOLUTION RESPIRATORY (INHALATION) at 12:57

## 2021-04-26 RX ADMIN — GUAIFENESIN 1200 MG: 600 TABLET, EXTENDED RELEASE ORAL at 09:08

## 2021-04-26 RX ADMIN — INSULIN LISPRO 10 UNITS: 100 INJECTION, SOLUTION INTRAVENOUS; SUBCUTANEOUS at 09:09

## 2021-04-26 RX ADMIN — PANTOPRAZOLE SODIUM 40 MG: 40 TABLET, DELAYED RELEASE ORAL at 07:23

## 2021-04-26 RX ADMIN — SUCRALFATE 1 G: 1 TABLET ORAL at 12:22

## 2021-04-26 RX ADMIN — INSULIN LISPRO 7 UNITS: 100 INJECTION, SOLUTION INTRAVENOUS; SUBCUTANEOUS at 12:22

## 2021-04-26 RX ADMIN — ASPIRIN 81 MG: 81 TABLET, CHEWABLE ORAL at 09:08

## 2021-04-26 RX ADMIN — CARVEDILOL 12.5 MG: 12.5 TABLET, FILM COATED ORAL at 09:08

## 2021-04-26 NOTE — PROGRESS NOTES
Transitions of Care Plan:  RUR:  16%  Clinical Plan: Anticipate discharge today  Consults: Therapy  Baseline: ambulates with rollator; home O2 at 3LPM; resides alone  Disposition: Home with [address] [facility] received SBAR from previous CM and spoke with attending MD. Kemar Sykes discharge today. Patient is set up with home health services through [facility]. CM will continue to follow.     Ember Rios, MPH  Care Manager [facility]  Available via [address] [place] [address] or

## 2021-04-26 NOTE — PROGRESS NOTES
[facility] Adult  Hospitalist Group                                                                                          Hospitalist Progress Note  Charles Rodriguez MD  Answering service: [phone] OR [phone] from in house phone      NAME:  Woody Vallejo  :  1940  MRN:  [id number]      Admission Summary:   Woody Vallejo is a 68 y.o. female who presents with chest pain. patient reports that she has extensive cardiac history and doctors with Dr. Merlene Pappas, she reports that she has history of COPD and chronic respiratory failure and is on 3 L oxygen at baseline. Patient reports that she woke up this morning and had sudden onset of left-sided chest pain associated with diaphoresis and lightheadedness. Patient reports that she tried to go to the bathroom but felt extremely lightheaded, got concerned and called her children for help, patient was brought to the hospital for further management and evaluation. Patient reports that she is undergoing evaluation because at some point of time she was found to have hypercalcemia and therefore she is set up for parathyroidectomy needs per her endocrinologist.  Patient denies any other complaints or problems. .  Reports that she has been taking her medications on a regular basis       Interval history / Subjective:       Patient seen and examined -  respiratory status improving  Pt reports hx of COPD and significant pollen exposure prior to admission   She still feels like her breathing is not quite back to normal- oxygen weaned back to baseline     Assessment & Plan:     CAD; cardiac cath done by Dr Stacey Willard on 21  Findings=  patent stents; there was a moderate lesion prior to distal Ramus stent; plans for medical treatment- no interventions performed  Follow up with cardiology in office- 1 month    HTN; BP stable with  increased Coreg dose    Shortness of breath- worsening after admission  Acute on Chronic hypoxic respiratory failure-  Due to acute COPD exac-   baseline on 3 L of oxygen at home- post cath 4-6L  VQ scan was low probability for PE  and LE Dopplers neg for DVT  Started on steroids, CXR repeated and reviewed, added nebs, mucinex, claritin  Starting to wean steroids, breathing improved- plans for DC home tomorrow    Diabetes mellitus type II- hyperglycemia from steroids  Accu-Checks= cont Lantus and sliding scale  Increased insulin doses due to hyperglycemia  Likely will need to go home w/ short acting insulin    Elevated creatinine- initially thought to be RENA- but now likely progression to [facility]  Nephrology consulted for eval and tx recs  Creatinine stable but mildly elevated    Hyperlipidemia- Zetia and TriCor    Depression- Cymbalta    Code status: full  DVT prophylaxis: SCD     Care Plan discussed with: Patient/Family  Anticipated Disposition: Home w/Family  Anticipated Discharge: Greater than 48 hours     Hospital Problems  Date Reviewed: 5/15/2020          Codes Class Noted POA    Chest pain ICD-10-CM: R07.9  ICD-9-CM: 786.50  4/20/2021 Unknown                Review of Systems:   A comprehensive review of systems was negative except for that written in the HPI. Vital Signs:    Last 24hrs VS reviewed since prior progress note.  Most recent are:  Visit Vitals  BP (!) 152/75 (BP 1 Location: Left arm, BP Patient Position: At rest)   Pulse 81   Temp 98.1 °F (36.7 °C)   Resp 18   Ht 5' 5.39\" (1.661 m)   Wt 92 kg (202 lb 13.2 oz)   SpO2 95%   BMI 33.35 kg/m²     Patient Vitals for the past 24 hrs:   Temp Pulse Resp BP SpO2   04/25/21 2135     95 %   04/25/21 2048 98.1 °F (36.7 °C) 81 18 (!) 152/75 95 %   04/25/21 1634     95 %   04/25/21 1613 98.6 °F (37 °C) 81 16 (!) 179/87 95 %   04/25/21 1237 99 °F (37.2 °C) 79 16 (!) 172/91 98 %   04/25/21 0800 98.2 °F (36.8 °C) 73 20 (!) 190/87 98 %   04/25/21 0337 98.5 °F (36.9 °C) 79 23 (!) 152/68 99 %   04/24/21 2346 98.4 °F (36.9 °C) 78 16 (!) 159/53 96 %       Intake/Output Summary (Last 24 hours) at 4/25/2021 2301  Last data filed at 4/25/2021 2104  Gross per 24 hour   Intake 880 ml   Output 1650 ml   Net -770 ml        Physical Examination:     I had a face to face encounter with this patient and independently examined them on 04/25/21 as outlined below:        Constitutional:  Mild shortness of breath   ENT:  Oral mucosa moist,     Resp:  decreased breath sounds bilaterally, wheezing   CV:  Regular rhythm, normal rate, no murmurs, gallops, rubs    GI:  Soft, non distended, non tender. normoactive bowel sounds, no hepatosplenomegaly     Musculoskeletal:  No edema, warm, 2+ pulses throughout    Neurologic:  Moves all extremities. AAOx3, CN II-XII reviewed       Data Review:    Review and/or order of clinical lab test  CXR Results                 04/22/21 1424  XR CHEST PORT Final result    Impression:  The lungs are deeply aerated. The lungs are clear acute process. Narrative:  EXAM: XR CHEST PORT       INDICATION: shortness of breath- hx of COPD on chronic oxygen       COMPARISON: 4/20/2021       FINDINGS: A portable AP radiograph of the chest was obtained at 1442 hours. The   patient is on a cardiac monitor. Heart size is normal. Lungs are deeply aerated. Lungs are clear of an acute process. Mild linear scarring is noted at the left   lung base. There is an azygos lobe and fissure                 Labs:     Recent Labs     04/25/21  0334 04/24/21  0502   WBC 7.0 6.6   HGB 13.2 12.2   HCT 39.1 36.4    174     Recent Labs     04/25/21  0334 04/24/21  0502 04/23/21  0000   * 135* 132*   K 4.0 4.0 4.6    101 98   CO2 30 30 25   BUN 31* 29* 20   CREA 1.41* 1.23* 1.39*   * 254* 432*   CA 10.4* 10.2* 10.1   MG  --  1.6  --    PHOS  --   --  2.2*     Recent Labs     04/23/21  0000   ALB 3.8     No results for input(s): INR, PTP, APTT, INREXT, INREXT in the last 72 hours. No results for input(s): FE, TIBC, PSAT, FERR in the last 72 hours.    No results found for: FOL, RBCF No results for input(s): PH, PCO2, PO2 in the last 72 hours. No results for input(s): CPK, CKNDX, TROIQ in the last 72 hours.     No lab exists for component: CPKMB  Lab Results   Component Value Date/Time    Cholesterol, total 139 04/21/2021 06:20 AM    HDL Cholesterol 34 04/21/2021 06:20 AM    LDL,Direct 121 (H) 07/22/2015 04:00 AM    LDL, calculated 65 04/21/2021 06:20 AM    Triglyceride 200 (H) 04/21/2021 06:20 AM    CHOL/HDL Ratio 4.1 04/21/2021 06:20 AM     Lab Results   Component Value Date/Time    Glucose (POC) 230 (H) 04/25/2021 09:32 PM    Glucose (POC) 143 (H) 04/25/2021 05:12 PM    Glucose (POC) 351 (H) 04/25/2021 11:03 AM    Glucose (POC) 176 (H) 04/25/2021 06:29 AM    Glucose (POC) 322 (H) 04/24/2021 09:21 PM     Lab Results   Component Value Date/Time    Color YELLOW/STRAW 11/16/2019 07:03 PM    Appearance CLOUDY (A) 11/16/2019 07:03 PM    Specific gravity 1.012 11/16/2019 07:03 PM    pH (UA) 7.5 11/16/2019 07:03 PM    Protein NEGATIVE  11/16/2019 07:03 PM    Glucose 100 (A) 11/16/2019 07:03 PM    Ketone NEGATIVE  11/16/2019 07:03 PM    Bilirubin NEGATIVE  11/16/2019 07:03 PM    Urobilinogen 1.0 11/16/2019 07:03 PM    Nitrites NEGATIVE  11/16/2019 07:03 PM    Leukocyte Esterase NEGATIVE  11/16/2019 07:03 PM    Epithelial cells FEW 11/16/2019 07:03 PM    Bacteria NEGATIVE  11/16/2019 07:03 PM    WBC 0-4 11/16/2019 07:03 PM    RBC 0-5 11/16/2019 07:03 PM         Medications Reviewed:     Current Facility-Administered Medications   Medication Dose Route Frequency    insulin lispro (HUMALOG) injection 7 Units  7 Units SubCUTAneous TIDAC    [START ON 4/26/2021] predniSONE (DELTASONE) tablet 20 mg  20 mg Oral DAILY WITH BREAKFAST    guaiFENesin ER (MUCINEX) tablet 1,200 mg  1,200 mg Oral Q12H    senna-docusate (PERICOLACE) 8.6-50 mg per tablet 1 Tab  1 Tab Oral BID    insulin glargine (LANTUS) injection 30 Units  30 Units SubCUTAneous DAILY    albuterol-ipratropium (DUO-NEB) 2.5 MG-0.5 MG/3 ML  3 mL Nebulization QID RT    carvediloL (COREG) tablet 12.5 mg  12.5 mg Oral BID WITH MEALS    fenofibrate nanocrystallized (TRICOR) tablet 48 mg  48 mg Oral DAILY    loratadine (CLARITIN) tablet 10 mg  10 mg Oral DAILY    acetaminophen (TYLENOL) tablet 650 mg  650 mg Oral Q4H PRN    glycopyrrolate-formoterol (BEVESPI AEROSPHERE) 9 mcg-4.8 mcg inhaler (Patient Supplied)  2 Puff Inhalation BID    aspirin chewable tablet 81 mg  81 mg Oral DAILY    DULoxetine (CYMBALTA) capsule 60 mg  60 mg Oral DAILY    ezetimibe (ZETIA) tablet 10 mg  10 mg Oral QHS    pantoprazole (PROTONIX) tablet 40 mg  40 mg Oral ACB    sucralfate (CARAFATE) tablet 1 g  1 g Oral QID    sodium chloride (NS) flush 5-40 mL  5-40 mL IntraVENous Q8H    sodium chloride (NS) flush 5-40 mL  5-40 mL IntraVENous PRN    nitroglycerin (NITROSTAT) tablet 0.4 mg  0.4 mg SubLINGual Q5MIN PRN    glucose chewable tablet 16 g  4 Tab Oral PRN    dextrose (D50W) injection syrg 12.5-25 g  25-50 mL IntraVENous PRN    glucagon (GLUCAGEN) injection 1 mg  1 mg IntraMUSCular PRN    insulin lispro (HUMALOG) injection   SubCUTAneous AC&HS     ______________________________________________________________________  EXPECTED LENGTH OF STAY: 2d 21h  ACTUAL LENGTH OF STAY:          5                 Shabnam Carlson MD

## 2021-04-26 NOTE — PROGRESS NOTES
Problem: Mobility Impaired (Adult and Pediatric)  Goal: *Acute Goals and Plan of Care (Insert Text)  Description: FUNCTIONAL STATUS PRIOR TO ADMISSION: Patient was modified independent using a rollator for functional mobility. Completes all her own self-care, cooks, and drives. Notes decreased activity tolerance and SOB over last months. HOME SUPPORT PRIOR TO ADMISSION: The patient lived alone but has children locally to provide assistance as needed. Physical Therapy Goals  Initiated 4/22/2021  1. Patient will move from supine to sit and sit to supine , scoot up and down, and roll side to side in bed with modified independence within 7 day(s). 2.  Patient will transfer from bed to chair and chair to bed with modified independence using the least restrictive device within 7 day(s). 3.  Patient will perform sit to stand with modified independence within 7 day(s). 4.  Patient will ambulate with modified independence for 300 feet with the least restrictive device within 7 day(s). Outcome: Progressing Towards Goal    PHYSICAL THERAPY TREATMENT  Patient: Nalini Can (46 y.o. female)  Date: 4/26/2021  Diagnosis: Chest pain [R07.9] <principal problem not specified>  Procedure(s) (LRB):  LEFT HEART CATH / CORONARY ANGIOGRAPHY (N/A) 5 Days Post-Op  Precautions:    Chart, physical therapy assessment, plan of care and goals were reviewed. ASSESSMENT  Patient continues with skilled PT services and is progressing towards goals. Pt reports discharging home today. Pt was agreeable to ambulating in the room. Pt was able to increase gait tolerance. Pt is not expressing any concerns about discharge. Current Level of Function Impacting Discharge (mobility/balance): SBA    Other factors to consider for discharge:          PLAN :  Patient continues to benefit from skilled intervention to address the above impairments. Continue treatment per established plan of care. to address goals.     Recommendation for discharge: (in order for the patient to meet his/her long term goals)  HHPT vs none    This discharge recommendation:  Has not yet been discussed the attending provider and/or case management    IF patient discharges home will need the following DME: patient owns DME required for discharge       SUBJECTIVE:   Patient stated I am going home today.     OBJECTIVE DATA SUMMARY:   Critical Behavior:  Neurologic State: Alert  Orientation Level: Oriented X4  Cognition: Appropriate for age attention/concentration     Functional Mobility Training:  Bed Mobility:     Supine to Sit: Modified independent              Transfers:  Sit to Stand: Supervision  Stand to Sit: Supervision                             Balance:  Sitting: Intact  Standing: Impaired  Standing - Static: Good  Standing - Dynamic : Fair  Ambulation/Gait Training:  Distance (ft): (in room multiple times)  Assistive Device: Gait belt;Walker, rollator  Ambulation - Level of Assistance: Stand-by assistance        Gait Abnormalities: Decreased step clearance                 Step Length: Left shortened;Right shortened                    Stairs: Therapeutic Exercises:     Pain Rating:  No complaints     Activity Tolerance:   Improving     After treatment patient left in no apparent distress:   Sitting in chair and Call bell within reach    COMMUNICATION/COLLABORATION:   The patients plan of care was discussed with: Registered nurse.      Jorge Crump PTA   Time Calculation: 28 mins

## 2021-04-26 NOTE — PROGRESS NOTES
Name: Flor Reddy MRN: [id number]   : 1940 Hospital: [facility] [address]   Date: 2021        IMPRESSION:   · Mild RENA- stable. Patient had similar episodes in the past for many years. MEGHAN is a risk factor. Patient  ·   had a card cath on   · H/O hypercalcemia - > Patient had a nuclear scan of her neck done in  --> left lower parathyroid  ·   adenoma was visualized then. She is followed by Dr. Shlomo Fabian (Endocrinology) . · Chest pain- medical therapy was suggested. · Severe hyperglycemia with pseudonatremia      PLAN:   · No need for further renal evaluation. Patient seems stable. · No need for further evaluation for Primary Hyperparathyroidism. Patient has an established Dg. She  ·  will follow up with her endocrinologist on discharge. Subjective/Interval History:       F/U RENA , Hypercalcemia --> 21     \" I'm going home today \". There were no new complaints. Objective:   Vital Signs:    Visit Vitals  BP (!) 165/68 (BP 1 Location: Right arm, BP Patient Position: At rest)   Pulse 79   Temp 98.4 °F (36.9 °C)   Resp 18   Ht 5' 5.39\" (1.661 m)   Wt 92 kg (202 lb 13.2 oz)   SpO2 98%   BMI 33.35 kg/m²       O2 Device: Nasal cannula   O2 Flow Rate (L/min): 3 l/min   Temp (24hrs), Av.4 °F (36.9 °C), Min:97.9 °F (36.6 °C), Max:99 °F (37.2 °C)       Intake/Output:   Last shift:      701 - 1900  In: -   Out: 500 [Urine:500]  Last 3 shifts: 1901 -  07  In: 880 [P.O.:880]  Out:  [Urine:]    Intake/Output Summary (Last 24 hours) at 2021 1016  Last data filed at 2021 0944  Gross per 24 hour   Intake 640 ml   Output 1900 ml   Net -1260 ml        Physical Exam:      Seen in Room 453. General:    Alert, cooperative,     Head:   Normocephalic    Eyes:   No icterus     Lungs:   Clear to auscultation, No rales , No wheezes. Latasha Brill Heart:   No S3  Gallop , No pericardial rub  . Abdomen:   Not distended.       Extremities: Mild leg oedema    Psych:  Smiling + Jovial    Neurologic: Alert and oriented. DATA:  Labs:  Recent Labs     04/26/21 0415 04/25/21  0334 04/24/21  0502   * 134* 135*   K 4.1 4.0 4.0    100 101   CO2 28 30 30   BUN 27* 31* 29*   CREA 1.44* 1.41* 1.23*   CA 10.4* 10.4* 10.2*   MG  --   --  1.6     Recent Labs     04/26/21 0415 04/25/21  0334 04/24/21  0502   WBC 7.9 7.0 6.6   HGB 13.7 13.2 12.2   HCT 40.4 39.1 36.4    178 174     No results for input(s): INGRID, KU, CLU, CREAU in the last 72 hours.     No lab exists for component: PROU    Total time spent with patient:        Care Plan discussed with:         Maia Gaming MD

## 2021-04-26 NOTE — DISCHARGE INSTRUCTIONS
Regarding your heart/cardiac issues- take cardiac medications as instructed  Review discharge medication list with home meds to be sure your dose and regimen are correct  Monitor your blood pressure at home and take records to your cardiology follow up appt  Follow up with Dr Ben Campa in his office in 1 month- call for appt  [address] [phone]    Regarding breathing issues- continue prednisone taper as instructed  Nebulizer treatments can be used in addition to your regular inhalers if needed for shortness of breath  Continue home oxygen- 3Liters via nasal canula continuously    Regarding your elevated blood sugars- due to diabetes and steroids  - we increased your lantus- long acting insulin dose and we added fast acting insulin for elevated blood sugars  Take as directed and check your blood sugars to prevent high and low glucose levels  Low salt- Diabetic diet  Follow up with Dr Juan Jose Sequeira for further diabetes management

## 2021-04-26 NOTE — PROGRESS NOTES
Hospital follow-up PCP transitional care appointment has been scheduled with Dr. Collin Manriquez.  for Thursday, 4/29/21 at 11:00 a.m. Pending patient discharge.   Ty Jung, Care Management Specialist.

## 2021-04-26 NOTE — DISCHARGE SUMMARY
Discharge Summary       PATIENT ID: Bridgett Menendez  MRN: [id number]   YOB: 1940    DATE OF ADMISSION: 4/20/2021 12:28 PM    DATE OF DISCHARGE: 4/26/21 12:00 noon  PRIMARY CARE PROVIDER: Sergey Gibson MD     ATTENDING PHYSICIAN: Ritchie Babinski  DISCHARGING PROVIDER: Berenice Navarro MD    To contact this individual call [phone] and ask the  to page. If unavailable ask to be transferred the Adult Hospitalist Department. CONSULTATIONS: IP CONSULT TO CARDIOLOGY  IP CONSULT TO NEPHROLOGY    PROCEDURES/SURGERIES: Procedure(s):  LEFT HEART CATH / CORONARY ANGIOGRAPHY    ADMITTING [address] [facility] COURSE:   Robinson Dsouza is a 68 y. o. female who presents with chest pain. patient reports that she has extensive cardiac history and doctors with Dr. Darrick Brewer, she reports that she has history of COPD and chronic respiratory failure and is on 3 L oxygen at baseline.  Patient reports that she woke up this morning and had sudden onset of left-sided chest pain associated with diaphoresis and lightheadedness.  Patient reports that she tried to go to the bathroom but felt extremely lightheaded, got concerned and called her children for help, patient was brought to the hospital for further management and evaluation. Clement Jacques reports that she is undergoing evaluation because at some point of time she was found to have hypercalcemia and therefore she is set up for parathyroidectomy needs per her endocrinologist. Clement Jacques denies any other complaints or problems. Marlena Carlson that she has been taking her medications on a regular basis    DISCHARGE DIAGNOSES / PLAN:      CAD; cardiac cath done by Dr Yris Harvey on 4/21/21  Findings=  patent stents; there was a moderate lesion prior to distal Ramus stent; plans for medical treatment- no interventions performed  Follow up with cardiology in office- 1 month     HTN; BP stable with  increased Coreg dose     Shortness of breath- worsening after admission  Acute on Chronic hypoxic respiratory failure-  Due to acute COPD exac-   Back to baseline on 3 L of oxygen at home- post cath 4-6L  VQ scan was low probability for PE  and LE Dopplers neg for DVT  Started on steroids, CXR repeated and reviewed, added nebs, mucinex, claritin  Starting to wean steroids, breathing improved- plans for DC home today     Diabetes mellitus type II- hyperglycemia from steroids  Accu-Checks= cont Lantus and sliding scale  Increased insulin doses due to hyperglycemia   to go home w/ short acting insulin     Elevated creatinine- initially thought to be RENA- but now likely progression to [facility]  Nephrology consulted for eval and tx recs  Creatinine stable but mildly elevated     Hyperlipidemia- Zetia and TriCor     Depression- Cymbalta     ADDITIONAL CARE RECOMMENDATIONS:   Regarding your heart/cardiac issues- take cardiac medications as instructed  Review discharge medication list with home meds to be sure your dose and regimen are correct  Monitor your blood pressure at home and take records to your cardiology follow up appt  Follow up with Dr Dea Dancer in his office in 1 month- call for appt  [address] [place] [address] At [address]  [phone]    Regarding breathing issues- continue prednisone taper as instructed  Nebulizer treatments can be used in addition to your regular inhalers if needed for shortness of breath  Continue home oxygen- 3Liters via nasal canula continuously    Regarding your elevated blood sugars- due to diabetes and steroids  - we increased your lantus- long acting insulin dose and we added fast acting insulin for elevated blood sugars  Take as directed and check your blood sugars to prevent high and low glucose levels  Low salt- Diabetic diet  Follow up with Dr Anselmo Acuna for further diabetes management     PENDING TEST RESULTS:   At the time of discharge the following test results are still pending: none    FOLLOW UP APPOINTMENTS:    Follow-up Information     Follow up With Specialties Details Why [address] [facility]  [phone]    Ramírez Conner MD Cardiology In 1 month for hospital follow up [address]      Maci Mane MD Family Medicine   [address] [facility],Third Floor [address] [phone]           DIET: Diabetic Diet  ACTIVITY: Activity as tolerated  WOUND CARE: NA  EQUIPMENT needed: none    DISCHARGE MEDICATIONS:  Current Discharge Medication List      START taking these medications    Details   guaiFENesin ER (MUCINEX) 1,200 mg Ta12 ER tablet Take 1 Tab by mouth every twelve (12) hours. Qty: 20 Tab, Refills: 0      loratadine (CLARITIN) 10 mg tablet Take 1 Tab by mouth daily. Qty: 30 Tab, Refills: 2      predniSONE (DELTASONE) 10 mg tablet Take 20 mg by mouth daily (with breakfast) for 2 days, THEN 10 mg daily (with breakfast) for 2 days, THEN 5 mg daily (with breakfast) for 2 days. Qty: 7 Tab, Refills: 0      insulin lispro (HUMALOG) 100 unit/mL kwikpen 8 Units by SubCUTAneous route Before breakfast, lunch, and dinner for 30 days. Take extra insulin for elevated glucose levels- 2 more units for premeal glucose 200-250, 4 units more 251-300  Indications: type 2 diabetes mellitus  Qty: 7.2 mL, Refills: 3      Insulin Needles, Disposable, (Pen Needles) 31 gauge x 1/4\" ndle 1 Units by SubCUTAneous route four (4) times daily. Qty: 120 Pen Needle, Refills: 4    Associated Diagnoses: Diabetes mellitus due to underlying condition with ketoacidosis without coma, without long-term current use of insulin ([facility]         CONTINUE these medications which have CHANGED    Details   albuterol (PROVENTIL VENTOLIN) 2.5 mg /3 mL (0.083 %) nebu 3 mL by Nebulization route every four (4) hours as needed for Wheezing or Shortness of Breath.  Indications: bronchospasm  Qty: 25 Nebule, Refills: 4    Associated Diagnoses: Bronchitis carvediloL (COREG) 12.5 mg tablet Take 1 Tab by mouth two (2) times daily (with meals). Qty: 60 Tab, Refills: 4         CONTINUE these medications which have NOT CHANGED    Details   sucralfate (CARAFATE) 1 gram tablet Take 1 g by mouth four (4) times daily. ondansetron (Zofran ODT) 4 mg disintegrating tablet Take 4 mg by mouth every eight (8) hours as needed for Nausea or Vomiting. azelastine (ASTEPRO) 0.15 % (205.5 mcg) by Both Nostrils route daily. glycopyrrolate-formoterol (BEVESPI AEROSPHERE) 9-4.8 mcg HFAA Take 2 Puffs by inhalation two (2) times a day. Qty: 1 Inhaler, Refills: 0      pravastatin (PRAVACHOL) 40 mg tablet Take 1 Tab by mouth nightly. Qty: 30 Tab, Refills: 0      acetaminophen (TYLENOL) 500 mg tablet Take 1 Tab by mouth every four (4) hours as needed for Pain or Fever. Qty: 30 Tab, Refills: 0      fenofibrate (LOFIBRA) 160 mg tablet Take 1 Tab by mouth daily. Qty: 30 Tab, Refills: 0      magnesium oxide (MAG-OX) 400 mg tablet Take 1 Tab by mouth two (2) times a day. Qty: 60 Tab, Refills: 1      omeprazole (PRILOSEC) 20 mg capsule Take 1 Cap by mouth nightly. Qty: 90 Cap, Refills: 0      senna-docusate (PERICOLACE) 8.6-50 mg per tablet Take 2 Tabs by mouth two (2) times a day. Qty: 60 Tab, Refills: 0      LANTUS SOLOSTAR U-100 INSULIN 100 unit/mL (3 mL) inpn 25 Units by SubCUTAneous route daily. Qty: 1 Pen, Refills: 0      ezetimibe (ZETIA) 10 mg tablet Take 10 mg by mouth nightly. ACCU-CHEK SHELIA PLUS TEST STRP strip TEST BLOOD SUAGR TWICE A DAY  Refills: 5      lisinopril (PRINIVIL, ZESTRIL) 10 mg tablet Take 0.5 Tabs by mouth daily. Qty: 30 Tab, Refills: 6      DULoxetine (CYMBALTA) 60 mg capsule Take 1 capsule by mouth  every day  Qty: 90 Cap, Refills: 0      aspirin 81 mg chewable tablet Take 1 Tab by mouth daily. Qty: 30 Tab, Refills: 6      albuterol (PROVENTIL HFA) 90 mcg/Actuation inhaler Take 2 Puffs by inhalation every four (4) hours as needed. Indications: BRONCHOSPASM PREVENTION      omega-3 fatty acids-vitamin e (FISH OIL) 1,000 mg Cap Take 1 Cap by mouth daily. cholesterol         STOP taking these medications       azithromycin (ZITHROMAX) 250 mg tablet Comments:   Reason for Stopping:         glimepiride (AMARYL) 2 mg tablet Comments:   Reason for Stopping:             NOTIFY YOUR PHYSICIAN FOR ANY OF THE FOLLOWING:   Fever over 101 degrees for 24 hours. Chest pain, shortness of breath, fever, chills, nausea, vomiting, diarrhea, change in mentation, falling, weakness, bleeding. Severe pain or pain not relieved by medications. Or, any other signs or symptoms that you may have questions about. DISPOSITION:    Home With:   OT  PT  HH  RN       Long term SNF/Inpatient Rehab   X Independent living    Hospice    Other:       PATIENT CONDITION AT DISCHARGE:     Functional status    Poor     Deconditioned    X Independent      Cognition   X  Lucid     Forgetful     Dementia      Catheters/lines (plus indication)    Rhodes     PICC     PEG    X None      Code status   X  Full code     DNR      PHYSICAL EXAMINATION AT DISCHARGE:  Patient Vitals for the past 24 hrs:   Temp Pulse Resp BP SpO2   04/26/21 1108 98 °F (36.7 °C) 77 18 (!) 148/65 96 %   04/26/21 0851 98.4 °F (36.9 °C) 79 18 (!) 165/68 98 %   04/26/21 0346 97.9 °F (36.6 °C) 72 14 (!) 157/66 100 %   04/25/21 2309 98.5 °F (36.9 °C) 77 18 128/78 100 %   04/25/21 2135     95 %   04/25/21 2048 98.1 °F (36.7 °C) 81 18 (!) 152/75 95 %   04/25/21 1613 98.6 °F (37 °C) 81 16 (!) 179/87 95 %     General:          Alert, cooperative, no distress, appears stated age. HEENT:           Atraumatic, anicteric sclerae, pink conjunctiva,  No oral ulcers,   Neck:               Supple, symmetrical  Lungs:             decreased breath sounds bilaterally. No Wheezing or Rhonchi. No rales. Chest wall:      No tenderness  No Accessory muscle use.   Heart:              Regular  rhythm,  No  murmur   No edema  Abdomen:        Soft, non-tender. Not distended. Bowel sounds normal  Extremities:     No cyanosis. No clubbing,  mild edema  Skin:                Not pale. Not Jaundiced  No rashes   Psych:             Not anxious or agitated.   Neurologic:      Alert, moves all extremities, answers questions appropriately and responds to commands     Recent Results (from the past 24 hour(s))   GLUCOSE, POC    Collection Time: 04/25/21  5:12 PM   Result Value Ref Range    Glucose (POC) 143 (H) 65 - 100 mg/dL    Performed by Yani Clark    GLUCOSE, POC    Collection Time: 04/25/21  9:32 PM   Result Value Ref Range    Glucose (POC) 230 (H) 65 - 100 mg/dL    Performed by Mike Heath    CBC W/O DIFF    Collection Time: 04/26/21  4:15 AM   Result Value Ref Range    WBC 7.9 3.6 - 11.0 K/uL    RBC 4.50 3.80 - 5.20 M/uL    HGB 13.7 11.5 - 16.0 g/dL    HCT 40.4 35.0 - 47.0 %    MCV 89.8 80.0 - 99.0 FL    MCH 30.4 26.0 - 34.0 PG    MCHC 33.9 30.0 - 36.5 g/dL    RDW 13.2 11.5 - 14.5 %    PLATELET 682 503 - 360 K/uL    MPV 9.4 8.9 - 12.9 FL    NRBC 0.0 0  WBC    ABSOLUTE NRBC 0.00 0.00 - 7.16 K/uL   METABOLIC PANEL, BASIC    Collection Time: 04/26/21  4:15 AM   Result Value Ref Range    Sodium 134 (L) 136 - 145 mmol/L    Potassium 4.1 3.5 - 5.1 mmol/L    Chloride 101 97 - 108 mmol/L    CO2 28 21 - 32 mmol/L    Anion gap 5 5 - 15 mmol/L    Glucose 216 (H) 65 - 100 mg/dL    BUN 27 (H) 6 - 20 MG/DL    Creatinine 1.44 (H) 0.55 - 1.02 MG/DL    BUN/Creatinine ratio 19 12 - 20      GFR est AA 43 (L) >60 ml/min/1.73m2    GFR est non-AA 35 (L) >60 ml/min/1.73m2    Calcium 10.4 (H) 8.5 - 10.1 MG/DL   GLUCOSE, POC    Collection Time: 04/26/21  6:39 AM   Result Value Ref Range    Glucose (POC) 194 (H) 65 - 100 mg/dL    Performed by Agustin MALLOY (CON)    GLUCOSE, POC    Collection Time: 04/26/21 11:06 AM   Result Value Ref Range    Glucose (POC) 252 (H) 65 - 100 mg/dL    Performed by [facility] DIAGNOSES:  Problem List as of 4/26/2021 Date Reviewed: 5/15/2020          Codes Class Noted - Resolved    Chest pain ICD-10-CM: R07.9  ICD-9-CM: 786.50  4/20/2021 - Present        Dilation of common bile duct ICD-10-CM: K83.8  ICD-9-CM: 576.8  11/16/2019 - Present        Dizziness and giddiness ICD-10-CM: R42  ICD-9-CM: 780.4  7/21/2015 - Present        COPD exacerbation ([facility] ICD-10-CM: J44.1  ICD-9-CM: 491.21  1/1/2013 - Present        Depression ICD-10-CM: F32.9  ICD-9-CM: 880  2/29/2012 - Present        Vitamin D deficiency ICD-10-CM: E55.9  ICD-9-CM: 268.9  2/29/2012 - Present        Chronic low back pain ICD-10-CM: M54.5, G89.29  ICD-9-CM: 724.2, 338.29  6/8/2011 - Present        DM (diabetes mellitus) ([facility] ICD-10-CM: E11.9  ICD-9-CM: 250.00  6/4/2010 - Present        Hyperlipidemia ICD-10-CM: E78.5  ICD-9-CM: 272.4  6/4/2010 - Present        HTN (hypertension) ICD-10-CM: I10  ICD-9-CM: 401.9  6/4/2010 - Present        RESOLVED: Chest pain ICD-10-CM: R07.9  ICD-9-CM: 786.50  11/13/2016 - 11/16/2016        RESOLVED: Tobacco use disorder ICD-10-CM: U90.801  ICD-9-CM: 305.1  8/27/2010 - 12/24/2013              Greater than 30 minutes were spent with the patient on counseling and coordination of care    Signed:   Lucio Solorzano MD  4/26/2021  12:41 PM   .

## 2021-04-26 NOTE — PROGRESS NOTES
1930 Bedside and Verbal shift change report given to Simon Ying (oncoming nurse) by Charmayne Gallon, RN (offgoing nurse). Report included the following information SBAR, Kardex, MAR and Alarm Parameters . 0730 Bedside and Verbal shift change report given to RN (oncoming nurse) by Lilia Feliciano RN (offgoing nurse). Report included the following information SBAR, Kardex, MAR and Alarm Parameters .

## 2021-04-26 NOTE — PROGRESS NOTES
[facility] visit. Mrs. Amadou Quiles is being discharged today. Prayer and communion offered.       ZEYNEP Ann, RN, ACSW, LCSW   Page:  [phone]

## 2021-04-27 ENCOUNTER — PATIENT OUTREACH (OUTPATIENT)
Dept: CASE MANAGEMENT | Age: 81
End: 2021-04-27

## 2021-05-14 ENCOUNTER — PATIENT OUTREACH (OUTPATIENT)
Dept: CASE MANAGEMENT | Age: 81
End: 2021-05-14

## 2021-05-14 NOTE — PROGRESS NOTES
Patient reports her blood sugar is 246 and she is awaiting call back from endo office. Her insulin is prescribed for 8 units +  sliding scale before each meal.    1506 Patient report blood sugar is now 225 and endo called a increase Lantus at night. She is feeling better than earlier. CTN will follow up in 5-7 days.  SHAQ

## 2021-06-01 ENCOUNTER — PATIENT OUTREACH (OUTPATIENT)
Dept: CASE MANAGEMENT | Age: 81
End: 2021-06-01

## 2021-06-01 NOTE — PROGRESS NOTES
Patient has graduated from the Transitions of Care Coordination  program on 6/1/21. Patient/family has the ability to self-manage at this time Care management goals have been completed. Patient was not referred to the [facility] team for further management. Patient has Care Transition Nurse's contact information for any further questions, concerns, or needs. Patients upcoming visits:  No future appointments.

## 2021-10-27 ENCOUNTER — TELEPHONE (OUTPATIENT)
Dept: CARDIAC REHAB | Age: 81
End: 2021-10-27

## 2021-10-27 NOTE — TELEPHONE ENCOUNTER
Patient was referred to the Cardiopulmonary Rehab here at [facility]. I called MD office to inform them that we are in the process of hiring a new pulmonary therapist and as of right now until one is hired we are not taking any new pulmonary patients.  10/27  --Syed Ramirez

## 2022-01-27 NOTE — Clinical Note
Sheath: removed.   6FR X 10 CM SLENDER GLIDESHEATH REMOVED AND TR BAND PLACED ON PATIENTS RIGHT WRIST AND INFLATED WITH 13 CC OF AIR Detail Level: Zone

## 2022-02-01 ENCOUNTER — TRANSCRIBE ORDER (OUTPATIENT)
Dept: SCHEDULING | Age: 82
End: 2022-02-01

## 2022-02-01 DIAGNOSIS — Z87.891 PERSONAL HISTORY OF TOBACCO USE, PRESENTING HAZARDS TO HEALTH: Primary | ICD-10-CM

## 2022-02-02 ENCOUNTER — TRANSCRIBE ORDER (OUTPATIENT)
Dept: SCHEDULING | Age: 82
End: 2022-02-02

## 2022-02-02 DIAGNOSIS — Z87.891 PERSONAL HISTORY OF NICOTINE DEPENDENCE: Primary | ICD-10-CM

## 2022-02-09 ENCOUNTER — TRANSCRIBE ORDER (OUTPATIENT)
Dept: CARDIAC REHAB | Age: 82
End: 2022-02-09

## 2022-02-09 DIAGNOSIS — J44.9 CHRONIC OBSTRUCTIVE PULMONARY DISEASE, UNSPECIFIED COPD TYPE (HCC): ICD-10-CM

## 2022-02-09 DIAGNOSIS — J44.9 OBSTRUCTIVE CHRONIC BRONCHITIS WITHOUT EXACERBATION (HCC): Primary | ICD-10-CM

## 2022-03-01 ENCOUNTER — TRANSCRIBE ORDER (OUTPATIENT)
Dept: CARDIAC REHAB | Age: 82
End: 2022-03-01

## 2022-03-01 DIAGNOSIS — J44.9 CHRONIC OBSTRUCTIVE PULMONARY DISEASE, UNSPECIFIED COPD TYPE (HCC): Primary | ICD-10-CM

## 2022-03-18 PROBLEM — K83.8 DILATION OF COMMON BILE DUCT: Status: ACTIVE | Noted: 2019-11-16

## 2022-03-18 PROBLEM — R07.9 CHEST PAIN: Status: ACTIVE | Noted: 2021-04-20

## 2022-04-13 ENCOUNTER — TRANSCRIBE ORDER (OUTPATIENT)
Dept: SCHEDULING | Age: 82
End: 2022-04-13

## 2022-04-13 DIAGNOSIS — E21.0 PRIMARY HYPERPARATHYROIDISM (HCC): Primary | ICD-10-CM

## 2022-04-20 ENCOUNTER — HOSPITAL ENCOUNTER (OUTPATIENT)
Dept: ULTRASOUND IMAGING | Age: 82
Discharge: HOME | End: 2022-04-20
Attending: INTERNAL MEDICINE
Payer: MEDICARE

## 2022-04-20 DIAGNOSIS — E21.0 PRIMARY HYPERPARATHYROIDISM (HCC): ICD-10-CM

## 2022-04-20 PROCEDURE — 76536 US EXAM OF HEAD AND NECK: CPT

## 2022-05-27 ENCOUNTER — TRANSCRIBE ORDER (OUTPATIENT)
Dept: SCHEDULING | Age: 82
End: 2022-05-27

## 2022-05-27 DIAGNOSIS — J44.9 CHRONIC OBSTRUCTIVE PULMONARY DISEASE, UNSPECIFIED COPD TYPE (HCC): Primary | ICD-10-CM

## 2022-06-22 ENCOUNTER — HOSPITAL ENCOUNTER (EMERGENCY)
Age: 82
Discharge: HOME | End: 2022-06-22
Attending: EMERGENCY MEDICINE
Payer: MEDICARE

## 2022-06-22 VITALS
SYSTOLIC BLOOD PRESSURE: 166 MMHG | HEART RATE: 95 BPM | DIASTOLIC BLOOD PRESSURE: 79 MMHG | RESPIRATION RATE: 16 BRPM | OXYGEN SATURATION: 96 % | TEMPERATURE: 98.6 F

## 2022-06-22 DIAGNOSIS — N30.90 CYSTITIS: Primary | ICD-10-CM

## 2022-06-22 LAB
ALBUMIN SERPL-MCNC: 4 G/DL (ref 3.5–5)
ALBUMIN/GLOB SERPL: 1.2 {RATIO} (ref 1.1–2.2)
ALP SERPL-CCNC: 74 U/L (ref 45–117)
ALT SERPL-CCNC: 22 U/L (ref 12–78)
ANION GAP SERPL CALC-SCNC: 4 MMOL/L (ref 5–15)
APPEARANCE UR: ABNORMAL
AST SERPL-CCNC: 15 U/L (ref 15–37)
BACTERIA URNS QL MICRO: ABNORMAL /HPF
BASOPHILS # BLD: 0 K/UL (ref 0–0.1)
BASOPHILS NFR BLD: 0 % (ref 0–1)
BILIRUB SERPL-MCNC: 0.6 MG/DL (ref 0.2–1)
BILIRUB UR QL: NEGATIVE
BUN SERPL-MCNC: 21 MG/DL (ref 6–20)
BUN/CREAT SERPL: 17 (ref 12–20)
CALCIUM SERPL-MCNC: 10.6 MG/DL (ref 8.5–10.1)
CHLORIDE SERPL-SCNC: 97 MMOL/L (ref 97–108)
CO2 SERPL-SCNC: 33 MMOL/L (ref 21–32)
COLOR UR: ABNORMAL
COMMENT, HOLDF: NORMAL
COMMENT, HOLDF: NORMAL
COVID-19 RAPID TEST, COVR: NOT DETECTED
CREAT SERPL-MCNC: 1.27 MG/DL (ref 0.55–1.02)
DIFFERENTIAL METHOD BLD: ABNORMAL
EOSINOPHIL # BLD: 0.1 K/UL (ref 0–0.4)
EOSINOPHIL NFR BLD: 1 % (ref 0–7)
EPITH CASTS URNS QL MICRO: ABNORMAL /LPF
ERYTHROCYTE [DISTWIDTH] IN BLOOD BY AUTOMATED COUNT: 13.5 % (ref 11.5–14.5)
GLOBULIN SER CALC-MCNC: 3.3 G/DL (ref 2–4)
GLUCOSE SERPL-MCNC: 158 MG/DL (ref 65–100)
GLUCOSE UR STRIP.AUTO-MCNC: NEGATIVE MG/DL
HCT VFR BLD AUTO: 40.8 % (ref 35–47)
HGB BLD-MCNC: 13.8 G/DL (ref 11.5–16)
HGB UR QL STRIP: ABNORMAL
HYALINE CASTS URNS QL MICRO: ABNORMAL /LPF (ref 0–5)
IMM GRANULOCYTES # BLD AUTO: 0.1 K/UL (ref 0–0.04)
IMM GRANULOCYTES NFR BLD AUTO: 1 % (ref 0–0.5)
KETONES UR QL STRIP.AUTO: ABNORMAL MG/DL
LEUKOCYTE ESTERASE UR QL STRIP.AUTO: ABNORMAL
LYMPHOCYTES # BLD: 1.5 K/UL (ref 0.8–3.5)
LYMPHOCYTES NFR BLD: 18 % (ref 12–49)
MCH RBC QN AUTO: 30.7 PG (ref 26–34)
MCHC RBC AUTO-ENTMCNC: 33.8 G/DL (ref 30–36.5)
MCV RBC AUTO: 90.9 FL (ref 80–99)
MONOCYTES # BLD: 0.9 K/UL (ref 0–1)
MONOCYTES NFR BLD: 11 % (ref 5–13)
NEUTS SEG # BLD: 5.7 K/UL (ref 1.8–8)
NEUTS SEG NFR BLD: 69 % (ref 32–75)
NITRITE UR QL STRIP.AUTO: NEGATIVE
NRBC # BLD: 0 K/UL (ref 0–0.01)
NRBC BLD-RTO: 0 PER 100 WBC
PH UR STRIP: 5.5 [PH] (ref 5–8)
PLATELET # BLD AUTO: 175 K/UL (ref 150–400)
PMV BLD AUTO: 10.4 FL (ref 8.9–12.9)
POTASSIUM SERPL-SCNC: 4.4 MMOL/L (ref 3.5–5.1)
PROT SERPL-MCNC: 7.3 G/DL (ref 6.4–8.2)
PROT UR STRIP-MCNC: 30 MG/DL
RBC # BLD AUTO: 4.49 M/UL (ref 3.8–5.2)
RBC #/AREA URNS HPF: ABNORMAL /HPF (ref 0–5)
SAMPLES BEING HELD,HOLD: NORMAL
SAMPLES BEING HELD,HOLD: NORMAL
SODIUM SERPL-SCNC: 134 MMOL/L (ref 136–145)
SOURCE, COVRS: NORMAL
SP GR UR REFRACTOMETRY: 1.02 (ref 1–1.03)
UR CULT HOLD, URHOLD: NORMAL
UROBILINOGEN UR QL STRIP.AUTO: 0.2 EU/DL (ref 0.2–1)
WBC # BLD AUTO: 8.2 K/UL (ref 3.6–11)
WBC URNS QL MICRO: >100 /HPF (ref 0–4)

## 2022-06-22 PROCEDURE — 81001 URINALYSIS AUTO W/SCOPE: CPT

## 2022-06-22 PROCEDURE — 85025 COMPLETE CBC W/AUTO DIFF WBC: CPT

## 2022-06-22 PROCEDURE — 74011250636 HC RX REV CODE- 250/636: Performed by: EMERGENCY MEDICINE

## 2022-06-22 PROCEDURE — 87635 SARS-COV-2 COVID-19 AMP PRB: CPT

## 2022-06-22 PROCEDURE — 87086 URINE CULTURE/COLONY COUNT: CPT

## 2022-06-22 PROCEDURE — 74011250637 HC RX REV CODE- 250/637: Performed by: EMERGENCY MEDICINE

## 2022-06-22 PROCEDURE — 99284 EMERGENCY DEPT VISIT MOD MDM: CPT

## 2022-06-22 PROCEDURE — 80053 COMPREHEN METABOLIC PANEL: CPT

## 2022-06-22 PROCEDURE — 36415 COLL VENOUS BLD VENIPUNCTURE: CPT

## 2022-06-22 RX ORDER — CEPHALEXIN 500 MG/1
500 CAPSULE ORAL 3 TIMES DAILY
Qty: 21 CAPSULE | Refills: 0 | Status: SHIPPED | OUTPATIENT
Start: 2022-06-22 | End: 2022-06-29

## 2022-06-22 RX ORDER — CEPHALEXIN 500 MG/1
500 CAPSULE ORAL
Status: COMPLETED | OUTPATIENT
Start: 2022-06-22 | End: 2022-06-22

## 2022-06-22 RX ADMIN — CEPHALEXIN 500 MG: 500 CAPSULE ORAL at 06:07

## 2022-06-22 RX ADMIN — SODIUM CHLORIDE 1000 ML: 9 INJECTION, SOLUTION INTRAVENOUS at 06:07

## 2022-06-22 NOTE — ED TRIAGE NOTES
Triage: Pt arrives via EMS with CC of generalized weakness and nausea for 2 weeks. Pt denies fever. Denies SOB. Oxygen saturations 96% on 3L NC which she wears at baseline.

## 2022-06-22 NOTE — ED PROVIDER NOTES
74-year-old female with past medical history significant for COPD, coronary artery disease, diabetes, hypertension, obesity presents with complaints of 2 weeks nausea associated with generalized weakness after trip and fall 2 weeks ago. Patient reports she was initially evaluated at urgent care center in and had x-rays of her right wrist.  Patient reports x-ray was negative but they splinted her wrist for pain control. Denies cough, shortness of breath, chest pain, vomiting, diarrhea, constipation. Patient also reports urinary frequency and urgency. Patient is concerned about dehydration. Denies fever. Chronic oxygen at 3 L nasal cannula for COPD. Denies head trauma, loss of consciousness.     Former smoker  Denies drug and alcohol use  Primary care-[facility]           Past Medical History:   Diagnosis Date    Anemia NEC     Arthritis     Asthma     CAD (coronary artery disease)     NSTEMI following PCI    Calculus of kidney 7-    [facility]    Cancer ([facility]     skin    Chronic pain     degenerative disc disease, lower right back    COPD     Depression     Diabetes ([address]     GERD (gastroesophageal reflux disease)     Hx of mammogram 11/29/2017    [facility] - No mammographic evidence of malignancy - annual follow up recommended     Hypertension     2005 Dx    Joint pain     Morbid obesity ([address]     Psychiatric disorder     depression    Rheumatic fever     as a child    Sleep apnea     on CPAP    Sleep disorder        Past Surgical History:   Procedure Laterality Date    COLONOSCOPY N/A 7/30/2019    COLONOSCOPY performed by Milton Pittman MD at [address] [place] [address]  7/30/2019         HX APPENDECTOMY      in 30's    HX CHOLECYSTECTOMY      with stent placement    HX CORONARY STENT PLACEMENT  dec 2013    HX HYSTERECTOMY      HX LITHOTRIPSY      HX TRABECULECTOMY      HX TUBAL LIGATION      [place] CARDIAC SURG PROCEDURE UNLIST      4 stents    [place] ERCP REMOVE CALCULI/DEBRIS BILIARY/PANCREAS DUCT  5/15/2020         [place] ERCP REMOVE FOREIGN BODY/STENT BILIARY/PANC DUCT  5/15/2020         [place] ERCP W/SPHINCTEROTOMY/PAPILLOTOMY  5/15/2020         UPPER GI ENDOSCOPY,BIOPSY  2019         UPPER GI ENDOSCOPY,BIOPSY  5/15/2020              Family History:   Problem Relation Age of Onset    Arthritis-rheumatoid Sister     Osteoporosis Sister     Diabetes Brother     Stroke Brother     Elevated Lipids Mother     Alcohol abuse Father     Elevated Lipids Father     Cancer Sister     Diabetes Sister     Cancer Sister     Diabetes Brother     Diabetes Brother     Heart Disease Brother     Diabetes Brother     Diabetes Brother     Heart Disease Brother     Diabetes Maternal Aunt        Social History     Socioeconomic History    Marital status:      Spouse name: Not on file    Number of children: Not on file    Years of education: Not on file    Highest education level: Not on file   Occupational History    Not on file   Tobacco Use    Smoking status: Former Smoker     Packs/day: 1.00     Years: 45.00     Pack years: 45.00     Quit date: 2013     Years since quittin.4    Smokeless tobacco: Never Used   Substance and Sexual Activity    Alcohol use: No    Drug use: No    Sexual activity: Never   Other Topics Concern    Not on file   Social History Narrative    Not on file     Social Determinants of Health     Financial Resource Strain:     Difficulty of Paying Living Expenses: Not on file   Food Insecurity:     Worried About Running Out of Food in the Last Year: Not on file    Tata of Food in the Last Year: Not on file   Transportation Needs:     Lack of Transportation (Medical): Not on file    Lack of Transportation (Non-Medical):  Not on file   Physical Activity:     Days of Exercise per Week: Not on file    Minutes of Exercise per Session: Not on file   Stress:     Feeling of Stress : Not on file   Social Connections:  Frequency of Communication with Friends and Family: Not on file    Frequency of Social Gatherings with Friends and Family: Not on file    Attends [employer] Services: Not on file    Active Member of Clubs or Organizations: Not on file    Attends Club or Organization Meetings: Not on file    Marital Status: Not on file   Intimate Partner Violence:     Fear of Current or Ex-Partner: Not on file    Emotionally Abused: Not on file    Physically Abused: Not on file    Sexually Abused: Not on file   Housing Stability:     Unable to Pay for Housing in the Last Year: Not on file    Number of [place] in the Last Year: Not on file    Unstable Housing in the Last Year: Not on file         ALLERGIES: Codeine, Crestor [rosuvastatin], Lipitor [atorvastatin], and Tetanus vaccines and toxoid    Review of Systems   Constitutional: Positive for fatigue. Negative for chills and fever. HENT: Negative for congestion, nosebleeds and rhinorrhea. Eyes: Negative for pain and redness. Respiratory: Negative for cough and shortness of breath. Cardiovascular: Negative for chest pain and palpitations. Gastrointestinal: Positive for nausea. Negative for abdominal pain and vomiting. Genitourinary: Positive for frequency and urgency. Negative for dysuria, vaginal bleeding and vaginal pain. Musculoskeletal: Positive for myalgias. Skin: Negative for rash and wound. Neurological: Negative for seizures, syncope and weakness. Hematological: Does not bruise/bleed easily. Psychiatric/Behavioral: Negative for agitation, confusion, dysphoric mood and suicidal ideas. The patient is not nervous/anxious. Vitals:    06/22/22 0320   BP: (!) 166/79   Pulse: 95   Resp: 16   Temp: 98.6 °F (37 °C)   SpO2: 96%            Physical Exam  Vitals and nursing note reviewed. Constitutional:       Appearance: She is well-developed. HENT:      Head: Normocephalic and atraumatic.    Eyes:      Pupils: Pupils are equal, round, and reactive to light. Neck:      Trachea: No tracheal deviation. Cardiovascular:      Rate and Rhythm: Normal rate and regular rhythm. Heart sounds: Normal heart sounds. Pulmonary:      Effort: Pulmonary effort is normal. No respiratory distress. Breath sounds: Normal breath sounds. No stridor. No wheezing or rales. Chest:      Chest wall: No tenderness. Abdominal:      General: Bowel sounds are normal. There is no distension. Palpations: Abdomen is soft. Tenderness: There is no abdominal tenderness. There is no rebound. Musculoskeletal:         General: No tenderness. Normal range of motion. Cervical back: Normal range of motion and neck supple. Comments: Right wrist with fiberglass splint in place   Skin:     General: Skin is warm and dry. Coloration: Skin is not pale. Findings: No rash. Neurological:      Mental Status: She is alert and oriented to person, place, and time. Cranial Nerves: No cranial nerve deficit. MDM  Number of Diagnoses or Management Options  Cystitis  Diagnosis management comments: 37-year-old female generalized nausea x2 weeks and urinary frequency and urgency. Patient is afebrile, nontoxic, hemodynamically stable, no respiratory distress, clear to auscultation bilaterally, normal oxygen saturation. Plan- UA, CBC/CMP, IV fluid hydration, COVID screen. Labs remarkable for urinary tract infection       Amount and/or Complexity of Data Reviewed  Clinical lab tests: ordered and reviewed  Tests in the radiology section of CPT®: ordered and reviewed    Patient Progress  Patient progress: improved         Procedures      5:59 AM  Patient's results have been reviewed with them.   Patient and/or family have verbally conveyed their understanding and agreement of the patient's signs, symptoms, diagnosis, treatment and prognosis and additionally agree to follow up as recommended or return to the Emergency Room should their condition change prior to follow-up. Discharge instructions have also been provided to the patient with some educational information regarding their diagnosis as well a list of reasons why they would want to return to the ER prior to their follow-up appointment should their condition change.

## 2022-06-23 LAB
BACTERIA SPEC CULT: NORMAL
CC UR VC: NORMAL
SERVICE CMNT-IMP: NORMAL

## 2023-02-01 ENCOUNTER — APPOINTMENT (OUTPATIENT)
Dept: CT IMAGING | Age: 83
End: 2023-02-01
Attending: EMERGENCY MEDICINE
Payer: MEDICARE

## 2023-02-01 ENCOUNTER — APPOINTMENT (OUTPATIENT)
Dept: GENERAL RADIOLOGY | Age: 83
End: 2023-02-01
Attending: EMERGENCY MEDICINE
Payer: MEDICARE

## 2023-02-01 ENCOUNTER — HOSPITAL ENCOUNTER (EMERGENCY)
Age: 83
Discharge: HOME | End: 2023-02-01
Attending: EMERGENCY MEDICINE
Payer: MEDICARE

## 2023-02-01 VITALS
TEMPERATURE: 98.3 F | RESPIRATION RATE: 18 BRPM | SYSTOLIC BLOOD PRESSURE: 104 MMHG | OXYGEN SATURATION: 96 % | DIASTOLIC BLOOD PRESSURE: 60 MMHG | HEART RATE: 83 BPM

## 2023-02-01 DIAGNOSIS — S63.8X1A TEAR OF RIGHT SCAPHOLUNATE LIGAMENT, INITIAL ENCOUNTER: ICD-10-CM

## 2023-02-01 DIAGNOSIS — S00.03XA SCALP HEMATOMA, INITIAL ENCOUNTER: Primary | ICD-10-CM

## 2023-02-01 PROCEDURE — 72125 CT NECK SPINE W/O DYE: CPT

## 2023-02-01 PROCEDURE — 73110 X-RAY EXAM OF WRIST: CPT

## 2023-02-01 PROCEDURE — 74011250637 HC RX REV CODE- 250/637: Performed by: EMERGENCY MEDICINE

## 2023-02-01 PROCEDURE — 70450 CT HEAD/BRAIN W/O DYE: CPT

## 2023-02-01 PROCEDURE — 99284 EMERGENCY DEPT VISIT MOD MDM: CPT

## 2023-02-01 PROCEDURE — 73562 X-RAY EXAM OF KNEE 3: CPT

## 2023-02-01 RX ORDER — ACETAMINOPHEN 325 MG/1
650 TABLET ORAL
Status: COMPLETED | OUTPATIENT
Start: 2023-02-01 | End: 2023-02-01

## 2023-02-01 RX ADMIN — ACETAMINOPHEN 650 MG: 325 TABLET ORAL at 14:09

## 2023-02-01 NOTE — ED TRIAGE NOTES
Pt reports falling last night. Pt fell forward on her hands and knees, Hit her posterior head on the door railing.

## 2023-02-01 NOTE — ED PROVIDER NOTES
82-year-old female presents with multiple complaints after a fall last night. Patient tripped while using her walker. She did hit the back of her head. She complains also of right wrist pain and bilateral knee pain. She took Tylenol last night with some relief. She has not taken any medication today.        Past Medical History:   Diagnosis Date    Anemia NEC     Arthritis     Asthma     CAD (coronary artery disease)     NSTEMI following PCI    Calculus of kidney 7-    [address] [place] [address]    Cancer ([address]     skin    Chronic pain     degenerative disc disease, lower right back    COPD     Depression     Diabetes ([address]     GERD (gastroesophageal reflux disease)     Hx of mammogram 11/29/2017    [facility] - No mammographic evidence of malignancy - annual follow up recommended     Hypertension     2005 Dx    Joint pain     Morbid obesity ([address]     Psychiatric disorder     depression    Rheumatic fever     as a child    Sleep apnea     on CPAP    Sleep disorder        Past Surgical History:   Procedure Laterality Date    COLONOSCOPY N/A 7/30/2019    COLONOSCOPY performed by Mike Razo MD at [facility] ENDOSCOPY    COLONOSCOPY,CASSIDY Brown  7/30/2019         HX APPENDECTOMY      in 29's    HX CHOLECYSTECTOMY      with stent placement    HX CORONARY STENT PLACEMENT  dec 2013    HX HYSTERECTOMY      HX LITHOTRIPSY      HX TRABECULECTOMY      HX TUBAL LIGATION      RI CARDIAC SURG PROCEDURE UNLIST      4 stents    RI ERCP REMOVE CALCULI/DEBRIS BILIARY/PANCREAS DUCT  5/15/2020         RI ERCP REMOVE FOREIGN BODY/STENT BILIARY/PANC DUCT  5/15/2020         RI ERCP W/SPHINCTEROTOMY/PAPILLOTOMY  5/15/2020         UPPER GI ENDOSCOPY,BIOPSY  7/30/2019         UPPER GI ENDOSCOPY,BIOPSY  5/15/2020              Family History:   Problem Relation Age of Onset    Arthritis-rheumatoid Sister     Osteoporosis Sister     Diabetes Brother     Stroke Brother     Elevated Lipids Mother     Alcohol abuse Father     Elevated Lipids Father     Cancer Sister     Diabetes Sister     Cancer Sister     Diabetes Brother     Diabetes Brother     Heart Disease Brother     Diabetes Brother     Diabetes Brother     Heart Disease Brother     Diabetes Maternal Aunt        Social History     Socioeconomic History    Marital status:      Spouse name: Not on file    Number of children: Not on file    Years of education: Not on file    Highest education level: Not on file   Occupational History    Not on file   Tobacco Use    Smoking status: Former     Packs/day: 1.00     Years: 45.00     Pack years: 45.00     Types: Cigarettes     Quit date: 1/1/2013     Years since quitting: 10.0    Smokeless tobacco: Never   Substance and Sexual Activity    Alcohol use: No    Drug use: No    Sexual activity: Never   Other Topics Concern    Not on file   Social History Narrative    Not on file     Social Determinants of Health     Financial Resource Strain: Not on file   Food Insecurity: Not on file   Transportation Needs: Not on file   Physical Activity: Not on file   Stress: Not on file   Social Connections: Not on file   Intimate Partner Violence: Not on file   Housing Stability: Not on file         ALLERGIES: Codeine, Crestor [rosuvastatin], Lipitor [atorvastatin], and Tetanus vaccines and toxoid    Review of Systems   Constitutional:  Negative for fever. Musculoskeletal:  Positive for arthralgias. Neurological:  Positive for headaches. There were no vitals filed for this visit. Physical Exam  Vitals and nursing note reviewed. Constitutional:       General: She is not in acute distress. Appearance: Normal appearance. She is not ill-appearing, toxic-appearing or diaphoretic. HENT:      Head: Normocephalic and atraumatic. Eyes:      Extraocular Movements: Extraocular movements intact. Cardiovascular:      Rate and Rhythm: Normal rate. Pulses: Normal pulses.    Pulmonary:      Effort: Pulmonary effort is normal. No respiratory distress. Abdominal:      General: There is no distension. Musculoskeletal:         General: Normal range of motion. Cervical back: Normal range of motion. Skin:     General: Skin is dry. Neurological:      Mental Status: She is alert and oriented to person, place, and time. Psychiatric:         Mood and Affect: Mood normal.        Medical Decision Making      Patient presents with multiple complaints after a recent fall. Multiple plain film and CT images obtained to rule out traumatic injury. I have independently reviewed the obtained radiographic images and note no fracture on plain film of the right wrist, right knee or left knee. No intracranial hemorrhage on noncontrast CT head. Will await formal radiology read. Radiology confirms no acute abnormalities with the exception of suggestion of a scapholunate ligament injury which appears chronic. Patient placed in thumb spica splint and referred to Ortho. Discussed my clinical impression(s), any labs and/or radiology results with the patient. I answered any questions and addressed any concerns. Discussed the importance of following up with their primary care physician and/or specialist(s). Discussed signs or symptoms that would warrant return back to the ER for further evaluation. The patient is agreeable with discharge. Amount and/or Complexity of Data Reviewed  Radiology: ordered. Risk  OTC drugs.            Procedures

## 2023-02-01 NOTE — DISCHARGE INSTRUCTIONS
Thank you for allowing us to provide you with medical care today. We realize that you have many choices for your emergency care needs. We thank you for choosing [facility]. Please choose us in the future for any continued health care needs. The exam and treatment you received in the Emergency Department were for an emergent problem and are not intended as complete care. It is important that you follow up with a doctor, nurse practitioner, or physician's assistant for ongoing care. If your symptoms worsen or you do not improve as expected and you are unable to reach your usual health care provider, you should return to the Emergency Department. We are available 24 hours a day. Please make an appointment with your health care provider(s) for follow up of your Emergency Department visit. Take this sheet with you when you go to your follow-up visit.

## 2023-02-01 NOTE — ED NOTES
The patient was discharged home by provider in stable condition. The patient is alert and oriented, in no respiratory distress and discharge vital signs obtained. The patient's diagnosis, condition and treatment were explained. The patient expressed understanding. No prescriptions given. No work/school note given. A discharge plan has been developed. A  was not involved in the process. Aftercare instructions were given. Pt discharged from the ED via w/c by  RN with family.

## 2023-04-23 DIAGNOSIS — J44.9 CHRONIC OBSTRUCTIVE PULMONARY DISEASE, UNSPECIFIED COPD TYPE (HCC): Primary | ICD-10-CM

## (undated) DEVICE — TUBING INSUF 0.3UM FLTR W/ LUERLOCK CONN

## (undated) DEVICE — APPLIER CLP M/L SHFT DIA5MM 15 LIG LIGAMAX 5

## (undated) DEVICE — GARMENT,MEDLINE,DVT,INT,CALF,MED, GEN2: Brand: MEDLINE

## (undated) DEVICE — AGENT HEMSTAT 3GM PURIFIED PLNT STARCH PWD ABSRB ARISTA AH

## (undated) DEVICE — TROCAR: Brand: KII® OPTICAL ACCESS SYSTEM

## (undated) DEVICE — SET ADMIN 16ML TBNG L100IN 2 Y INJ SITE IV PIGGY BK DISP

## (undated) DEVICE — HI-TORQUE VERSACORE MODIFIED J GUIDE WIRE SYSTEM 145 CM: Brand: HI-TORQUE VERSACORE

## (undated) DEVICE — SYR 20ML LL STRL LF --

## (undated) DEVICE — NDL PRT INJ NSAF BLNT 18GX1.5 --

## (undated) DEVICE — INSUFFLATION NEEDLE TO ESTABLISH PNEUMOPERITONEUM.: Brand: INSUFFLATION NEEDLE

## (undated) DEVICE — NEEDLE HYPO 22GA L1.5IN BLK S STL HUB POLYPR SHLD REG BVL

## (undated) DEVICE — ANGIOGRAPHIC CATHETER: Brand: IMPULSE™

## (undated) DEVICE — REM POLYHESIVE ADULT PATIENT RETURN ELECTRODE: Brand: VALLEYLAB

## (undated) DEVICE — FCPS RMFG RAD JAW 4LC 160CM --

## (undated) DEVICE — GUIDEWIRE ENDOSCP L2700MM DIA0025IN ANG DST TIP EXCELLENT

## (undated) DEVICE — TR BAND RADIAL ARTERY COMPRESSION DEVICE: Brand: TR BAND

## (undated) DEVICE — KENDALL RADIOLUCENT FOAM MONITORING ELECTRODE RECTANGULAR SHAPE: Brand: KENDALL

## (undated) DEVICE — STERILE POLYISOPRENE POWDER-FREE SURGICAL GLOVES WITH EMOLLIENT COATING: Brand: PROTEXIS

## (undated) DEVICE — CONTAINER SPEC 20 ML LID NEUT BUFF FORMALIN 10 % POLYPR STS

## (undated) DEVICE — 3M™ TEGADERM™ TRANSPARENT FILM DRESSING FRAME STYLE, 1626W, 4 IN X 4-3/4 IN (10 CM X 12 CM), 50/CT 4CT/CASE: Brand: 3M™ TEGADERM™

## (undated) DEVICE — STRAINER URIN CALC RNL MSH -- CONVERT TO ITEM 357634

## (undated) DEVICE — Device

## (undated) DEVICE — KIT MFLD ISOLATN NACL CNTRST PRT TBNG SPIK W/ PRSS TRNSDUC

## (undated) DEVICE — KIT MED IMAG CNTRST AGNT W/ IOPAMIDOL REUSE

## (undated) DEVICE — NEONATAL-ADULT SPO2 SENSOR: Brand: NELLCOR

## (undated) DEVICE — CATH IV AUTOGRD BC PNK 20GA 25 -- INSYTE

## (undated) DEVICE — STRAP,POSITIONING,KNEE/BODY,FOAM,4X60": Brand: MEDLINE

## (undated) DEVICE — SUTURE MCRYL SZ 4-0 L27IN ABSRB UD L19MM PS-2 1/2 CIR PRIM Y426H

## (undated) DEVICE — FORCEPS BX L160CM DIA8MM GRSP DISECT CUP TIP NONLOCKING ROT

## (undated) DEVICE — TOWEL 4 PLY TISS 19X30 SUE WHT

## (undated) DEVICE — GUIDEWIRE ENDOSCP L260CM DIA0.035IN BILI STD HI PERF STR

## (undated) DEVICE — TISSUE RETRIEVAL SYSTEM: Brand: INZII RETRIEVAL SYSTEM

## (undated) DEVICE — GARMENT,MEDLINE,DVT,INT,CALF,LG, GEN2: Brand: MEDLINE

## (undated) DEVICE — PACK PROCEDURE SURG HRT CATH

## (undated) DEVICE — DRAPE,UTILTY,TAPE,15X26, 4EA/PK: Brand: MEDLINE

## (undated) DEVICE — SPHINCTEROTOME: Brand: DREAMTOME™ RX 44

## (undated) DEVICE — CLICKLINE SCISSORS INSERT: Brand: CLICKLINE

## (undated) DEVICE — INFECTION CONTROL KIT SYS

## (undated) DEVICE — TRAP SUC MUCOUS 70ML -- MEDICHOICE MEDLINE

## (undated) DEVICE — STAPLER INT L340MM 45MM STD 12 FIRING B FRM PWR + GRIPPING

## (undated) DEVICE — SUTURE VCRL SZ 3-0 L27IN ABSRB UD L26MM SH 1/2 CIR J416H

## (undated) DEVICE — KIT,1200CC CANISTER,3/16"X6' TUBING: Brand: MEDLINE INDUSTRIES, INC.

## (undated) DEVICE — RELOAD INT H1.8-3.8XL45MM REG THCK TISS G W/ GRIPPING SURF

## (undated) DEVICE — FALCON® SAMPLE CONTAINER, WITH LID, 4.5OZ (110ML), INDIVIDUALLY WRAPPED, STERILE, 100/CASE: Brand: FALCON A CORNING BRAND

## (undated) DEVICE — ANGIOGRAPHY KIT

## (undated) DEVICE — APPLICATOR SURG L38CM RIG ATOMIZING SGL USE XL-R FLEXITIP

## (undated) DEVICE — APPLICATOR BNDG 1MM ADH PREMIERPRO EXOFIN

## (undated) DEVICE — TROCAR: Brand: KII® SLEEVE

## (undated) DEVICE — GUIDEWIRE VASC L260CM DIA0.035IN TIP L3MM STD EXCHG PTFE J

## (undated) DEVICE — SNARE ENDOSCP M L240CM W27MM SHTH DIA2.4MM CHN 2.8MM OVL

## (undated) DEVICE — SPECIAL PROCEDURE DRAPE 32" X 34": Brand: SPECIAL PROCEDURE DRAPE

## (undated) DEVICE — CANISTER, RIGID, 3000CC: Brand: MEDLINE INDUSTRIES, INC.

## (undated) DEVICE — SYR 3ML LL TIP 1/10ML GRAD --

## (undated) DEVICE — SPLINT WR POS F/ARTERIAL ACC -- BX/10

## (undated) DEVICE — SURGICAL PROCEDURE KIT GEN LAPAROSCOPY LF

## (undated) DEVICE — KIT HND CTRL 3 W STPCOCK ROT END 54IN PREM HI PRSS TBNG AT

## (undated) DEVICE — MEDI-VAC YANK SUCT HNDL W/TPRD BULBOUS TIP: Brand: CARDINAL HEALTH

## (undated) DEVICE — BITE BLK ENDOSCP AD 54FR GRN POLYETH ENDOSCP W STRP SLD

## (undated) DEVICE — DEVICE LCK BILI RAP EXCHG OLPS --

## (undated) DEVICE — TISSEEL VHSD 10 ML KT 1504516] BAXTER BIOSURGERY]

## (undated) DEVICE — GUIDEWIRE ENDOSCP L260CM DIA0035IN STD HYDRPHLC STR TIP

## (undated) DEVICE — LAPAROSCOPIC TROCAR SLEEVE/SINGLE USE: Brand: KII® SLEEVE

## (undated) DEVICE — WASTE KIT - ST MARY: Brand: MEDLINE INDUSTRIES, INC.

## (undated) DEVICE — RETRIEVAL BALLOON CATHETER: Brand: EXTRACTOR™ PRO RX

## (undated) DEVICE — BAG SPEC BIOHZRD 10 X 10 IN --

## (undated) DEVICE — NEEDLE HYPO 18GA L1.5IN PNK S STL HUB POLYPR SHLD REG BVL

## (undated) DEVICE — SOLIDIFIER MEDC 1200ML -- CONVERT TO 356117

## (undated) DEVICE — E-Z CLEAN, PTFE COATED, ELECTROSURGICAL LAPAROSCOPIC ELECTRODE, L-HOOK, 33 CM., SINGLE-USE, FOR USE WITH HAND CONTROL PENCIL: Brand: MEGADYNE

## (undated) DEVICE — SUTURE SZ 0 27IN 5/8 CIR UR-6  TAPER PT VIOLET ABSRB VICRYL J603H

## (undated) DEVICE — LAPAROSCOPIC TROCAR SLEEVE/SINGLE USE: Brand: KII® OPTICAL ACCESS SYSTEM

## (undated) DEVICE — SOLUTION IV 1000ML 0.9% SOD CHL

## (undated) DEVICE — ROYAL SILK SURGICAL GOWN, XXL: Brand: CONVERTORS

## (undated) DEVICE — APPLICATOR ENDOSCP 30 CM W/ SNAP LCK DUPLOSPRAY MIS

## (undated) DEVICE — SPHINCTEROTOME: Brand: JAGTOME RX 49

## (undated) DEVICE — GUIDEWIRE ENDOSCP DIA0.025IN L270CM HYDRPHLC STR TIP RG

## (undated) DEVICE — Z DISCONTINUED PER MEDLINE LINE GAS SAMPLING O2/CO2 LNG AD 13 FT NSL W/ TBNG FILTERLINE

## (undated) DEVICE — 1200 GUARD II KIT W/5MM TUBE W/O VAC TUBE: Brand: GUARDIAN

## (undated) DEVICE — RESERVOIR,SUCTION,100CC,SILICONE: Brand: MEDLINE

## (undated) DEVICE — RELOAD STPL L45MM H2-4.1MM THCK TISS GRN GRIPPING SURF B

## (undated) DEVICE — (D)PREP SKN CHLRAPRP APPL 26ML -- CONVERT TO ITEM 371833

## (undated) DEVICE — DRAIN SURG 19FR 100% SIL RADPQ RND CHN FULL FLUT

## (undated) DEVICE — BASIN EMSIS 16OZ GRAPHITE PLAS KID SHP MOLD GRAD FOR ORAL

## (undated) DEVICE — TROCAR SITE CLOSURE DEVICE: Brand: ENDO CLOSE

## (undated) DEVICE — BLOCK BITE ENDOSCP AD 21 MM W/ DIL BLU LF DISP

## (undated) DEVICE — WIREGUIDED RETRIEVAL BASKET: Brand: TRAPEZOID RX

## (undated) DEVICE — FILTER SMK EVAC FLO CLR MEGADYNE

## (undated) DEVICE — SYR 10ML LUER LOK 1/5ML GRAD --

## (undated) DEVICE — NON-REM POLYHESIVE PATIENT RETURN ELECTRODE: Brand: VALLEYLAB

## (undated) DEVICE — SUTURE ETHLN SZ 2-0 L18IN NONABSORBABLE BLK L26MM FS 3/8 664G